# Patient Record
Sex: FEMALE | Race: WHITE | Employment: FULL TIME | URBAN - METROPOLITAN AREA
[De-identification: names, ages, dates, MRNs, and addresses within clinical notes are randomized per-mention and may not be internally consistent; named-entity substitution may affect disease eponyms.]

---

## 2017-01-07 ENCOUNTER — OFFICE VISIT (OUTPATIENT)
Dept: URGENT CARE | Facility: URGENT CARE | Age: 32
End: 2017-01-07
Payer: COMMERCIAL

## 2017-01-07 VITALS
HEART RATE: 85 BPM | BODY MASS INDEX: 34.37 KG/M2 | WEIGHT: 176 LBS | OXYGEN SATURATION: 97 % | DIASTOLIC BLOOD PRESSURE: 85 MMHG | SYSTOLIC BLOOD PRESSURE: 116 MMHG | TEMPERATURE: 96.8 F

## 2017-01-07 DIAGNOSIS — J45.901 REACTIVE AIRWAY DISEASE, UNSPECIFIED ASTHMA SEVERITY, WITH ACUTE EXACERBATION: ICD-10-CM

## 2017-01-07 DIAGNOSIS — H65.01 RIGHT ACUTE SEROUS OTITIS MEDIA, RECURRENCE NOT SPECIFIED: ICD-10-CM

## 2017-01-07 DIAGNOSIS — J20.9 ACUTE BRONCHITIS WITH COEXISTING CONDITION REQUIRING PROPHYLACTIC TREATMENT: Primary | ICD-10-CM

## 2017-01-07 PROCEDURE — 99214 OFFICE O/P EST MOD 30 MIN: CPT | Performed by: FAMILY MEDICINE

## 2017-01-07 RX ORDER — PREDNISONE 20 MG/1
20 TABLET ORAL DAILY
Qty: 5 TABLET | Refills: 0 | Status: SHIPPED | OUTPATIENT
Start: 2017-01-07 | End: 2017-02-10

## 2017-01-07 RX ORDER — AZITHROMYCIN 250 MG/1
TABLET, FILM COATED ORAL
Qty: 6 TABLET | Refills: 0 | Status: SHIPPED | OUTPATIENT
Start: 2017-01-07 | End: 2017-02-10

## 2017-01-07 NOTE — PROGRESS NOTES
SUBJECTIVE:                                                    Heron Gee is a 31 year old female who presents to clinic today for the following health issues:      RESPIRATORY SYMPTOMS      Duration: 5 days     Description  Cough, congestion, drainage in throat     Severity: moderate    Accompanying signs and symptoms: None    History (predisposing factors):  tobacco abuse    Precipitating or alleviating factors: worse at night    Therapies tried and outcome:  Dayquil, nyquil with no relief        Just started of with some nasal congestion  But the cough progressively worsening.   Known Asthma: per patient childhood asthma/reactive airway  Known COPD: No  SMOKER: Yes  Fever No  Sinus congestion/sinus pain yes  Chest pain or exertional shortness of breath: NO  Exposure to pertussis or pertussis like symptoms: NO  Orthopnea, worsening edema, pnd: NO  Rash: NO  Tried OTC medications without relief  Worsening symptoms hence patient came in to be seen     Problem list and histories reviewed & adjusted, as indicated.  Additional history: as documented    Problem list, Medication list, Allergies, and Medical/Social/Surgical histories reviewed in EPIC and updated as appropriate.    ROS:  Constitutional, HEENT, cardiovascular, pulmonary, gi and gu systems are negative, except as otherwise noted.    OBJECTIVE:                                                    /85 mmHg  Pulse 85  Temp(Src) 96.8  F (36  C) (Oral)  Wt 176 lb (79.833 kg)  SpO2 97%  Body mass index is 34.37 kg/(m^2).  GENERAL: healthy, alert and no distress  EYES: Pink palpebral conjunctiva, anicteric sclera  ENT: midline nasal septum normal ear exam of left. Right tympaninc membrane erythematous with dullness. Normal sinuses  NECK: no adenopathy, no asymmetry, masses, or scars and thyroid normal to palpation  RESP: symmetrical chest expansion, no retractions, increased expiratory phase wheezes heard at bilateral lungs both lung base.   CV:  regular rate and rhythm, normal S1 S2, no S3 or S4, no murmur, click or rub, no peripheral edema and peripheral pulses strong  SKIN: no readily visible rashes noted  MS: no gross musculoskeletal defects noted    Diagnostic Test Results:  none      ASSESSMENT/PLAN:                                                        ICD-10-CM    1. Acute bronchitis with coexisting condition requiring prophylactic treatment J20.9 azithromycin (ZITHROMAX) 250 MG tablet   2. Right acute serous otitis media, recurrence not specified H65.01 azithromycin (ZITHROMAX) 250 MG tablet   3. Reactive airway disease, unspecified asthma severity, with acute exacerbation J45.901 predniSONE (DELTASONE) 20 MG tablet       Patient given a prescription for zithromax. Side effects of antibiotic discussed. Aware of small but statistically significant increase cardiovascular risk with antibiotic.  Some wheezing noted. Cough worse at night prescribed with prednisone as well  Follow up with primary care provider recommended in 3 days  Advised that prolonged cough > 3 weeks recommend chest xray, offered today, declined.   Adverse reactions of medications discussed.  Over the counter medications discussed.   Aware to come back in if with worsening symptoms or if no relief despite treatment plan  Patient voiced understanding and had no further questions.     MD Winnie Frank MD  [facility]

## 2017-01-07 NOTE — MR AVS SNAPSHOT
After Visit Summary   1/7/2017    Heron Gee    MRN: [id number]           Patient Information     Date Of Birth          1985        Visit Information        Provider Department      1/7/2017 9:05 Winnie Shaffer MD [facility]        Today's Diagnoses     Acute bronchitis with coexisting condition requiring prophylactic treatment    -  1     Right acute serous otitis media, recurrence not specified         Reactive airway disease, unspecified asthma severity, with acute exacerbation            Follow-ups after your visit        Who to contact     If you have questions or need follow up information about today's clinic visit or your schedule please contact [facility] directly at [phone].  Normal or non-critical lab and imaging results will be communicated to you by MyChart, letter or phone within 4 business days after the clinic has received the results. If you do not hear from us within 7 days, please contact the clinic through [employer] or phone. If you have a critical or abnormal lab result, we will notify you by phone as soon as possible.  Submit refill requests through [employer] or call your pharmacy and they will forward the refill request to us. Please allow 3 business days for your refill to be completed.          Additional Information About Your Visit        MyChart Information     [employer] gives you secure access to your electronic health record. If you see a primary care provider, you can also send messages to your care team and make appointments. If you have questions, please call your primary care clinic.  If you do not have a primary care provider, please call [phone] and they will assist you.        Care EveryWhere ID     This is your Care EveryWhere ID. This could be used by other organizations to access your [facility] medical records  [id number]        Your Vitals Were     Pulse Temperature Pulse Oximetry             85 96.8  F (36   C) (Oral) 97%          Blood Pressure from Last 3 Encounters:   01/07/17 116/85   04/11/16 125/83   02/19/16 127/84    Weight from Last 3 Encounters:   01/07/17 176 lb (79.833 kg)   04/11/16 183 lb (83.008 kg)   02/19/16 179 lb 9.6 oz (81.466 kg)              Today, you had the following     No orders found for display         Today's Medication Changes          These changes are accurate as of: 1/7/17  9:28 AM.  If you have any questions, ask your nurse or doctor.               Start taking these medicines.        Dose/Directions    predniSONE 20 MG tablet   Commonly known as:  DELTASONE   Used for:  Reactive airway disease, unspecified asthma severity, with acute exacerbation   Started by:  Winnie Trevino MD        Dose:  20 mg   Take 1 tablet (20 mg) by mouth daily   Quantity:  5 tablet   Refills:  0         These medicines have changed or have updated prescriptions.        Dose/Directions    azithromycin 250 MG tablet   Commonly known as:  ZITHROMAX   This may have changed:  additional instructions   Used for:  Right acute serous otitis media, recurrence not specified, Acute bronchitis with coexisting condition requiring prophylactic treatment   Changed by:  Winnie Trevino MD        2 tablets the first day, then 1 tablet daily for the next 4 days   Quantity:  6 tablet   Refills:  0         Stop taking these medicines if you haven't already. Please contact your care team if you have questions.     albuterol 108 (90 BASE) MCG/ACT Inhaler   Commonly known as:  PROAIR HFA/PROVENTIL HFA/VENTOLIN HFA   Stopped by:  Winnie Trevino MD           guaiFENesin-codeine 100-10 MG/5ML Soln solution   Commonly known as:  ROBITUSSIN AC   Stopped by:  Winnie Trevino MD                Where to get your medicines      These medications were sent to [facility] [address] [address], [facility] [address]  [address] [address] [address]     Phone:  [phone]     - azithromycin 250 MG tablet  - predniSONE 20 MG tablet             Primary Care Provider Office Phone # Fax #    Mat Mawuena Agbeh, [phone]       [facility] [address] [facility] [facility] [address] [facility] [address]        Thank you!     [facility] you [facility]  [facility] your [facility]. Our goal is always to provide you with excellent care. Hearing back from our patients is one way we can continue to improve our services. Please take a few minutes to complete the written survey that you may receive in the mail after your visit with us. Thank you!             Your Updated Medication List - Protect others around you: Learn how to safely use, store and throw away your medicines at www.disposemymeds.org.          This list is accurate as of: 1/7/17  9:28 AM.  Always use your most recent med list.                   Brand Name Dispense Instructions for use    ALLEGRA-D 12 HOUR PO          azithromycin 250 MG tablet    ZITHROMAX    6 tablet    2 tablets the first day, then 1 tablet daily for the next 4 days       etonogestrel-ethinyl estradiol 0.12-0.015 MG/24HR vaginal ring    NUVARING    3 each    Place 1 ring every 21 days then remove for 1 week.       predniSONE 20 MG tablet    DELTASONE    5 tablet    Take 1 tablet (20 mg) by mouth daily

## 2017-01-07 NOTE — NURSING NOTE
Chief Complaint   Patient presents with     Sick       Initial /85 mmHg  Pulse 85  Temp(Src) 96.8  F (36  C) (Oral)  Wt 176 lb (79.833 kg)  SpO2 97% Estimated body mass index is 34.37 kg/(m^2) as calculated from the following:    Height as of 4/11/16: 5' (1.524 m).    Weight as of this encounter: 176 lb (79.833 kg).  BP completed using cuff size: NICOLE Brown

## 2017-01-16 DIAGNOSIS — Z30.09 ENCOUNTER FOR OTHER GENERAL COUNSELING OR ADVICE ON CONTRACEPTION: Primary | ICD-10-CM

## 2017-01-16 NOTE — TELEPHONE ENCOUNTER
NUVARING      Last Written Prescription Date: 10-22-16  Last Fill Quantity: 3, # refills: 3  Last Office Visit with G, P or [facility] prescribing provider: 2-19-16       BP Readings from Last 3 Encounters:   01/07/17 116/85   04/11/16 125/83   02/19/16 127/84     Date of last Breast Exam: ?

## 2017-01-19 RX ORDER — ETONOGESTREL AND ETHINYL ESTRADIOL VAGINAL RING .015; .12 MG/D; MG/D
RING VAGINAL
Qty: 3 EACH | Refills: 3 | Status: SHIPPED | OUTPATIENT
Start: 2017-01-19 | End: 2017-02-10

## 2017-01-19 NOTE — TELEPHONE ENCOUNTER
Return call from patient- patient scheduled for AFE on 2/10/17 at [facility] with Dr. Agbeh.  Patient requesting extension refill to get her through to this appointment.  No call back required.

## 2017-01-19 NOTE — TELEPHONE ENCOUNTER
Patient last seen by Dr. Agbeh on 1/28/16 for PPE. Patient due to be seen 1/28/17 or later.  Left voicemail for patient to call.

## 2017-02-10 ENCOUNTER — OFFICE VISIT (OUTPATIENT)
Dept: OBGYN | Facility: CLINIC | Age: 32
End: 2017-02-10
Payer: COMMERCIAL

## 2017-02-10 VITALS
DIASTOLIC BLOOD PRESSURE: 73 MMHG | RESPIRATION RATE: 16 BRPM | OXYGEN SATURATION: 98 % | HEART RATE: 62 BPM | BODY MASS INDEX: 34.75 KG/M2 | WEIGHT: 177 LBS | SYSTOLIC BLOOD PRESSURE: 111 MMHG | HEIGHT: 60 IN

## 2017-02-10 DIAGNOSIS — Z30.09 ENCOUNTER FOR OTHER GENERAL COUNSELING OR ADVICE ON CONTRACEPTION: ICD-10-CM

## 2017-02-10 DIAGNOSIS — Z01.419 ENCOUNTER FOR GYNECOLOGICAL EXAMINATION WITHOUT ABNORMAL FINDING: Primary | ICD-10-CM

## 2017-02-10 DIAGNOSIS — Z83.3 FAMILY HISTORY OF DIABETES MELLITUS: ICD-10-CM

## 2017-02-10 LAB
ALBUMIN SERPL-MCNC: 3.6 G/DL (ref 3.4–5)
ALP SERPL-CCNC: 74 U/L (ref 40–150)
ALT SERPL W P-5'-P-CCNC: 38 U/L (ref 0–50)
ANION GAP SERPL CALCULATED.3IONS-SCNC: 7 MMOL/L (ref 3–14)
AST SERPL W P-5'-P-CCNC: 20 U/L (ref 0–45)
BILIRUB SERPL-MCNC: 0.2 MG/DL (ref 0.2–1.3)
BUN SERPL-MCNC: 13 MG/DL (ref 7–30)
CALCIUM SERPL-MCNC: 9.1 MG/DL (ref 8.5–10.1)
CHLORIDE SERPL-SCNC: 107 MMOL/L (ref 94–109)
CO2 SERPL-SCNC: 27 MMOL/L (ref 20–32)
CREAT SERPL-MCNC: 0.84 MG/DL (ref 0.52–1.04)
ERYTHROCYTE [DISTWIDTH] IN BLOOD BY AUTOMATED COUNT: 13.3 % (ref 10–15)
GFR SERPL CREATININE-BSD FRML MDRD: 80 ML/MIN/1.7M2
GLUCOSE SERPL-MCNC: 87 MG/DL (ref 70–99)
HBA1C MFR BLD: 5.1 % (ref 4.3–6)
HCT VFR BLD AUTO: 41.9 % (ref 35–47)
HGB BLD-MCNC: 14 G/DL (ref 11.7–15.7)
MCH RBC QN AUTO: 28.9 PG (ref 26.5–33)
MCHC RBC AUTO-ENTMCNC: 33.4 G/DL (ref 31.5–36.5)
MCV RBC AUTO: 87 FL (ref 78–100)
PLATELET # BLD AUTO: 191 10E9/L (ref 150–450)
POTASSIUM SERPL-SCNC: 4.1 MMOL/L (ref 3.4–5.3)
PROT SERPL-MCNC: 7.5 G/DL (ref 6.8–8.8)
RBC # BLD AUTO: 4.84 10E12/L (ref 3.8–5.2)
SODIUM SERPL-SCNC: 141 MMOL/L (ref 133–144)
TSH SERPL DL<=0.005 MIU/L-ACNC: 1.87 MU/L (ref 0.4–4)
WBC # BLD AUTO: 10.5 10E9/L (ref 4–11)

## 2017-02-10 PROCEDURE — 84443 ASSAY THYROID STIM HORMONE: CPT | Performed by: OBSTETRICS & GYNECOLOGY

## 2017-02-10 PROCEDURE — 80053 COMPREHEN METABOLIC PANEL: CPT | Performed by: OBSTETRICS & GYNECOLOGY

## 2017-02-10 PROCEDURE — 36415 COLL VENOUS BLD VENIPUNCTURE: CPT | Performed by: OBSTETRICS & GYNECOLOGY

## 2017-02-10 PROCEDURE — 99395 PREV VISIT EST AGE 18-39: CPT | Performed by: OBSTETRICS & GYNECOLOGY

## 2017-02-10 PROCEDURE — 83036 HEMOGLOBIN GLYCOSYLATED A1C: CPT | Performed by: OBSTETRICS & GYNECOLOGY

## 2017-02-10 PROCEDURE — 85027 COMPLETE CBC AUTOMATED: CPT | Performed by: OBSTETRICS & GYNECOLOGY

## 2017-02-10 RX ORDER — ETONOGESTREL AND ETHINYL ESTRADIOL VAGINAL RING .015; .12 MG/D; MG/D
RING VAGINAL
Qty: 3 EACH | Refills: 3 | Status: SHIPPED | OUTPATIENT
Start: 2017-02-10 | End: 2018-07-09

## 2017-02-10 NOTE — PATIENT INSTRUCTIONS
If you have any questions regarding your visit, Please contact your care team.      Women s Health CLINIC HOURS TELEPHONE NUMBER   Cephas Agbeh, M.D. Lisa -      Viri     Monday-[place]  8:00a.m-4:45 p.m  Tuesday--[place]   8:00a.m-4:45 p.m.  Thursday-[place]  8:00a.m-4:45 p.m.  Friday-[facility]  [address] [place] [address]  [phone]    [id number]-[phone]    [facility]  [address] [facility] [address]  [phone]  [phone]   Urgent Care locations:    [facility] Monday-Friday  5 pm - 9 pm  Saturday and Sunday   9 am - 5 pm    Monday-Friday   11 pm - 9 pm  Saturday and Sunday   9 am - 5 pm   [phone]     If you need a medication refill, please contact your pharmacy. Please allow 3 business days for your refill to be completed.  As always, Thank you for trusting us with your healthcare needs!

## 2017-02-10 NOTE — NURSING NOTE
Chief Complaint   Patient presents with     Physical     AFE       Initial /73 mmHg  Pulse 62  Resp 16  Ht 5' (1.524 m)  Wt 177 lb (80.287 kg)  BMI 34.57 kg/m2  SpO2 98%  LMP 01/26/2017  Breastfeeding? No Estimated body mass index is 34.57 kg/(m^2) as calculated from the following:    Height as of this encounter: 5' (1.524 m).    Weight as of this encounter: 177 lb (80.287 kg).  Medication Reconciliation: complete   Salinai Vernon, CMA

## 2017-02-10 NOTE — PROGRESS NOTES
Heron is a 31 year old  here for annual exam. Complaints of fatigue and decrease libido. Baby is 14 months.  Menses are regular with the ring.    ROS: Ten point review of systems was reviewed and negative except the above.    Health Maintenance   Topic Date Due     FOOT EXAM Q1 YEAR( NO INBASKET)  1988     EYE EXAM Q1 YEAR( NO INBASKET)  1988     CREATININE Q1 YEAR (NO INBASKET)  1988     TSH W/ FREE T4 REFLEX Q2 YEAR (NO INBASKET)  1988     LIPID MONITORING Q1 YEAR( NO INBASKET)  1988     MICROALBUMIN Q1 YEAR( NO INBASKET)  1988     PNEUMOVAX 1X HI RISK PATIENT < 65 (NO IB MSG)  1989     A1C Q6 MO( NO INBASKET)  10/17/2015     INFLUENZA VACCINE (SYSTEM ASSIGNED)  2017     PAP Q3 YR  2019     TETANUS IMMUNIZATION (SYSTEM ASSIGNED)  10/02/2025      Last pap:   Last Mammogram: none  Last Dexa: none  Last Colonoscopy:   No results found for this basename: chol  No results found for this basename: hdl  No results found for this basename: ldl  No results found for this basename: trig  No results found for this basename: cholhdlratio      OBHX:      Past Surgical History   Procedure Laterality Date     Appendectomy       as a child     Deviated septum       teenager       PMH: Her past medical, surgical, and obstetric histories were reviewed and are documented in their appropriate chart areas.    ALL/Meds: Her medication and allergy histories were reviewed and are documented in their appropriate chart areas.    SH/FMH: Her social and family history was reviewed and documented in its appropriate chart area.    PE: /73 mmHg  Pulse 62  Resp 16  Ht 5' (1.524 m)  Wt 177 lb (80.287 kg)  BMI 34.57 kg/m2  SpO2 98%  LMP 2017  Breastfeeding? No  Body mass index is 34.57 kg/(m^2).    General Appearance:  healthy, alert, active, no distress  Cardiovascular:  Regular rate and Rhythm  Neck: Supple, no adenopathy and thyroid normal  Lungs:  Clear, without  wheeze, rale or rhonchi  Breast: fibrocystic changes Left breast 12 oclock., recheck in one month.  Abdomen: Benign, Soft, flat, non-tender, No masses, organomegaly, No inguinal nodes and Bowel sounds normoactiveSoft, nontender.   Pelvic:       - Ext: Vulva and perineum are normal without lesion, mass or discharge        - Urethra: normal without discharge or scarring  hypermobility       - Urethral Meatus: normal appearance,        - Bladder: no tenderness, no masses       - Vagina: Normal mucosa, no discharge     rugated       - Cervix: normal       - Uterus:Normal shape, position and consistencyfirm, nontender, nongravid uterus without CMT       - Adnexa: Normal without masses or tenderness       - Rectal: deferred    A/P:  [facility] Woman,     ICD-10-CM    1. Encounter for gynecological examination without abnormal finding Z01.419 CBC with platelets     Comprehensive metabolic panel     TSH with free T4 reflex     Hemoglobin A1c   2. Encounter for other general counseling or advice on contraception Z30.09 etonogestrel-ethinyl estradiol (NUVARING) 0.12-0.015 MG/24HR vaginal ring     CBC with platelets     Comprehensive metabolic panel     TSH with free T4 reflex     Hemoglobin A1c   3. Routine postpartum follow-up Z39.2 etonogestrel-ethinyl estradiol (NUVARING) 0.12-0.015 MG/24HR vaginal ring   4. Family history of diabetes mellitus Z83.3         -  BC: RING.      - Encouraged self-breast exam. Recheck breast exam in one month.   - Encouraged low fat diet, regular exercise, and adequate calcium intake.    CEPHAS AGBEH, MD.

## 2017-02-11 ASSESSMENT — PATIENT HEALTH QUESTIONNAIRE - PHQ9: SUM OF ALL RESPONSES TO PHQ QUESTIONS 1-9: 3

## 2017-03-17 ENCOUNTER — OFFICE VISIT (OUTPATIENT)
Dept: OBGYN | Facility: CLINIC | Age: 32
End: 2017-03-17
Payer: COMMERCIAL

## 2017-03-17 VITALS
TEMPERATURE: 97.1 F | BODY MASS INDEX: 33.98 KG/M2 | HEART RATE: 120 BPM | DIASTOLIC BLOOD PRESSURE: 73 MMHG | WEIGHT: 174 LBS | SYSTOLIC BLOOD PRESSURE: 112 MMHG | OXYGEN SATURATION: 95 %

## 2017-03-17 DIAGNOSIS — N60.11 FIBROCYSTIC BREAST CHANGES OF BOTH BREASTS: Primary | ICD-10-CM

## 2017-03-17 DIAGNOSIS — N60.12 FIBROCYSTIC BREAST CHANGES OF BOTH BREASTS: Primary | ICD-10-CM

## 2017-03-17 PROCEDURE — 99214 OFFICE O/P EST MOD 30 MIN: CPT | Performed by: OBSTETRICS & GYNECOLOGY

## 2017-03-17 NOTE — NURSING NOTE
Chief Complaint   Patient presents with     Gyn Exam     recheck left breast       Initial /73  Pulse 120  Temp 97.1  F (36.2  C) (Tympanic)  Wt 174 lb (78.9 kg)  LMP 02/22/2017 (Approximate)  SpO2 95%  BMI 33.98 kg/m2 Estimated body mass index is 33.98 kg/(m^2) as calculated from the following:    Height as of 2/10/17: 5' (1.524 m).    Weight as of this encounter: 174 lb (78.9 kg).  Medication Reconciliation: complete     Makayla Naik LPN

## 2017-03-17 NOTE — MR AVS SNAPSHOT
After Visit Summary   3/17/2017    Heron Gee    MRN: [id number]           Patient Information     Date Of Birth          1985        Visit Information        Provider Department      3/17/2017 8:30 AM Agbeh, Cephas Mawuena, MD [facility]        Today's Diagnoses     Fibrocystic breast changes of both breasts    -  1       Follow-ups after your visit        Who to contact     If you have questions or need follow up information about today's clinic visit or your schedule please contact [facility] directly at [phone].  Normal or non-critical lab and imaging results will be communicated to you by [employer], letter or phone within 4 business days after the clinic has received the results. If you do not hear from us within 7 days, please contact the clinic through [employer] or phone. If you have a critical or abnormal lab result, we will notify you by phone as soon as possible.  Submit refill requests through [employer] or call your pharmacy and they will forward the refill request to us. Please allow 3 business days for your refill to be completed.          Additional Information About Your Visit        MyChart Information     [employer] gives you secure access to your electronic health record. If you see a primary care provider, you can also send messages to your care team and make appointments. If you have questions, please call your primary care clinic.  If you do not have a primary care provider, please call [phone] and they will assist you.        Care EveryWhere ID     This is your Care EveryWhere ID. This could be used by other organizations to access your [facility] medical records  [id number]        Your Vitals Were     Pulse Temperature Last Period Pulse Oximetry BMI (Body Mass Index)       120 97.1  F (36.2  C) (Tympanic) 02/22/2017 (Approximate) 95% 33.98 kg/m2        Blood Pressure from Last 3 Encounters:   03/17/17 112/73   02/10/17 111/73   01/07/17 116/85     Weight from Last 3 Encounters:   03/17/17 174 lb (78.9 kg)   02/10/17 177 lb (80.3 kg)   01/07/17 176 lb (79.8 kg)              Today, you had the following     No orders found for display       Primary Care Provider Office Phone # Fax #    Mat Mawuena Agbeh, [phone]       [facility] [address] [facility] [address]        Thank you!     Thank you for choosing [facility]  for your care. Our goal is always to provide you with excellent care. Hearing back from our patients is one way we can continue to improve our services. Please take a few minutes to complete the written survey that you may receive in the mail after your visit with us. Thank you!             Your Updated Medication List - Protect others around you: Learn how to safely use, store and throw away your medicines at www.disposemymeds.org.          This list is accurate as of: 3/17/17  9:14 AM.  Always use your most recent med list.                   Brand Name Dispense Instructions for use    ALLEGRA-D 12 HOUR PO          etonogestrel-ethinyl estradiol 0.12-0.015 MG/24HR vaginal ring    NUVARING    3 each    Place 1 ring every 21 days then remove for 1 week.

## 2017-03-17 NOTE — PROGRESS NOTES
Heron Gee is a 31 year old year old female, G 1 P1, who presents today for her one month recheck of breast changes.  She reports denies any pain, nipple discharge or discolation       Past Medical History   Diagnosis Date     ASCUS on Pap smear 4/17/15     Plan Pap/HPV at 6 wk PP exam       Past Surgical History   Procedure Laterality Date     Appendectomy       as a child     Deviated septum       teenager       Obstetric History       T0      TAB0   SAB0   E0   M0   L1       # Outcome Date GA Lbr Master/2nd Weight Sex Delivery Anes PTL Lv   1 Para 12/06/15 40w0d  7 lb 12 oz (3.515 kg) F CS-LTranv  N Y      Name: Alexander          No Known Allergies    Current Outpatient Prescriptions   Medication Sig Dispense Refill     etonogestrel-ethinyl estradiol (NUVARING) 0.12-0.015 MG/24HR vaginal ring Place 1 ring every 21 days then remove for 1 week. 3 each 3     Fexofenadine-Pseudoephedrine (ALLEGRA-D 12 HOUR PO)          Social History     Social History     Marital status: Single     Spouse name: N/A     Number of children: N/A     Years of education: N/A     Occupational History     Not on file.     Social History Main Topics     Smoking status: Current Every Day Smoker     Packs/day: 0.50     Smokeless tobacco: Never Used     Alcohol use 0.0 oz/week     0 Standard drinks or equivalent per week     Drug use: No     Sexual activity: Yes     Partners: Male     Other Topics Concern     Not on file     Social History Narrative       Family History   Problem Relation Age of Onset     DIABETES Brother      Hypertension No family hx of      Breast Cancer No family hx of      Ovarian Cancer No family hx of      CEREBROVASCULAR DISEASE No family hx of      Thyroid Disease No family hx of           Review of Symptoms:    10 point ROS of systems including Constitutional, Eyes, Respiratory, Cardiovascular, Gastroenterology, Genitourinary, Integumentary, Muscularskeletal, Psychiatric were all negative except for  pertinent positives noted in my HPI and in the PMH.           EXAM:  /73  Pulse 120  Temp 97.1  F (36.2  C) (Tympanic)  Wt 174 lb (78.9 kg)  LMP 02/22/2017 (Approximate)  SpO2 95%  BMI 33.98 kg/m2   General Appearance: Pleasant, alert, appropriate appearance for age. No acute distress  Head Exam: Normal. Normocephalic, atraumatic.  Neck Exam: neck supple with no rigidity  Thyroid Exam: No nodules or enlargement., normal to palpation  Chest/Respiratory Exam: Normal chest wall and respirations. Clear to auscultation.  Breast Exam: No dimpling, nipple retraction or discharge. No masses or nodes, normal breast tissue bilaterally. Fibrocystic changes.  Cardiovascular Exam: Regular rate and rhythm. S1, S2, no murmur  Gastrointestinal Exam: Soft, nontender, no abnormal masses or organomegaly.  Musculoskeletal Exam: Back is straight and non-tender, full ROM of upper and lower extremities.  Skin: no rash or abnormalities  Neurologic Exam: Normal gross motor movement, tone, and coordination. No tremor.  Psychiatric Exam: Alert and oriented, appropriate affect.      ASSESSMENT/PLAN:      ICD-10-CM    1. Fibrocystic breast changes of both breasts N60.11     N60.12      No further follow up except annual exams.  Discussed breast care and self exams.  30 minutes was spent face to face with the patient today discussing her history, diagnosis, and follow-up plan as noted above.  Over 50% of the visit was spent in counseling and coordination of care.    Total Visit Time: 25 minutes.    CEPHAS AGBEH, MD.

## 2018-02-20 NOTE — TELEPHONE ENCOUNTER
"Pt has no dx for maintenance inhaler  Call to home number \"mail box is full  Wondering if pt is having symptoms ,will try again later    "

## 2018-02-20 NOTE — TELEPHONE ENCOUNTER
"Requested Prescriptions   Pending Prescriptions Disp Refills     VENTOLIN  (90 BASE) MCG/ACT Inhaler [Pharmacy Med Name: VENTOLIN HFA 90 MCG INHALER] 18 Inhaler 1    Last Written Prescription Date:  2-20-18  Last Fill Quantity: 18,  # refills: 1   Last office visit: 4/11/2016 with prescribing provider:  4-11-16   Future Office Visit:   Sig: INHALE TWO PUFFS BY MOUTH EVERY 6 HOURS AS NEEDED SHORTNESS OF BREATH/DYSPNEA OR WHEEZING    Asthma Maintenance Inhalers - Anticholinergics Passed    2/20/2018 11:13 AM       Passed - Patient is age 12 years or older       Passed - Recent or future visit with authorizing provider's specialty    Patient had office visit in the last year or has a visit in the next 30 days with authorizing provider.  See \"Patient Info\" tab in inbasket, or \"Choose Columns\" in Meds & Orders section of the refill encounter.             "

## 2018-02-23 RX ORDER — ALBUTEROL SULFATE 90 UG/1
AEROSOL, METERED RESPIRATORY (INHALATION)
Qty: 18 INHALER | Refills: 1 | OUTPATIENT
Start: 2018-02-23

## 2018-02-23 NOTE — TELEPHONE ENCOUNTER
Attempted to reach patient.  Unable to leave message due to mail box is full.  Med prescribed almost 2 years ago for an acute illness.  Called pharmacy and informed them to have patient call clinic.

## 2018-03-12 DIAGNOSIS — Z30.09 ENCOUNTER FOR OTHER GENERAL COUNSELING OR ADVICE ON CONTRACEPTION: ICD-10-CM

## 2018-03-12 NOTE — TELEPHONE ENCOUNTER
Requested Prescriptions   Pending Prescriptions Disp Refills     etonogestrel-ethinyl estradiol (NUVARING) 0.12-0.015 MG/24HR vaginal ring  Last Written Prescription Date:  12/19/17  Last Fill Quantity: 3,  # refills: 3   Last office visit: 3/17/2017 with prescribing provider:  C. Agbeh   Future Office Visit:     3 each 3     Sig: Place 1 ring every 21 days then remove for 1 week.    There is no refill protocol information for this order

## 2018-03-19 NOTE — TELEPHONE ENCOUNTER
Tried to attempt patient but unable to leave message. Also, spoke with pharmacy and let them know patient needs to be seen before more refills. They will make a note.    Heidi Daniels RN

## 2018-03-22 RX ORDER — ETONOGESTREL AND ETHINYL ESTRADIOL VAGINAL RING .015; .12 MG/D; MG/D
RING VAGINAL
Qty: 3 EACH | Refills: 3 | OUTPATIENT
Start: 2018-03-22

## 2018-03-22 NOTE — TELEPHONE ENCOUNTER
Tried to attempt patient but unable to leave a message. Pharmacy was aware if patient calls needs to be seen.  Heidi Daniels RN

## 2018-04-18 ENCOUNTER — VIRTUAL VISIT (OUTPATIENT)
Dept: FAMILY MEDICINE | Facility: OTHER | Age: 33
End: 2018-04-18

## 2018-04-18 NOTE — PROGRESS NOTES
"Date:   Clinician: Heidi Santiago  Clinician NPI: [id number]  Patient: Heron Gee  Patient : 1985  Patient Address: [address] [place], [place] [address]  Patient Phone: [phone]  Visit Protocol: URI  Patient Summary:  Heron is a 33 year old ( : 1985 ) female who initiated a Visit for cold, sinus infection, or influenza. When asked the question \"Please sign me up to receive news, health information and promotions. \", Heron responded \"No\".    Heron states her symptoms started gradually 7-9 days ago. After her symptoms started, they improved and then got worse again.   Her symptoms consist of facial pain or pressure, rhinitis, enlarged lymph nodes, nasal congestion, malaise, and myalgia.   Symptom details     Nasal secretions: The color of her mucus is blood-tinged and yellow.    Facial pain or pressure: The facial pain or pressure does not feel worse when bending or leaning forward.      Heron denies having cough, fever, dyspnea, ear pain, sore throat, headache, chills, wheezing, and teeth pain. She also denies having recent facial or sinus surgery in the past 60 days, taking antibiotic medication for the symptoms, and having a sinus infection within the past year.   She has not recently been exposed to someone with influenza. Heron has been in close contact with the following high risk individuals: children under the age of 5.   Weight: 180 lbs   Heron does not smoke or use smokeless tobacco.   She denies pregnancy and denies breastfeeding. She has menstruated in the past month.  MEDICATIONS:  No current medications  , ALLERGIES:   NKDA    Clinician Response:  Dear Heron,  Based on the information provided, you have acute bacterial sinusitis, also known as a sinus infection. Sinus infections are caused by bacteria or a virus and symptoms are almost always identical. The difference between the 2 types of infections is timing.  Sinus infections start as viral " infections and symptoms improve on their own in about 7 days. If symptoms have not improved after 7 days or have even worsened, a bacterial infection may have developed.  Medication information  I am prescribing:       Fluticasone 50 mcg/actuation nasal spray. Inhale 1-2 sprays in each nostril 1 time per day. This medication takes several days to start working, so keep taking it even if it doesn't help right away. There are no refills with this prescription.      Amoxicillin-pot clavulanate 875-125 mg oral tablet. Take 1 tablet by mouth every 12 hours for 10 days. There are no refills with this prescription.     Antibiotics can cause an allergic reaction even if you have taken them without a problem in the past. If you develop a new rash, swelling, or difficulty breathing, stop the medication and be seen in a clinic or urgent care immediately.  A yeast infection is a side effect of taking antibiotics in some women. Please use [facility] to get treatment if you have symptoms of a yeast infection.  If you become pregnant during this course of treatment, stop taking the medication and contact your primary care provider.  Unless you are allergic to the over-the-counter medication(s) below, I recommend using:       Acetaminophen (Tylenol or store brand) oral tablet. Take 1-2 tablets by mouth every 4-6 hours to help with the discomfort.      Ibuprofen (Advil or store brand) 200 mg oral tablet. Take 1-3 tablets (200-600 mg) by mouth every 8 hours to help with the discomfort. Make sure to take the ibuprofen with food. Do not exceed 2400 mg in 24 hours.    A decongestant such as Sudafed PE or store brand.      Guaifenesin + dextromethorphan (Robitussin DM, Mucinex DM, or store brand).    A sinus irrigation kit such as Sinus Rinse, Neti Pot, SinuCleanse, or store brand. Be sure to use sterile or previously boiled water to prevent unwanted infections.     Over-the-counter medications do not require a prescription. Ask the  pharmacist if you have any questions.  Self care  The following tips will keep you as comfortable as possible while you recover:     Rest    Drink plenty of water and other liquids    Take a hot shower to loosen congestion     When to seek care  Please be seen in a clinic or urgent care if any of the following occur:     Symptoms do not start to improve after 3 days of treatment    New symptoms develop, or symptoms become worse      Diagnosis: Acute bacterial sinusitis  Diagnosis ICD: J01.90  Prescription: fluticasone 50 mcg/actuation nasal spray,suspension 1 120 spray aerosol with adapter (grams), 30 days supply. Inhale 1-2 sprays in each nostril 1 time per day. Refills: 0, Refill as needed: no, Allow substitutions: yes  Prescription: amoxicillin-pot clavulanate 875-125 mg oral tablet 20 tablet, 10 days supply. Take 1 tablet by mouth every 12 hours for 10 days. Refills: 0, Refill as needed: no, Allow substitutions: yes  Pharmacy: [id number] - [phone] - [address] [place] [address]

## 2018-07-09 ENCOUNTER — OFFICE VISIT (OUTPATIENT)
Dept: FAMILY MEDICINE | Facility: CLINIC | Age: 33
End: 2018-07-09
Payer: COMMERCIAL

## 2018-07-09 VITALS
HEART RATE: 122 BPM | OXYGEN SATURATION: 97 % | SYSTOLIC BLOOD PRESSURE: 113 MMHG | BODY MASS INDEX: 38.3 KG/M2 | WEIGHT: 190 LBS | DIASTOLIC BLOOD PRESSURE: 75 MMHG | TEMPERATURE: 99.1 F | RESPIRATION RATE: 16 BRPM | HEIGHT: 59 IN

## 2018-07-09 DIAGNOSIS — Z32.01 POSITIVE PREGNANCY TEST: Primary | ICD-10-CM

## 2018-07-09 DIAGNOSIS — O99.331 MATERNAL TOBACCO USE IN FIRST TRIMESTER: ICD-10-CM

## 2018-07-09 LAB — BETA HCG QUAL IFA URINE: POSITIVE

## 2018-07-09 PROCEDURE — 99213 OFFICE O/P EST LOW 20 MIN: CPT | Performed by: NURSE PRACTITIONER

## 2018-07-09 PROCEDURE — 84703 CHORIONIC GONADOTROPIN ASSAY: CPT | Performed by: NURSE PRACTITIONER

## 2018-07-09 NOTE — NURSING NOTE
"Chief Complaint   Patient presents with     Pregnancy Test       Initial /75  Pulse 122  Temp 99.1  F (37.3  C) (Oral)  Resp 16  Ht 4' 11.45\" (1.51 m)  Wt 190 lb (86.2 kg)  LMP 05/12/2018 (Approximate)  SpO2 97%  Breastfeeding? No  BMI 37.8 kg/m2 Estimated body mass index is 37.8 kg/(m^2) as calculated from the following:    Height as of this encounter: 4' 11.45\" (1.51 m).    Weight as of this encounter: 190 lb (86.2 kg).  Medication Reconciliation: complete     Pati Bloom MA  "

## 2018-07-09 NOTE — MR AVS SNAPSHOT
After Visit Summary   7/9/2018    Heron Gee    MRN: [id number]           Patient Information     Date Of Birth          1985        Visit Information        Provider Department      7/9/2018 3:20 PM Dana Borja NP [facility]        Today's Diagnoses     Positive pregnancy test    -  1    Maternal tobacco use in first trimester          Care Instructions    Take daily prenatal vitamin with food. Try to cut back on your smoking.  No alcohol is safe in pregnancy.  [facility] has the drug site to see that is safe during pregnancy-https://www.cdc.gov/pregnancy/meds/treatingfortwo/index.html  https://www.aafp.org/afp/2003/0615/p2517.html    Saturday, february 16, 2019   Only 31 weeks and 5 days left until your baby is born!   Conception likely took place on may 26, 2018.   You are 8 weeks and 2 days pregnant. (Fetal age: 6 weeks and 2 days)   The baby will be born during winter time.   You are in the first trimester.   The first trimester began on may 12, 2018.   The second trimester (weeks 13-27) begins on august 11, 2018.   The third trimester (weeks 28-40) begins on november 24, 2018.   Your safe range to give birth: january 26, 2019 to march 02, 2019 (full term).  February 2019   Sun Mon Tue Wed Thu Fri Sat        1 2   3 4 5 6 7 8 9   10 11 12 13 14 15 16   17 18 19 20 21 22 23   24 25 26 27 28     Add event to your [place] Calendar  58 days have passed since the start of your pregnancy. There are 222 days left until your due date.  21% of your pregnancy is completed.    Pregnancy    Your exam today shows that you are pregnant.  Pregnancy symptoms  During pregnancy your body s hormones change. This causes physical and emotional changes. This is normal. Knowing what to expect is important for your piece of mind and so you know when to seek help for a problem. Here are some of the most common symptoms:    Morning sickness or nausea. This can happen any time of the day or  night.    Tender, swollen breasts    Need to urinate frequently    Tiredness or fatigue    Dizziness    Indigestion or heartburn    Food cravings or turn-offs    Constipation    Emotional changes. This can range from anxiety to excitement to depression.  General care for a healthy pregnancy  Here are things you can do to help make sure your baby is born healthy:    Rest when you feel tired. This is especially true in the later months of pregnancy.    Drink more fluids. Your body needs more fluids than you may be used to. Drink 8 to10 glasses of juice, milk, or water every day.    Eat well-balanced meals. Eat at regular times to give your body enough protein. You can expect to gain about 30 pounds during the pregnancy. Don t try to diet or lose weight while you are pregnant.    Take a prenatal vitamin every day. This helps you meet the extra nutritional needs of pregnancy.    Don t take any other medicine during your pregnancy unless your healthcare provider tells you to. This includes prescription medicines and those you buy over the counter. Many medicines can harm the growing baby.    If you have nausea or vomiting, don t eat greasy or fried foods. Eat several smaller meals throughout the day rather than 3 large meals.    If you smoke, you must stop. The nicotine you breathe in goes right to the baby.    Stay away from alcohol, even in moderate amounts. Daily drinking will harm your baby and can cause permanent brain damage.    Don t use recreational drugs, especially cocaine, crack, and heroin. These will harm your baby. Also avoid marijuana.    If you were using recreational drugs or prescribed medicine when you found out that you were pregnant, talk with your healthcare provider about possible effects on your growing baby.    If you have medical problems that you need to take medicine for, talk with your healthcare provider.  Follow-up care  Call your healthcare provider to arrange for prenatal care. Prenatal  care is important. You can see your family provider, a pregnancy specialist (obstetrician), or a primary care clinic.  When to seek medical advice  Call your healthcare provider right away if any of these occur:    Vaginal bleeding    Pain in your belly (abdomen) or back that is moderate or severe    Lots of vomiting, or you can t keep any fluids down for 6 hours    Burning feeling when you urinate    Headache, dizziness, or rapid weight gain    Fever    Vision changes or blurred vision  Date Last Reviewed: 10/1/2016    3694-3135 [employer]. [address] [place] [address]. All rights reserved. This information is not intended as a substitute for professional medical care. Always follow your healthcare professional's instructions.                Follow-ups after your visit        Additional Services     OB/GYN REFERRAL       Your provider has referred you to:  FMG: [facility] [phone]   [email or web address]  [facility]: [facility] [phone]   [email or web address]  FM[facility]: [facility] [phone]   [email or web address]  FMG: [facility] [phone]   [email or web address]    Please be aware that coverage of these services is subject to the terms and limitations of your health insurance plan.  Call member services at your health plan with any benefit or coverage questions.      Please bring the following with you to your appointment:    (1) Any X-Rays, CTs or MRIs which have been performed.  Contact the facility where they were done to arrange for  prior to your scheduled appointment.   (2) List of current medications   (3) This referral request   (4) Any documents/labs given to you for this referral                  Follow-up notes from your care team     Return if symptoms worsen or fail to improve.      Your next  "10 appointments already scheduled     Jul 09, 2018  3:20 PM CDT   SHORT with Dana Borja NP   [facility]    [address] [facility]  [place] [address] [phone]              Who to contact     Normal or non-critical lab and imaging results will be communicated to you by Clickpasshart, letter or phone within 4 business days after the clinic has received the results. If you do not hear from us within 7 days, please contact the clinic through Clickpasshart or phone. If you have a critical or abnormal lab result, we will notify you by phone as soon as possible.  Submit refill requests through Dada or call your pharmacy and they will forward the refill request to us. Please allow 3 business days for your refill to be completed.          If you need to speak with a  for additional information , please call: [phone]             Additional Information About Your Visit        Dada Information     Dada gives you secure access to your electronic health record. If you see a primary care provider, you can also send messages to your care team and make appointments. If you have questions, please call your primary care clinic.  If you do not have a primary care provider, please call [phone] and they will assist you.        Care EveryWhere ID     This is your Care EveryWhere ID. This could be used by other organizations to access your [place] medical records  [id number]        Your Vitals Were     Pulse Temperature Respirations Height Last Period Pulse Oximetry    122 99.1  F (37.3  C) (Oral) 16 4' 11.45\" (1.51 m) 05/12/2018 (Approximate) 97%    Breastfeeding? BMI (Body Mass Index)                No 37.8 kg/m2           Blood Pressure from Last 3 Encounters:   07/09/18 113/75   03/17/17 112/73   02/10/17 111/73    Weight from Last 3 Encounters:   07/09/18 190 lb (86.2 kg)   03/17/17 174 lb (78.9 kg)   02/10/17 177 lb (80.3 kg)              We Performed " the Following     Beta HCG qual IFA urine     OB/GYN REFERRAL     TOBACCO CESSATION - FOR HEALTH MAINTENANCE        Primary Care Provider Office Phone # Fax #    Mat Mawuena Agbeh, [phone] [address] [place] [address]        Equal Access to Services     [facility]: Hadii aad ku hadasho Soomaali, waaxda luqadaha, qaybta kaalmada adeegyada, waxay idiin hayaan adeeg kharash la'aan . So [facility] [phone].    ATENCIÓN: Si habla español, tiene a lucas disposición servicios gratuitos de asistencia lingüística. Llame al [phone].    We comply with applicable federal civil rights laws and [place] laws. We do not discriminate on the basis of race, color, national origin, age, disability, sex, sexual orientation, or gender identity.            Thank you!     Thank you for choosing [facility]  for your care. Our goal is always to provide you with excellent care. Hearing back from our patients is one way we can continue to improve our services. Please take a few minutes to complete the written survey that you may receive in the mail after your visit with us. Thank you!             Your Updated Medication List - Protect others around you: Learn how to safely use, store and throw away your medicines at www.disposemymeds.org.          This list is accurate as of 7/9/18  3:14 PM.  Always use your most recent med list.                   Brand Name Dispense Instructions for use Diagnosis    ALLEGRA-D 12 HOUR PO       Cough

## 2018-07-09 NOTE — PATIENT INSTRUCTIONS
Take daily prenatal vitamin with food. Try to cut back on your smoking.  No alcohol is safe in pregnancy.  CDC has the drug site to see that is safe during pregnancy-https://www.cdc.gov/pregnancy/meds/treatingfortwo/index.html  https://www.aafp.org/afp/2003/0615/p2517.html    Saturday, february 16, 2019   Only 31 weeks and 5 days left until your baby is born!   Conception likely took place on may 26, 2018.   You are 8 weeks and 2 days pregnant. (Fetal age: 6 weeks and 2 days)   The baby will be born during winter time.   You are in the first trimester.   The first trimester began on may 12, 2018.   The second trimester (weeks 13-27) begins on august 11, 2018.   The third trimester (weeks 28-40) begins on november 24, 2018.   Your safe range to give birth: january 26, 2019 to march 02, 2019 (full term).  February 2019   Sun Mon Tue Wed Thu Fri Sat        1 2   3 4 5 6 7 8 9   10 11 12 13 14 15 16   17 18 19 20 21 22 23   24 25 26 27 28     Add event to your [place] Calendar  58 days have passed since the start of your pregnancy. There are 222 days left until your due date.  21% of your pregnancy is completed.    Pregnancy    Your exam today shows that you are pregnant.  Pregnancy symptoms  During pregnancy your body s hormones change. This causes physical and emotional changes. This is normal. Knowing what to expect is important for your piece of mind and so you know when to seek help for a problem. Here are some of the most common symptoms:    Morning sickness or nausea. This can happen any time of the day or night.    Tender, swollen breasts    Need to urinate frequently    Tiredness or fatigue    Dizziness    Indigestion or heartburn    Food cravings or turn-offs    Constipation    Emotional changes. This can range from anxiety to excitement to depression.  General care for a healthy pregnancy  Here are things you can do to help make sure your baby is born healthy:    Rest when you feel tired. This is especially  true in the later months of pregnancy.    Drink more fluids. Your body needs more fluids than you may be used to. Drink 8 to10 glasses of juice, milk, or water every day.    Eat well-balanced meals. Eat at regular times to give your body enough protein. You can expect to gain about 30 pounds during the pregnancy. Don t try to diet or lose weight while you are pregnant.    Take a prenatal vitamin every day. This helps you meet the extra nutritional needs of pregnancy.    Don t take any other medicine during your pregnancy unless your healthcare provider tells you to. This includes prescription medicines and those you buy over the counter. Many medicines can harm the growing baby.    If you have nausea or vomiting, don t eat greasy or fried foods. Eat several smaller meals throughout the day rather than 3 large meals.    If you smoke, you must stop. The nicotine you breathe in goes right to the baby.    Stay away from alcohol, even in moderate amounts. Daily drinking will harm your baby and can cause permanent brain damage.    Don t use recreational drugs, especially cocaine, crack, and heroin. These will harm your baby. Also avoid marijuana.    If you were using recreational drugs or prescribed medicine when you found out that you were pregnant, talk with your healthcare provider about possible effects on your growing baby.    If you have medical problems that you need to take medicine for, talk with your healthcare provider.  Follow-up care  Call your healthcare provider to arrange for prenatal care. Prenatal care is important. You can see your family provider, a pregnancy specialist (obstetrician), or a primary care clinic.  When to seek medical advice  Call your healthcare provider right away if any of these occur:    Vaginal bleeding    Pain in your belly (abdomen) or back that is moderate or severe    Lots of vomiting, or you can t keep any fluids down for 6 hours    Burning feeling when you urinate    Headache,  dizziness, or rapid weight gain    Fever    Vision changes or blurred vision  Date Last Reviewed: 10/1/2016    8324-5018 [employer]. [address] [place] [address], [place] [address]. All rights reserved. This information is not intended as a substitute for professional medical care. Always follow your healthcare professional's instructions.

## 2018-07-09 NOTE — PROGRESS NOTES
SUBJECTIVE:   Heron Gee is a 33 year old female who presents to clinic today for the following health issues:      Patient is here for a pregnancy test  LMP: 5/12/18  Test done at home : yes  Result: positive 1 week ago. Has one child- 2.5 years old.  She is not currently taking a prenatal vitamin. She does smoke and is trying to cut back. Discussed risks of smoking while pregnant. No alcohol use since + pregnancy test.  No sx of pregnancy besides missed period.     Problem list and histories reviewed & adjusted, as indicated.  Additional history: as documented    Patient Active Problem List   Diagnosis     Supervision of normal first pregnancy     Obesity     Family history of diabetes mellitus     Past Surgical History:   Procedure Laterality Date     APPENDECTOMY      as a child     deviated septum      teenager       Social History   Substance Use Topics     Smoking status: Current Every Day Smoker     Packs/day: 0.50     Smokeless tobacco: Never Used     Alcohol use 0.0 oz/week     0 Standard drinks or equivalent per week      Comment: occ     Family History   Problem Relation Age of Onset     Diabetes Brother      Hypertension No family hx of      Breast Cancer No family hx of      Ovarian Cancer No family hx of      Cerebrovascular Disease No family hx of      Thyroid Disease No family hx of          Current Outpatient Prescriptions   Medication Sig Dispense Refill     Fexofenadine-Pseudoephedrine (ALLEGRA-D 12 HOUR PO)        No Known Allergies  BP Readings from Last 3 Encounters:   07/09/18 113/75   03/17/17 112/73   02/10/17 111/73    Wt Readings from Last 3 Encounters:   07/09/18 190 lb (86.2 kg)   03/17/17 174 lb (78.9 kg)   02/10/17 177 lb (80.3 kg)                  Labs reviewed in EPIC    Reviewed and updated as needed this visit by clinical staff  Tobacco  Meds  Med Hx  Surg Hx  Fam Hx  Soc Hx      Reviewed and updated as needed this visit by Provider         ROS:  Constitutional, HEENT,  "cardiovascular, pulmonary, GI, , musculoskeletal, neuro, skin, endocrine and psych systems are negative, except as otherwise noted.    OBJECTIVE:     /75  Pulse 122  Temp 99.1  F (37.3  C) (Oral)  Resp 16  Ht 4' 11.45\" (1.51 m)  Wt 190 lb (86.2 kg)  LMP 05/12/2018 (Approximate)  SpO2 97%  Breastfeeding? No  BMI 37.8 kg/m2  Body mass index is 37.8 kg/(m^2).  GENERAL: healthy, alert and no distress  NECK: no adenopathy, no asymmetry, masses, or scars and thyroid normal to palpation  RESP: lungs clear to auscultation - no rales, rhonchi or wheezes  CV: regular rate and rhythm, normal S1 S2, no S3 or S4, no murmur, click or rub, no peripheral edema and peripheral pulses strong  ABDOMEN: soft, nontender, no hepatosplenomegaly, no masses and bowel sounds normal  PSYCH: mentation appears normal, affect normal/bright    Diagnostic Test Results:  See orders    ASSESSMENT/PLAN:         ICD-10-CM    1. Positive pregnancy test Z32.01 Beta HCG qual IFA urine     OB/GYN REFERRAL   2. Maternal tobacco use in first trimester O99.331 TOBACCO CESSATION - FOR HEALTH MAINTENANCE       See Patient Instructions: Take daily prenatal vitamin with food. Try to cut back on your smoking.  No alcohol is safe in pregnancy.  CDC has the drug site to see that is safe during pregnancy-https://www.cdc.gov/pregnancy/meds/treatingfortwo/index.html  https://www.aafp.org/afp/2003/0615/p2517.html    Saturday, february 16, 2019   Only 31 weeks and 5 days left until your baby is born!   Conception likely took place on may 26, 2018.   You are 8 weeks and 2 days pregnant. (Fetal age: 6 weeks and 2 days)   The baby will be born during winter time.   You are in the first trimester.   The first trimester began on may 12, 2018.   The second trimester (weeks 13-27) begins on august 11, 2018.   The third trimester (weeks 28-40) begins on november 24, 2018.   Your safe range to give birth: january 26, 2019 to march 02, 2019 (full term).  February " 2019   Sun Mon Tue Wed Thu Fri Sat        1 2   3 4 5 6 7 8 9   10 11 12 13 14 15 16   17 18 19 20 21 22 23   24 25 26 27 28     Add event to your [place] Calendar  58 days have passed since the start of your pregnancy. There are 222 days left until your due date.  21% of your pregnancy is completed.    Dana Borja, JORDI  [facility] [place]

## 2018-07-12 ENCOUNTER — MYC MEDICAL ADVICE (OUTPATIENT)
Dept: OBGYN | Facility: CLINIC | Age: 33
End: 2018-07-12

## 2018-07-12 NOTE — TELEPHONE ENCOUNTER
LMP 05-12-18 8w 5d  Phone call from patient. She reported about 11:10 today she was sitting on the sofa and she felt like she had some blood. Patient went to the bathroom and noted some blood on the toilet tissue. Patient stated she has a panty liner in and still some blood in it. Patient noted she passed a small clot. No cramping and no heavy bleeding. Explained about subchorionic hemorrhage and recommended pelvic rest. Attempted to reassure patient that no red flag sx's at this time. Patient stated she has an appt with Dr. Agbeh tomorrow at 1330. Patient will call back if sx's change or if she has red flag sx's. Patient appreciative of assistance. Marjan Carbajal RN, BAN

## 2018-07-13 ENCOUNTER — RADIANT APPOINTMENT (OUTPATIENT)
Dept: ULTRASOUND IMAGING | Facility: CLINIC | Age: 33
End: 2018-07-13
Attending: OBSTETRICS & GYNECOLOGY
Payer: COMMERCIAL

## 2018-07-13 ENCOUNTER — PRENATAL OFFICE VISIT (OUTPATIENT)
Dept: OBGYN | Facility: CLINIC | Age: 33
End: 2018-07-13
Payer: COMMERCIAL

## 2018-07-13 VITALS
BODY MASS INDEX: 36.71 KG/M2 | HEIGHT: 60 IN | WEIGHT: 187 LBS | DIASTOLIC BLOOD PRESSURE: 81 MMHG | HEART RATE: 106 BPM | TEMPERATURE: 98.8 F | SYSTOLIC BLOOD PRESSURE: 117 MMHG

## 2018-07-13 DIAGNOSIS — O20.0 THREATENED ABORTION: Primary | ICD-10-CM

## 2018-07-13 DIAGNOSIS — O20.0 THREATENED ABORTION: ICD-10-CM

## 2018-07-13 LAB
B-HCG SERPL-ACNC: 466 IU/L (ref 0–5)
ERYTHROCYTE [DISTWIDTH] IN BLOOD BY AUTOMATED COUNT: 13.4 % (ref 10–15)
HCT VFR BLD AUTO: 42.4 % (ref 35–47)
HGB BLD-MCNC: 14.7 G/DL (ref 11.7–15.7)
MCH RBC QN AUTO: 30.1 PG (ref 26.5–33)
MCHC RBC AUTO-ENTMCNC: 34.7 G/DL (ref 31.5–36.5)
MCV RBC AUTO: 87 FL (ref 78–100)
PLATELET # BLD AUTO: 158 10E9/L (ref 150–450)
PROGEST SERPL-MCNC: 2.6 NG/ML
RBC # BLD AUTO: 4.89 10E12/L (ref 3.8–5.2)
WBC # BLD AUTO: 12 10E9/L (ref 4–11)

## 2018-07-13 PROCEDURE — 86900 BLOOD TYPING SEROLOGIC ABO: CPT | Performed by: OBSTETRICS & GYNECOLOGY

## 2018-07-13 PROCEDURE — 99214 OFFICE O/P EST MOD 30 MIN: CPT | Performed by: OBSTETRICS & GYNECOLOGY

## 2018-07-13 PROCEDURE — 84144 ASSAY OF PROGESTERONE: CPT | Performed by: OBSTETRICS & GYNECOLOGY

## 2018-07-13 PROCEDURE — 76801 OB US < 14 WKS SINGLE FETUS: CPT

## 2018-07-13 PROCEDURE — 36415 COLL VENOUS BLD VENIPUNCTURE: CPT | Performed by: OBSTETRICS & GYNECOLOGY

## 2018-07-13 PROCEDURE — 76817 TRANSVAGINAL US OBSTETRIC: CPT

## 2018-07-13 PROCEDURE — 86850 RBC ANTIBODY SCREEN: CPT | Performed by: OBSTETRICS & GYNECOLOGY

## 2018-07-13 PROCEDURE — 86901 BLOOD TYPING SEROLOGIC RH(D): CPT | Performed by: OBSTETRICS & GYNECOLOGY

## 2018-07-13 PROCEDURE — 85027 COMPLETE CBC AUTOMATED: CPT | Performed by: OBSTETRICS & GYNECOLOGY

## 2018-07-13 PROCEDURE — 84702 CHORIONIC GONADOTROPIN TEST: CPT | Performed by: OBSTETRICS & GYNECOLOGY

## 2018-07-13 NOTE — MR AVS SNAPSHOT
After Visit Summary   7/13/2018    Heron Gee    MRN: [id number]           Patient Information     Date Of Birth          1985        Visit Information        Provider Department      7/13/2018 1:30 PM Agbeh, Cephas Mawuena, MD [facility]        Today's Diagnoses     Missed menses    -  1      Care Instructions                                                        If you have any questions regarding your visit, Please contact your care team.    [facility] Access Services: [phone]      Women s Health CLINIC HOURS TELEPHONE NUMBER   Cephas Agbeh, M.D.    PANFILO Solis,     LUIS FERNANDO Phillip RN             Monday-[place]    8:00a.m-4:45 p.m    Tuesday--[place]     8:00a.m-4:45 p.m.    Thursday-[place]    8:00a.m-4:45 p.m.    Friday-[place]    8:00a.m-4:45 p.m    [facility]   [address] [place] [address]   [phone]-Ask for [facility]   Fax [phone]   Xjubxzc-[phone]     [facility]   [address] [place] [address]   [phone]     [facility]   [address] [facility] [address]   [phone]   Yoidswk-[phone]    Urgent Care locations:    [facility]  Monday-Friday   5 pm - 9 pm   Saturday and Sunday    9 am - 5 pm      Monday-Friday    11 pm - 9 pm  Saturday and Sunday   9 am - 5 pm    [phone]     If you need a medication refill, please contact your pharmacy. Please allow 3 business days for your refill to be completed.  As always, Thank you for trusting us with your healthcare needs!            Follow-ups after your visit        Your next 10 appointments already scheduled     Jul 13, 2018  2:00 PM CDT   US OB < 14 WEEKS SINGLE with FKUS1   [facility] ([facility])    [address] [place] [address] [phone]           Please bring a list of your  medicines (including vitamins, minerals and over-the-counter drugs). Also, tell your doctor about any allergies you may have. Wear comfortable clothes and leave your valuables at home.  Drink four 8-ounce glasses of fluid an hour before your exam. If you need to empty your bladder before your exam, try to release only a little urine. Then, drink another glass of fluid.  You may have up to two family members in the exam room. If you bring a small child, an adult must be there to care for him or her. No video or camera photography during the procedure.  Please call the Imaging Department at your exam site with any questions.              Future tests that were ordered for you today     Open Future Orders        Priority Expected Expires Ordered    US OB < 14 Weeks Single Routine 7/13/2018 10/11/2018 7/13/2018            Who to contact     If you have questions or need follow up information about today's clinic visit or your schedule please contact [facility] directly at [phone].  Normal or non-critical lab and imaging results will be communicated to you by BioArrayt, letter or phone within 4 business days after the clinic has received the results. If you do not hear from us within 7 days, please contact the clinic through Wordy or phone. If you have a critical or abnormal lab result, we will notify you by phone as soon as possible.  Submit refill requests through Wordy or call your pharmacy and they will forward the refill request to us. Please allow 3 business days for your refill to be completed.          Additional Information About Your Visit        Wordy Information     Wordy gives you secure access to your electronic health record. If you see a primary care provider, you can also send messages to your care team and make appointments. If you have questions, please call your primary care clinic.  If you do not have a primary care provider, please call [phone] and they will assist  "you.        Care EveryWhere ID     This is your Care EveryWhere ID. This could be used by other organizations to access your [facility] medical records  [id number]        Your Vitals Were     Pulse Temperature Height Last Period Breastfeeding? BMI (Body Mass Index)    106 98.8  F (37.1  C) (Tympanic) 4' 11.75\" (1.518 m) 05/12/2018 (Approximate) No 36.83 kg/m2       Blood Pressure from Last 3 Encounters:   07/13/18 117/81   07/09/18 113/75   03/17/17 112/73    Weight from Last 3 Encounters:   07/13/18 187 lb (84.8 kg)   07/09/18 190 lb (86.2 kg)   03/17/17 174 lb (78.9 kg)               Primary Care Provider Office Phone # Fax #    Mat Mawuena Agbeh, [phone] [address] [place] [address]        Equal Access to Services     [facility]: Hadii aad ku hadasho Soomaali, waaxda luqadaha, qaybta kaalmada adeegyada, waxay idiin hayaan adeeg kharacharley la'oscarn . So [facility] [phone].    ATENCIÓN: Si habla español, tiene a lucas disposición servicios gratuitos de asistencia lingüística. Em al [phone].    We comply with applicable federal civil rights laws and [place] laws. We do not discriminate on the basis of race, color, national origin, age, disability, sex, sexual orientation, or gender identity.            Thank you!     Thank you for choosing [facility]  for your care. Our goal is always to provide you with excellent care. Hearing back from our patients is one way we can continue to improve our services. Please take a few minutes to complete the written survey that you may receive in the mail after your visit with us. Thank you!             Your Updated Medication List - Protect others around you: Learn how to safely use, store and throw away your medicines at www.disposemymeds.org.          This list is accurate as of 7/13/18  1:31 PM.  Always use your most recent med list.                   Brand Name Dispense Instructions for use Diagnosis    ALLEGRA-D 12 " HOUR PO       Cough

## 2018-07-13 NOTE — PATIENT INSTRUCTIONS
If you have any questions regarding your visit, Please contact your care team.    [facility]: [phone]      [facility] CLINIC HOURS TELEPHONE NUMBER   Cephas Agbeh, M.D.    PANFILO Solis,     LUIS FERNANDO Phillip, LUIS FERNANDO             Monday-[place]    8:00a.m-4:45 p.m    Tuesday--[place]     8:00a.m-4:45 p.m.    Thursday-[place]    8:00a.m-4:45 p.m.    Friday-[place]    8:00a.m-4:45 p.m    [facility]   [address]   [place] [address] [phone]-Ask for [facility]   Fax [phone]   Mlnbvrx-[phone]     [facility]   [address] [place] [address] [address].   [place], [place] [address] [phone]     [facility]   [address] [facility] [address] [phone]   Mzlubxj-[phone]    Urgent Care locations:    [facility]  Monday-Friday   5 pm - 9 pm   Saturday and Sunday    9 am - 5 pm      Monday-Friday    11 pm - 9 pm  Saturday and Sunday   9 am - 5 pm    [phone]     If you need a medication refill, please contact your pharmacy. Please allow 3 business days for your refill to be completed.  As always, Thank you for trusting us with your healthcare needs!

## 2018-07-13 NOTE — PROGRESS NOTES
"Heron is a 33 year old  @ 8 weeks 5 days by LMP. referred here by elke for consultation regarding vaginal bleeding starting yesterday with some clots. Pregnancy confirmation was on 18      ROS: Ten point review of systems was reviewed and negative except the above.    Gyne: - abn pap (last pap ), - STD's    Past Medical History:   Diagnosis Date     ASCUS on Pap smear 4/17/15    Plan Pap/HPV at 6 wk PP exam     Past Surgical History:   Procedure Laterality Date     APPENDECTOMY      as a child     deviated septum      teenager     Patient Active Problem List   Diagnosis     Supervision of normal first pregnancy     Obesity     Family history of diabetes mellitus       ALL/Meds: Her medication and allergy histories were reviewed and are documented in their appropriate chart areas.    SH: - tob, - EtOH,     FH: Her family history was reviewed and documented in its appropriate chart area.    PE: /81  Pulse 106  Temp 98.8  F (37.1  C) (Tympanic)  Ht 4' 11.75\" (1.518 m)  Wt 187 lb (84.8 kg)  LMP 2018 (Approximate)  Breastfeeding? No  BMI 36.83 kg/m2  Body mass index is 36.83 kg/(m^2).    General Appearance:  healthy, alert, active, no distress  HEENT: NCAT  Abdomen: Soft, nontender.  Normal bowel sounds.  No masses  Pelvic:       - Ext: NEFG,        - Urethra: no lesions, no masses, no hypermobility       - Urethral Meatus: normal appearance,        - Bladder: no tenderness, no masses       - Vagina: pink, moist, normal rugae, no lesions, bloody discharge       - Cervix: closed., parous       - Uterus: normal sized, AV, mobile,  contour       - Adnexa: no masses, no tenderness        A/P    ICD-10-CM    1. Threatened  O20.0 US OB < 14 Weeks Single      Ultrasound. Discussed risk of miscarriage.    25 minutes was spent face to face with the patient today discussing her history, diagnosis, and follow-up plan as noted above.  Over 50% of the visit was spent in counseling and " coordination of care.    Total Visit Time: 30 minutes.    CEPHAS AGBEH, MD.

## 2018-07-16 DIAGNOSIS — O20.0 THREATENED ABORTION: ICD-10-CM

## 2018-07-16 LAB
ABO + RH BLD: NORMAL
ABO + RH BLD: NORMAL
B-HCG SERPL-ACNC: 96 IU/L (ref 0–5)
BLD GP AB SCN SERPL QL: NORMAL
BLOOD BANK CMNT PATIENT-IMP: NORMAL
PROGEST SERPL-MCNC: 0.8 NG/ML
SPECIMEN EXP DATE BLD: NORMAL

## 2018-07-16 PROCEDURE — 84144 ASSAY OF PROGESTERONE: CPT | Performed by: OBSTETRICS & GYNECOLOGY

## 2018-07-16 PROCEDURE — 84702 CHORIONIC GONADOTROPIN TEST: CPT | Performed by: OBSTETRICS & GYNECOLOGY

## 2018-07-16 PROCEDURE — 36415 COLL VENOUS BLD VENIPUNCTURE: CPT | Performed by: OBSTETRICS & GYNECOLOGY

## 2018-07-30 DIAGNOSIS — O20.0 THREATENED ABORTION: ICD-10-CM

## 2018-07-30 LAB — B-HCG SERPL-ACNC: <1 IU/L (ref 0–5)

## 2018-07-30 PROCEDURE — 84702 CHORIONIC GONADOTROPIN TEST: CPT | Performed by: OBSTETRICS & GYNECOLOGY

## 2018-07-30 PROCEDURE — 36415 COLL VENOUS BLD VENIPUNCTURE: CPT | Performed by: OBSTETRICS & GYNECOLOGY

## 2018-12-26 ENCOUNTER — ALLIED HEALTH/NURSE VISIT (OUTPATIENT)
Dept: NURSING | Facility: CLINIC | Age: 33
End: 2018-12-26
Payer: COMMERCIAL

## 2018-12-26 DIAGNOSIS — Z23 NEED FOR PROPHYLACTIC VACCINATION AND INOCULATION AGAINST INFLUENZA: Primary | ICD-10-CM

## 2018-12-26 PROCEDURE — 90471 IMMUNIZATION ADMIN: CPT

## 2018-12-26 PROCEDURE — 99207 ZZC NO CHARGE NURSE ONLY: CPT

## 2018-12-26 PROCEDURE — 90686 IIV4 VACC NO PRSV 0.5 ML IM: CPT

## 2019-01-16 ENCOUNTER — MYC MEDICAL ADVICE (OUTPATIENT)
Dept: OBGYN | Facility: CLINIC | Age: 34
End: 2019-01-16

## 2019-01-17 ENCOUNTER — OFFICE VISIT (OUTPATIENT)
Dept: FAMILY MEDICINE | Facility: CLINIC | Age: 34
End: 2019-01-17
Payer: COMMERCIAL

## 2019-01-17 VITALS
SYSTOLIC BLOOD PRESSURE: 120 MMHG | RESPIRATION RATE: 16 BRPM | TEMPERATURE: 98.4 F | HEART RATE: 99 BPM | WEIGHT: 207 LBS | HEIGHT: 60 IN | BODY MASS INDEX: 40.64 KG/M2 | OXYGEN SATURATION: 98 % | DIASTOLIC BLOOD PRESSURE: 78 MMHG

## 2019-01-17 DIAGNOSIS — Z32.01 PREGNANCY TEST POSITIVE: ICD-10-CM

## 2019-01-17 DIAGNOSIS — N92.6 MISSED PERIOD: Primary | ICD-10-CM

## 2019-01-17 LAB — BETA HCG QUAL IFA URINE: POSITIVE

## 2019-01-17 PROCEDURE — 84703 CHORIONIC GONADOTROPIN ASSAY: CPT | Performed by: PHYSICIAN ASSISTANT

## 2019-01-17 PROCEDURE — 99213 OFFICE O/P EST LOW 20 MIN: CPT | Performed by: PHYSICIAN ASSISTANT

## 2019-01-17 ASSESSMENT — MIFFLIN-ST. JEOR: SCORE: 1567.51

## 2019-01-17 NOTE — TELEPHONE ENCOUNTER
Agbeh, Cephas Mawuena, MD   You 8 minutes ago (9:14 AM)      If she is far enough in her pregnancy, 6-8 weeks, the provider seing her for the confirmation can order the ultrasound.   CEPHAS AGBEH, MD.   (Routing comment)

## 2019-01-17 NOTE — PROGRESS NOTES
SUBJECTIVE:   Heron Gee is a 33 year old female who presents to clinic today for the following health issues:      Missed period--ob confirmation. Last menstrual cycle 11/28/18        Problem list and histories reviewed & adjusted, as indicated.  Additional history: as documented    BP Readings from Last 3 Encounters:   01/17/19 120/78   07/13/18 117/81   07/09/18 113/75    Wt Readings from Last 3 Encounters:   01/17/19 93.9 kg (207 lb)   07/13/18 84.8 kg (187 lb)   07/09/18 86.2 kg (190 lb)                    Reviewed and updated as needed this visit by clinical staff  Tobacco  Allergies  Meds       Reviewed and updated as needed this visit by Provider         All other systems negative except as outline above    OBJECTIVE:  Eye exam - right eye normal lid, conjunctiva, cornea, pupil and fundus, left eye normal lid, conjunctiva, cornea, pupil and fundus.  Thyroid not palpable, not enlarged, no nodules detected.  CHEST:chest clear to IPPA, no tachypnea, retractions or cyanosis and S1, S2 normal, no murmur, no gallop, rate regular.    Heron was seen today for prenatal care.    Diagnoses and all orders for this visit:    Missed period  -     Beta HCG Qual, Urine - [facility] and [place] ([id number])  -     US OB < 14 Weeks Single; Future    Pregnancy test positive  -     US OB < 14 Weeks Single; Future      work on lifestyle modification  Patient to see ob/gyn next week.

## 2019-01-19 ENCOUNTER — ANCILLARY PROCEDURE (OUTPATIENT)
Dept: ULTRASOUND IMAGING | Facility: CLINIC | Age: 34
End: 2019-01-19
Payer: COMMERCIAL

## 2019-01-19 DIAGNOSIS — Z32.01 PREGNANCY TEST POSITIVE: ICD-10-CM

## 2019-01-19 DIAGNOSIS — N92.6 MISSED PERIOD: ICD-10-CM

## 2019-01-19 PROCEDURE — 76801 OB US < 14 WKS SINGLE FETUS: CPT

## 2019-01-21 ENCOUNTER — PRENATAL OFFICE VISIT (OUTPATIENT)
Dept: OBGYN | Facility: CLINIC | Age: 34
End: 2019-01-21
Payer: COMMERCIAL

## 2019-01-21 VITALS
HEART RATE: 53 BPM | WEIGHT: 204 LBS | SYSTOLIC BLOOD PRESSURE: 109 MMHG | HEIGHT: 60 IN | DIASTOLIC BLOOD PRESSURE: 73 MMHG | BODY MASS INDEX: 40.05 KG/M2 | TEMPERATURE: 98.5 F

## 2019-01-21 DIAGNOSIS — O34.219 PREVIOUS CESAREAN DELIVERY, ANTEPARTUM CONDITION OR COMPLICATION: Primary | ICD-10-CM

## 2019-01-21 LAB
ABO + RH BLD: NORMAL
ABO + RH BLD: NORMAL
B-HCG SERPL-ACNC: ABNORMAL IU/L (ref 0–5)
BLD GP AB SCN SERPL QL: NORMAL
BLOOD BANK CMNT PATIENT-IMP: NORMAL
ERYTHROCYTE [DISTWIDTH] IN BLOOD BY AUTOMATED COUNT: 13.3 % (ref 10–15)
HCT VFR BLD AUTO: 43.6 % (ref 35–47)
HGB BLD-MCNC: 15.1 G/DL (ref 11.7–15.7)
MCH RBC QN AUTO: 30.1 PG (ref 26.5–33)
MCHC RBC AUTO-ENTMCNC: 34.6 G/DL (ref 31.5–36.5)
MCV RBC AUTO: 87 FL (ref 78–100)
PLATELET # BLD AUTO: 184 10E9/L (ref 150–450)
RBC # BLD AUTO: 5.01 10E12/L (ref 3.8–5.2)
SPECIMEN EXP DATE BLD: NORMAL
WBC # BLD AUTO: 11.6 10E9/L (ref 4–11)

## 2019-01-21 PROCEDURE — 99207 ZZC FIRST OB VISIT: CPT | Performed by: OBSTETRICS & GYNECOLOGY

## 2019-01-21 PROCEDURE — 87389 HIV-1 AG W/HIV-1&-2 AB AG IA: CPT | Performed by: OBSTETRICS & GYNECOLOGY

## 2019-01-21 PROCEDURE — 86901 BLOOD TYPING SEROLOGIC RH(D): CPT | Performed by: OBSTETRICS & GYNECOLOGY

## 2019-01-21 PROCEDURE — 87340 HEPATITIS B SURFACE AG IA: CPT | Performed by: OBSTETRICS & GYNECOLOGY

## 2019-01-21 PROCEDURE — 86780 TREPONEMA PALLIDUM: CPT | Performed by: OBSTETRICS & GYNECOLOGY

## 2019-01-21 PROCEDURE — 86762 RUBELLA ANTIBODY: CPT | Performed by: OBSTETRICS & GYNECOLOGY

## 2019-01-21 PROCEDURE — 85027 COMPLETE CBC AUTOMATED: CPT | Performed by: OBSTETRICS & GYNECOLOGY

## 2019-01-21 PROCEDURE — 87086 URINE CULTURE/COLONY COUNT: CPT | Performed by: OBSTETRICS & GYNECOLOGY

## 2019-01-21 PROCEDURE — 84702 CHORIONIC GONADOTROPIN TEST: CPT | Performed by: OBSTETRICS & GYNECOLOGY

## 2019-01-21 PROCEDURE — 86850 RBC ANTIBODY SCREEN: CPT | Performed by: OBSTETRICS & GYNECOLOGY

## 2019-01-21 PROCEDURE — 36415 COLL VENOUS BLD VENIPUNCTURE: CPT | Performed by: OBSTETRICS & GYNECOLOGY

## 2019-01-21 PROCEDURE — 86900 BLOOD TYPING SEROLOGIC ABO: CPT | Performed by: OBSTETRICS & GYNECOLOGY

## 2019-01-21 RX ORDER — PRENATAL VIT/IRON FUM/FOLIC AC 27MG-0.8MG
1 TABLET ORAL EVERY MORNING
COMMUNITY
End: 2021-06-21

## 2019-01-21 ASSESSMENT — MIFFLIN-ST. JEOR: SCORE: 1553.9

## 2019-01-21 NOTE — PROGRESS NOTES
"Heron is a 33 year old  @  7w0d weeks here for new ob visit.    Due to her miscarriage, she prefers to postpone her pelvic exam.  She plans a repeat C/S.  See Ob questionnaire for pertinent components of HPI.  Current Issues include: none    Obstetric History       T0      L1     SAB0   TAB0   Ectopic0   Multiple0   Live Births1       # Outcome Date GA Lbr Master/2nd Weight Sex Delivery Anes PTL Lv   3 Current            2 Para 12/06/15 40w0d  3.515 kg (7 lb 12 oz) F CS-LTranv  N KHOI      Name: Alexander Arthur                  Gyne: Pap smears Normal  Past Medical History:   Diagnosis Date     ASCUS on Pap smear 4/17/15    Plan Pap/HPV at 6 wk PP exam     Past Surgical History:   Procedure Laterality Date     APPENDECTOMY      as a child     deviated septum      teenager     Patient Active Problem List    Diagnosis Date Noted     Previous  delivery, antepartum condition or complication 2019     Priority: Medium     Supervision of normal first pregnancy 2015     Priority: Medium     Obesity 2015     Priority: Medium     Family history of diabetes mellitus 2015     Priority: Medium      No Known Allergies  Current Outpatient Medications   Medication Sig Dispense Refill     Fexofenadine-Pseudoephedrine (ALLEGRA-D 12 HOUR PO)        Prenatal Vit-Fe Fumarate-FA (PRENATAL MULTIVITAMIN W/IRON) 27-0.8 MG tablet Take 1 tablet by mouth daily         Past Medical History of Father of Baby:   No significant medical history    Physical Exam: /73   Pulse 53   Temp 98.5  F (36.9  C) (Tympanic)   Ht 1.511 m (4' 11.5\")   Wt 92.5 kg (204 lb)   LMP 2018   Breastfeeding? No   BMI 40.51 kg/m    General: Obese  Skin: Normal  HEENT: Normal  Neck: Supple,no adenopathy,thyroid normal  Chest: Clear  Heart: Regular rate, rhythm,No murmur, rub, gallop  Breasts: No masses, skin, nipple or axillary changes   Abdomen: Benign,Soft, flat, non-tender,No masses, organomegaly,No " inguinal nodes,Bowel sounds normoactive   Extremities: Normal  Neurological: Normal   PELVIC EXAM;DEFERRED  A/P 33 year old  at  7w0d weeks    ICD-10-CM    1. Previous  delivery, antepartum condition or complication O34.219 ABO/Rh type and screen     Hepatitis B surface antigen     Rubella Antibody IgG Quantitative     CBC with platelets     Urine Culture Aerobic Bacterial     HIV Antigen Antibody Combo     Treponema Abs w Reflex to RPR and Titer     HCG Quantitative Pregnancy, Blood ([id number])       - Discussed physician coverage, tertiary support, diet, exercise, weight gain, schedule of visits, routine and indicated ultrasounds, and childbirth education.    - Options for  testing for chromosomal and birth defects were discussed with the patient.  Diagnostic tests include CVS and Amniocentesis.  We discussed that these tests are definitive but invasive and do carry a risk of fetal loss.    Screening tests include nuchal translucency/blood marker testing in the first trimester and quad screening in the second trimester.  We discussed that these are screening tests and not diagnostic tests and that false positives and negatives are a distinct possibility.       return to clinic in 4 weeks.    CEPHAS AGBEH, MD.

## 2019-01-21 NOTE — PATIENT INSTRUCTIONS
If you have any questions regarding your visit, Please contact your care team.    [facility] Access Services: [phone]      [facility] CLINIC HOURS TELEPHONE NUMBER   Cephas Agbeh, M.D.    PANFILO Solis,     LUIS FERNANDO Phillip, LUIS FERNANDO             Monday-[place]    8:00a.m-4:45 p.m    Tuesday--[place]     8:00a.m-4:45 p.m.    Thursday-[place]    8:00a.m-4:45 p.m.    Friday-[place]    8:00a.m-4:45 p.m    [facility]   [address]   [place] [address] [phone]-Ask for [facility]   Fax [phone]   Jsgmsmy-[phone]     [facility]   [address] [place] [address] Dr.   [place], [place] [address] [phone]     [facility]   [address] [facility] [address] [phone]   Tdmjypg-[phone]    Urgent Care locations:    [facility]  Monday-Friday   5 pm - 9 pm   Saturday and Sunday    9 am - 5 pm      Monday-Friday    11 pm - 9 pm  Saturday and Sunday   9 am - 5 pm    [phone]     If you need a medication refill, please contact your pharmacy. Please allow 3 business days for your refill to be completed.  As always, Thank you for trusting us with your healthcare needs!

## 2019-01-22 LAB
HBV SURFACE AG SERPL QL IA: NONREACTIVE
HIV 1+2 AB+HIV1 P24 AG SERPL QL IA: NONREACTIVE
RUBV IGG SERPL IA-ACNC: 79 IU/ML
T PALLIDUM AB SER QL: NONREACTIVE

## 2019-01-25 LAB
BACTERIA SPEC CULT: NORMAL
SPECIMEN SOURCE: NORMAL

## 2019-02-18 ENCOUNTER — PRENATAL OFFICE VISIT (OUTPATIENT)
Dept: OBGYN | Facility: CLINIC | Age: 34
End: 2019-02-18
Payer: COMMERCIAL

## 2019-02-18 VITALS
HEIGHT: 60 IN | HEART RATE: 101 BPM | BODY MASS INDEX: 39.82 KG/M2 | SYSTOLIC BLOOD PRESSURE: 114 MMHG | OXYGEN SATURATION: 96 % | TEMPERATURE: 98.4 F | WEIGHT: 202.8 LBS | DIASTOLIC BLOOD PRESSURE: 76 MMHG

## 2019-02-18 DIAGNOSIS — Z34.81 ENCOUNTER FOR SUPERVISION OF OTHER NORMAL PREGNANCY IN FIRST TRIMESTER: Primary | ICD-10-CM

## 2019-02-18 PROCEDURE — 99207 ZZC PRENATAL VISIT: CPT | Performed by: OBSTETRICS & GYNECOLOGY

## 2019-02-18 ASSESSMENT — PAIN SCALES - GENERAL: PAINLEVEL: NO PAIN (0)

## 2019-02-18 ASSESSMENT — MIFFLIN-ST. JEOR: SCORE: 1552.42

## 2019-02-18 NOTE — PROGRESS NOTES
11w0d Eating well.  Nausea . [place] 2 days from work. Intermtitent abssence for work forms filled. return to clinic in 4 weeks.    ICD-10-CM    1. Encounter for supervision of other normal pregnancy in first trimester Z34.81      CEPHAS AGBEH, MD.

## 2019-02-19 ENCOUNTER — TELEPHONE (OUTPATIENT)
Dept: OBGYN | Facility: CLINIC | Age: 34
End: 2019-02-19

## 2019-02-19 ENCOUNTER — TELEPHONE (OUTPATIENT)
Dept: OBGYN | Facility: CLINIC | Age: 34
End: 2019-02-19
Payer: COMMERCIAL

## 2019-02-19 NOTE — TELEPHONE ENCOUNTER
Just letting Dr Agbeh know patient has enrolled in the healthy pregnancy program. No call back needed.

## 2019-03-21 ENCOUNTER — PRENATAL OFFICE VISIT (OUTPATIENT)
Dept: OBGYN | Facility: CLINIC | Age: 34
End: 2019-03-21
Payer: COMMERCIAL

## 2019-03-21 VITALS
DIASTOLIC BLOOD PRESSURE: 71 MMHG | WEIGHT: 203.4 LBS | SYSTOLIC BLOOD PRESSURE: 106 MMHG | OXYGEN SATURATION: 96 % | BODY MASS INDEX: 40.06 KG/M2 | HEART RATE: 99 BPM

## 2019-03-21 DIAGNOSIS — O34.219 PREVIOUS CESAREAN DELIVERY, ANTEPARTUM CONDITION OR COMPLICATION: Primary | ICD-10-CM

## 2019-03-21 PROCEDURE — 99207 ZZC PRENATAL VISIT: CPT | Performed by: OBSTETRICS & GYNECOLOGY

## 2019-03-21 RX ORDER — LORATADINE 10 MG/1
10 TABLET ORAL EVERY MORNING
COMMUNITY
End: 2019-10-28 | Stop reason: ALTCHOICE

## 2019-03-21 NOTE — PROGRESS NOTES
15w3d Eating well.  . Plans repeat C/S .Declined quad screen. Pap/GC cultures at next visit. Plan for 20wk US. return to clinic in 4 weeks.    ICD-10-CM    1. Previous  delivery, antepartum condition or complication O34.219 US OB > 14 Weeks     CEPHAS AGBEH, MD.

## 2019-03-21 NOTE — PATIENT INSTRUCTIONS
If you have any questions regarding your visit, Please contact your care team.    [employer] Access Services: [phone]      [facility] CLINIC HOURS TELEPHONE NUMBER   Cephas Agbeh, M.D.    Marjan Worrell -         Monday-[facility]  8:00 am - 5 pm  Tuesday- [facility]  8:00am- 5 pm  Wednesday- Off  Thursday- [facility]  8:00 am- 5 pm  Friday-[place]  8:00 am 5 pm [facility]  [address]  [place], [place] [address] [phone] ask for [facility]    Imaging Dxgjrrpwzp-[phone]    [facility]  [address] [facility]  [place] [address] [phone]  Imaging Djcgwegoqe-[phone]     Urgent Care locations:    [facility] Saturday and Sunday   9 am - 5 pm    Monday-Friday   12 pm - 8 pm  Saturday and Sunday   9 am - 5 pm   [phone]       If you need a medication refill, please contact your pharmacy. Please allow 3 business days for your refill to be completed.  As always, Thank you for trusting us with your healthcare needs!

## 2019-03-24 ENCOUNTER — NURSE TRIAGE (OUTPATIENT)
Dept: NURSING | Facility: CLINIC | Age: 34
End: 2019-03-24

## 2019-03-24 ENCOUNTER — APPOINTMENT (OUTPATIENT)
Dept: ULTRASOUND IMAGING | Facility: CLINIC | Age: 34
End: 2019-03-24
Attending: EMERGENCY MEDICINE
Payer: COMMERCIAL

## 2019-03-24 ENCOUNTER — HOSPITAL ENCOUNTER (EMERGENCY)
Facility: CLINIC | Age: 34
Discharge: HOME | End: 2019-03-24
Attending: EMERGENCY MEDICINE | Admitting: EMERGENCY MEDICINE
Payer: COMMERCIAL

## 2019-03-24 VITALS
DIASTOLIC BLOOD PRESSURE: 70 MMHG | OXYGEN SATURATION: 98 % | RESPIRATION RATE: 16 BRPM | SYSTOLIC BLOOD PRESSURE: 117 MMHG | WEIGHT: 203 LBS | HEIGHT: 60 IN | TEMPERATURE: 98.5 F | BODY MASS INDEX: 39.85 KG/M2

## 2019-03-24 DIAGNOSIS — Z3A.15 15 WEEKS GESTATION OF PREGNANCY: ICD-10-CM

## 2019-03-24 DIAGNOSIS — R10.9 FLANK PAIN: ICD-10-CM

## 2019-03-24 DIAGNOSIS — R10.32 LLQ ABDOMINAL PAIN: ICD-10-CM

## 2019-03-24 LAB
ALBUMIN UR-MCNC: NEGATIVE MG/DL
ANION GAP SERPL CALCULATED.3IONS-SCNC: 8 MMOL/L (ref 3–14)
APPEARANCE UR: ABNORMAL
BACTERIA #/AREA URNS HPF: ABNORMAL /HPF
BASOPHILS # BLD AUTO: 0 10E9/L (ref 0–0.2)
BASOPHILS NFR BLD AUTO: 0.3 %
BILIRUB UR QL STRIP: NEGATIVE
BUN SERPL-MCNC: 10 MG/DL (ref 7–30)
CALCIUM SERPL-MCNC: 8.7 MG/DL (ref 8.5–10.1)
CHLORIDE SERPL-SCNC: 109 MMOL/L (ref 94–109)
CO2 SERPL-SCNC: 21 MMOL/L (ref 20–32)
COLOR UR AUTO: YELLOW
CREAT SERPL-MCNC: 0.57 MG/DL (ref 0.52–1.04)
DIFFERENTIAL METHOD BLD: NORMAL
EOSINOPHIL # BLD AUTO: 0.1 10E9/L (ref 0–0.7)
EOSINOPHIL NFR BLD AUTO: 0.6 %
ERYTHROCYTE [DISTWIDTH] IN BLOOD BY AUTOMATED COUNT: 13 % (ref 10–15)
GFR SERPL CREATININE-BSD FRML MDRD: >90 ML/MIN/{1.73_M2}
GLUCOSE SERPL-MCNC: 124 MG/DL (ref 70–99)
GLUCOSE UR STRIP-MCNC: NEGATIVE MG/DL
HCT VFR BLD AUTO: 40.7 % (ref 35–47)
HGB BLD-MCNC: 13.6 G/DL (ref 11.7–15.7)
HGB UR QL STRIP: NEGATIVE
IMM GRANULOCYTES # BLD: 0 10E9/L (ref 0–0.4)
IMM GRANULOCYTES NFR BLD: 0.4 %
KETONES UR STRIP-MCNC: 5 MG/DL
LEUKOCYTE ESTERASE UR QL STRIP: NEGATIVE
LYMPHOCYTES # BLD AUTO: 2 10E9/L (ref 0.8–5.3)
LYMPHOCYTES NFR BLD AUTO: 19.5 %
MCH RBC QN AUTO: 29.8 PG (ref 26.5–33)
MCHC RBC AUTO-ENTMCNC: 33.4 G/DL (ref 31.5–36.5)
MCV RBC AUTO: 89 FL (ref 78–100)
MONOCYTES # BLD AUTO: 0.4 10E9/L (ref 0–1.3)
MONOCYTES NFR BLD AUTO: 3.5 %
MUCOUS THREADS #/AREA URNS LPF: PRESENT /LPF
NEUTROPHILS # BLD AUTO: 7.8 10E9/L (ref 1.6–8.3)
NEUTROPHILS NFR BLD AUTO: 75.7 %
NITRATE UR QL: NEGATIVE
NRBC # BLD AUTO: 0 10*3/UL
NRBC BLD AUTO-RTO: 0 /100
PH UR STRIP: 5 PH (ref 5–7)
PLATELET # BLD AUTO: 154 10E9/L (ref 150–450)
POTASSIUM SERPL-SCNC: 3.5 MMOL/L (ref 3.4–5.3)
RBC # BLD AUTO: 4.57 10E12/L (ref 3.8–5.2)
RBC #/AREA URNS AUTO: 0 /HPF (ref 0–2)
SODIUM SERPL-SCNC: 138 MMOL/L (ref 133–144)
SOURCE: ABNORMAL
SP GR UR STRIP: 1.03 (ref 1–1.03)
SQUAMOUS #/AREA URNS AUTO: 1 /HPF (ref 0–1)
UROBILINOGEN UR STRIP-MCNC: 0 MG/DL (ref 0–2)
WBC # BLD AUTO: 10.2 10E9/L (ref 4–11)
WBC #/AREA URNS AUTO: 5 /HPF (ref 0–5)

## 2019-03-24 PROCEDURE — 99284 EMERGENCY DEPT VISIT MOD MDM: CPT | Mod: 25

## 2019-03-24 PROCEDURE — 81001 URINALYSIS AUTO W/SCOPE: CPT | Performed by: EMERGENCY MEDICINE

## 2019-03-24 PROCEDURE — 76770 US EXAM ABDO BACK WALL COMP: CPT

## 2019-03-24 PROCEDURE — 80048 BASIC METABOLIC PNL TOTAL CA: CPT | Performed by: FAMILY MEDICINE

## 2019-03-24 PROCEDURE — 87086 URINE CULTURE/COLONY COUNT: CPT | Performed by: EMERGENCY MEDICINE

## 2019-03-24 PROCEDURE — 85025 COMPLETE CBC W/AUTO DIFF WBC: CPT | Performed by: FAMILY MEDICINE

## 2019-03-24 PROCEDURE — 76815 OB US LIMITED FETUS(S): CPT

## 2019-03-24 PROCEDURE — 99284 EMERGENCY DEPT VISIT MOD MDM: CPT | Mod: Z6 | Performed by: EMERGENCY MEDICINE

## 2019-03-24 RX ORDER — ACETAMINOPHEN 500 MG
1000 TABLET ORAL ONCE
COMMUNITY
End: 2021-03-05

## 2019-03-24 ASSESSMENT — MIFFLIN-ST. JEOR: SCORE: 1557.3

## 2019-03-24 NOTE — ED PROVIDER NOTES
History     Chief Complaint   Patient presents with     Abdominal Pain     having left flank/abd pain she thought it was gas but now she is not sure, no UTI issues      HPI  Heron Gee is a 33 year old female with a history significant for a previous  section delivery with antepartum complication who presents to the ED for evaluation of abdominal pain and back pain. The patient is  and is currently 15 weeks pregnant. At around 7:00 this morning, the patient states that she began to feel feverish and developed pain in her LLQ abdomen and left lower back. She reportedly took a dose of Tylenol and the pain subsided. The pain then returned while she was calling the nurse line and she was advised to be further evaluated in the ED. Currently, the patient is not experiencing any pain. She denies any recent cough, nausea, or emesis.  She describes her pain is an ache and rates it a 3 out of 10.  She has not had any bowel or bladder dysfunction.  She denies any focal numbness weakness any extremity.  She is not had a rash.  She denies any vaginal bleeding or discharge.      Allergies:  No Known Allergies    Problem List:    Patient Active Problem List    Diagnosis Date Noted     Previous  delivery, antepartum condition or complication 2019     Priority: Medium     Supervision of normal first pregnancy 2015     Priority: Medium     Obesity 2015     Priority: Medium     Family history of diabetes mellitus 2015     Priority: Medium        Past Medical History:    Past Medical History:   Diagnosis Date     ASCUS on Pap smear 4/17/15       Past Surgical History:    Past Surgical History:   Procedure Laterality Date     APPENDECTOMY      as a child     deviated septum      teenager       Family History:    Family History   Problem Relation Age of Onset     Diabetes Brother      Hypertension No family hx of      Breast Cancer No family hx of      Ovarian Cancer No family hx of       Cerebrovascular Disease No family hx of      Thyroid Disease No family hx of        Social History:  Marital Status:  Single [1]  Social History     Tobacco Use     Smoking status: Current Every Day Smoker     Packs/day: 0.25     Types: Cigarettes     Smokeless tobacco: Never Used   Substance Use Topics     Alcohol use: No     Alcohol/week: 0.0 oz     Drug use: No        Medications:      loratadine (CLARITIN) 10 MG tablet   Prenatal Vit-Fe Fumarate-FA (PRENATAL MULTIVITAMIN W/IRON) 27-0.8 MG tablet         Review of Systems  All systems reviewed and other than pertinent positives and negatives in HPI all other systems are negative.  Physical Exam   BP: 117/70  Heart Rate: 103  Temp: 98.5  F (36.9  C)  Resp: 16  Height: 152.4 cm (5')  Weight: 92.1 kg (203 lb)  SpO2: 98 %      Physical Exam   Constitutional: She appears well-developed and well-nourished. No distress.   Eyes: Conjunctivae are normal.   Psychiatric: She has a normal mood and affect.   Nursing note and vitals reviewed.      HENT: Oral mucosa moist. No lesions.  Neck: Supple.  Pulmonary/Chest: Lungs are clear to auscultation bilaterally.  Cardiovascular: Heart is regular rate and rhythm. No murmur.  Abdomen: Soft, non-distended, mild tenderness to palpation of the left flank region and left mid to lower abdomen.  Patient is slightly gravid with no distention.  No rebound or guarding are present.  Bowel sounds are positive.  Musculoskeletal: Moving all extremities well. No peripheral edema.   Neurological: Alert. No focal neurologic deficit.   Skin: No rash.      ED Course        Procedures               Critical Care time:  none               Results for orders placed or performed during the hospital encounter of 03/24/19 (from the past 24 hour(s))   CBC with platelets, differential   Result Value Ref Range    WBC 10.2 4.0 - 11.0 10e9/L    RBC Count 4.57 3.8 - 5.2 10e12/L    Hemoglobin 13.6 11.7 - 15.7 g/dL    Hematocrit 40.7 35.0 - 47.0 %    MCV 89 78 -  100 fl    MCH 29.8 26.5 - 33.0 pg    MCHC 33.4 31.5 - 36.5 g/dL    RDW 13.0 10.0 - 15.0 %    Platelet Count 154 150 - 450 10e9/L    Diff Method Automated Method     % Neutrophils 75.7 %    % Lymphocytes 19.5 %    % Monocytes 3.5 %    % Eosinophils 0.6 %    % Basophils 0.3 %    % Immature Granulocytes 0.4 %    Nucleated RBCs 0 0 /100    Absolute Neutrophil 7.8 1.6 - 8.3 10e9/L    Absolute Lymphocytes 2.0 0.8 - 5.3 10e9/L    Absolute Monocytes 0.4 0.0 - 1.3 10e9/L    Absolute Eosinophils 0.1 0.0 - 0.7 10e9/L    Absolute Basophils 0.0 0.0 - 0.2 10e9/L    Abs Immature Granulocytes 0.0 0 - 0.4 10e9/L    Absolute Nucleated RBC 0.0        Medications - No data to display     Results for orders placed or performed during the hospital encounter of 03/24/19   Retroperitoneal US    Narrative    RENAL ULTRASOUND   3/24/2019 11:37 AM     HISTORY: LEFT flank and LEFT lower quadrant pain. Currently 16 weeks  pregnant.    COMPARISON: None.    FINDINGS:    Right Kidney: The kidney is normal in size and cortical thickness.  No  renal masses are seen.  There is no hydronephrosis or renal calculus.     Left Kidney: The kidney is normal in size and cortical thickness.  No  renal masses are seen.  There is no hydronephrosis or renal calculus.     The visualized portions of the urinary bladder appear normal.      Impression    IMPRESSION:  Normal renal ultrasound.     JHON HAUSER MD   US OB >14 Weeks Limited wo Fetal Measurement    Narrative    US OB >14 WEEKS LIMITED WO FETAL MEASUREMENT  3/24/2019 11:41 AM    HISTORY: LLQ abdominal pain.    COMPARISON: None.    FINDINGS:     Cardiac activity: 140 bpm. Regular rhythm.  Movement: Unremarkable.  Placenta: Posterior. No evidence for placenta previa.  Adnexa: Unremarkable.   Cervical length: 6.1 cm.   Amniotic fluid: Unremarkable.  Other findings: None.  A complete anatomy scan was not performed.       Impression    IMPRESSION:    1. Single live intrauterine pregnancy with fetal heart rate  of 140  bpm.         JHON HAUSER MD       10:20 Patient assessed.      Assessments & Plan (with Medical Decision Making) records were reviewed.  Labs were obtained.  Renal ultrasound and pelvic ultrasound were obtained.  Patient's white count was 10.2 hemoglobin was 13.6.  There is no left shift.  Basic metabolic panel without acute abnormality.  Urine analysis with 5 WBCs.  Few bacteria culture was sent.  Renal ultrasound was without acute abnormality and pelvic ultrasound revealed no obvious abnormality.  Patient's pain is actually improved.  At this point further imaging studies were discussed with patient and she felt comfortable using symptomatic treatment at this time.  She understands that if symptoms worsen or new symptoms develop she will return for further evaluation and care.  She will take Tylenol for pain.  Patient feels comfortable with this plan at this time.     I have reviewed the nursing notes.    I have reviewed the findings, diagnosis, plan and need for follow up with the patient.          Medication List      There are no discharge medications for this visit.         Final diagnoses:   Flank pain   LLQ abdominal pain   15 weeks gestation of pregnancy       This document serves as a record of the services and decisions personally performed and made by Pillo Acosta MD. It was created on HIS/HER behalf by Linda Dawson, a trained medical scribe. The creation of this document is based the provider's statements to the medical scribe.  Linda Dawson 10:47 AM 3/24/2019    Provider:   The information in this document, created by the medical scribe for me, accurately reflects the services I personally performed and the decisions made by me. I have reviewed and approved this document for accuracy prior to leaving the patient care area.  Pillo Acosta MD 10:47 AM 3/24/2019    3/24/2019   [facility] EMERGENCY DEPARTMENT     Pillo Acosta MD  03/25/19 1403

## 2019-03-24 NOTE — ED AVS SNAPSHOT
[facility] Emergency Department  [address] [address]  Phone:  [phone]  Fax:  [phone]                                    Heron Gee   MRN: [id number]    Department:  [facility] Emergency Department   Date of Visit:  3/24/2019           After Visit Summary Signature Page    I have received my discharge instructions, and my questions have been answered. I have discussed any challenges I see with this plan with the nurse or doctor.    ..........................................................................................................................................  Patient/Patient Representative Signature      ..........................................................................................................................................  Patient Representative Print Name and Relationship to Patient    ..................................................               ................................................  Date                                   Time    ..........................................................................................................................................  Reviewed by Signature/Title    ...................................................              ..............................................  Date                                               Time          22EPIC Rev 08/18

## 2019-03-24 NOTE — ED NOTES
Left flank pain into left abd that started at 0700-went away and returned and is gone at this time-pt is 15 weeks pregnant

## 2019-03-24 NOTE — DISCHARGE INSTRUCTIONS
Return if symptoms worsen or new symptoms develop.  Follow-up with primary care physician this week for recheck.  Drink plenty of fluids take Tylenol for pain.  If worsening pain fever chills nausea vomiting or other symptoms present please return for further evaluation and care.

## 2019-03-24 NOTE — TELEPHONE ENCOUNTER
"Patient states she is 15 weeks pregnant.  EDC 9/9/19.   Patient states she's had significant low back and Left sided pain x past 2 hours.   States initially pain felt like \"cramping.\"   Currently has been up moving with slight improvement in pain.    Currently describes pain as \"moderate and constant.\"  Denies vaginal bleeding.  Denies vaginal fluid leakage.  Voiding as usual. No pain on urination.  States last bowel movement yesterday.  Temperature \"96.3\" now. States had an episode of feeling extremely hot and sweating. Now feels chills.  Caller sounds anxious and in pain during FNA triage.     Protocol-  Pregnancy- Back Pain- Adult   Care advice reviewed.   Disposition-  See Physician within 4 hours   Caller states understanding of the recommended disposition.   States she plans to go to [facility] ED since [facility] does not open until 12:00pm today. Has transportation.   Advised to call back if further questions or concerns.     BAO Pham RN  [facility]     Reason for Disposition    [1] Flank pain (i.e., in side, below ribs and above hip) AND [2] fever > 100.5 F (38.1 C)    Additional Information    Negative: Shock suspected (e.g., cold/pale/clammy skin, too weak to stand, low BP, rapid pulse)    Negative: SEVERE abdominal pain    Negative: Sounds like a life-threatening emergency to the triager    Negative: [1] Unable to urinate (or only a few drops) > 4 hours AND     [2] bladder feels very full (e.g., palpable bladder or strong urge to urinate)    Negative: Numbness in groin or rectal area (i.e., loss of sensation)    Negative: Leakage of fluid from vagina    Negative: [1] Pregnant 23 or more weeks AND [2] baby is moving less today (e.g., kick count < 5 in 1 hour or < 10 in 2 hours)    Negative: Weakness of a leg or foot (e.g., unable to bear weight, dragging foot)    Negative: Unable to walk    Negative: Patient sounds very sick or weak to the triager    Negative: [1] SEVERE back pain " (e.g., excruciating, unable to do any normal activities) AND [2] not improved 2 hours after pain medicine    Negative: [1] Pain radiates into the thigh or further down the leg AND [2] both legs    Protocols used: PREGNANCY - BACK PAIN-ADULT-AH

## 2019-03-25 LAB
BACTERIA SPEC CULT: NORMAL
BACTERIA SPEC CULT: NORMAL
Lab: NORMAL
SPECIMEN SOURCE: NORMAL

## 2019-04-15 ENCOUNTER — RESULT FOLLOW UP (OUTPATIENT)
Dept: OBGYN | Facility: CLINIC | Age: 34
End: 2019-04-15

## 2019-04-15 ENCOUNTER — PRENATAL OFFICE VISIT (OUTPATIENT)
Dept: OBGYN | Facility: CLINIC | Age: 34
End: 2019-04-15
Payer: COMMERCIAL

## 2019-04-15 VITALS
DIASTOLIC BLOOD PRESSURE: 72 MMHG | HEART RATE: 120 BPM | WEIGHT: 206.8 LBS | OXYGEN SATURATION: 96 % | SYSTOLIC BLOOD PRESSURE: 105 MMHG | BODY MASS INDEX: 40.39 KG/M2

## 2019-04-15 DIAGNOSIS — R87.810 ASCUS WITH POSITIVE HIGH RISK HPV CERVICAL: ICD-10-CM

## 2019-04-15 DIAGNOSIS — O34.219 PREVIOUS CESAREAN DELIVERY, ANTEPARTUM CONDITION OR COMPLICATION: Primary | ICD-10-CM

## 2019-04-15 DIAGNOSIS — R87.610 ASCUS WITH POSITIVE HIGH RISK HPV CERVICAL: ICD-10-CM

## 2019-04-15 PROCEDURE — G0145 SCR C/V CYTO,THINLAYER,RESCR: HCPCS | Performed by: OBSTETRICS & GYNECOLOGY

## 2019-04-15 PROCEDURE — 87491 CHLMYD TRACH DNA AMP PROBE: CPT | Performed by: OBSTETRICS & GYNECOLOGY

## 2019-04-15 PROCEDURE — 87591 N.GONORRHOEAE DNA AMP PROB: CPT | Performed by: OBSTETRICS & GYNECOLOGY

## 2019-04-15 PROCEDURE — 99207 ZZC PRENATAL VISIT: CPT | Performed by: OBSTETRICS & GYNECOLOGY

## 2019-04-15 PROCEDURE — G0124 SCREEN C/V THIN LAYER BY MD: HCPCS | Performed by: OBSTETRICS & GYNECOLOGY

## 2019-04-15 PROCEDURE — 87624 HPV HI-RISK TYP POOLED RSLT: CPT | Performed by: OBSTETRICS & GYNECOLOGY

## 2019-04-15 NOTE — PROGRESS NOTES
19w0d Eating well.  Was evaluated at in ED 3 4 weeks ago for abdominal pains. All resolved. Normal labs and U/S/ Pap and cultures today..  20wk US this week. return to clinic in 4 weeks.    ICD-10-CM    1. Previous  delivery, antepartum condition or complication O34.219 Neisseria gonorrhoeae PCR     Chlamydia trachomatis PCR     Pap imaged thin layer screen with HPV - recommended age 30 - 65 years (select HPV order below)     HPV High Risk Types DNA Cervical     CEPHAS AGBEH, MD.

## 2019-04-15 NOTE — PATIENT INSTRUCTIONS
If you have any questions regarding your visit, Please contact your care team.    [employer] [facility]: [phone]      [facility] CLINIC HOURS TELEPHONE NUMBER   Cephas Agbeh, M.D.    Jaja Worrell -         Monday-[facility]  8:00 am - 5 pm  Tuesday- [facility]  8:00am- 5 pm  Wednesday- Off  Thursday- [facility]  8:00 am- 5 pm  Friday-[employer]  8:00 am 5 pm [facility]  [address]  [place], [place] [address] [phone] ask for [facility]    Imaging [phone]    [facility]  [address]  [place] [address] [phone]  Imaging [phone]     Urgent Care locations:    [facility] Saturday and Sunday   9 am - 5 pm    Monday-Friday   12 pm - 8 pm  Saturday and Sunday   9 am - 5 pm   [phone]       If you need a medication refill, please contact your pharmacy. Please allow 3 business days for your refill to be completed.  As always, Thank you for trusting us with your healthcare needs!

## 2019-04-16 LAB
C TRACH DNA SPEC QL NAA+PROBE: NEGATIVE
N GONORRHOEA DNA SPEC QL NAA+PROBE: NEGATIVE
SPECIMEN SOURCE: NORMAL
SPECIMEN SOURCE: NORMAL

## 2019-04-18 LAB
COPATH REPORT: ABNORMAL
PAP: ABNORMAL

## 2019-04-19 ENCOUNTER — ANCILLARY PROCEDURE (OUTPATIENT)
Dept: ULTRASOUND IMAGING | Facility: CLINIC | Age: 34
End: 2019-04-19
Attending: OBSTETRICS & GYNECOLOGY
Payer: COMMERCIAL

## 2019-04-19 DIAGNOSIS — O34.219 PREVIOUS CESAREAN DELIVERY, ANTEPARTUM CONDITION OR COMPLICATION: ICD-10-CM

## 2019-04-19 LAB
FINAL DIAGNOSIS: ABNORMAL
HPV HR 12 DNA CVX QL NAA+PROBE: POSITIVE
HPV16 DNA SPEC QL NAA+PROBE: NEGATIVE
HPV18 DNA SPEC QL NAA+PROBE: NEGATIVE
SPECIMEN DESCRIPTION: ABNORMAL
SPECIMEN SOURCE CVX/VAG CYTO: ABNORMAL

## 2019-04-19 PROCEDURE — 76805 OB US >/= 14 WKS SNGL FETUS: CPT

## 2019-04-22 NOTE — PROGRESS NOTES
4/17/15: Pap - ASCUS. Repeat pap and HPV at 6 wk pp exam.   1/28/16: Pap - NIL. Repeat pap in 3 years.   4/15/19 ASCUS Pap, + HR HPV (Neg 16/18). Plan colp after 20 weeks. ABIODUN 9/9/19 4/23/19 Message left to return call. Pt notified.   6/27/19 [place] visually normal; no biopsies taken. Plan cotest at postpartum visit.   10/28/19 NIL pap, + HR HPV (not 16 or 18). Plan colp. (MRA)  11/5/19 Pt notified. (jordan)  12/2/19 [place] ECC - Negative. Plan cotest in 1 year. (jordan)  12/5/19 Result released to [employer]. Pt viewed result on [employer] (jordan)

## 2019-05-16 ENCOUNTER — PRENATAL OFFICE VISIT (OUTPATIENT)
Dept: OBGYN | Facility: CLINIC | Age: 34
End: 2019-05-16
Payer: COMMERCIAL

## 2019-05-16 VITALS
DIASTOLIC BLOOD PRESSURE: 69 MMHG | OXYGEN SATURATION: 96 % | HEART RATE: 109 BPM | SYSTOLIC BLOOD PRESSURE: 100 MMHG | BODY MASS INDEX: 40.04 KG/M2 | WEIGHT: 205 LBS

## 2019-05-16 DIAGNOSIS — O34.219 PREVIOUS CESAREAN DELIVERY, ANTEPARTUM CONDITION OR COMPLICATION: Primary | ICD-10-CM

## 2019-05-16 PROCEDURE — 99207 ZZC PRENATAL VISIT: CPT | Performed by: OBSTETRICS & GYNECOLOGY

## 2019-05-16 NOTE — PROGRESS NOTES
23w3d  Doing well without issues/concerns.  Routine anticipatory guidance.   F/UUS ordered Plan for  GCT, Hgb.  At next visit. Discussed colposcopy vs postpartum pap./ ACOG pamphlet given. return to clinic in 4 weeks.      ICD-10-CM    1. Previous  delivery, antepartum condition or complication O34.219 US OB >14 Weeks Follow Up     Hemoglobin     ABO and Rh     Glucose tolerance gest screen 1 hour     CEPHAS AGBEH, MD.

## 2019-05-16 NOTE — PATIENT INSTRUCTIONS
If you have any questions regarding your visit, Please contact your care team.  [employer] [facility]: [phone]  [facility] CLINIC HOURS TELEPHONE NUMBER   Cephas Agbeh, M.D. Lisa -       Jonathon Montaño         Monday-[place]    8:00a.m-4:45 p.m    Tuesday--[place]     8:00a.m-4:45 p.m.    Thursday-[place]    8:00a.m-4:45 p.m.    Friday-[place]    8:00a.m-4:45 p.m    [facility]   [address] [place] [address]   [phone]-Ask for [facility]   Fax [phone]   [phone]     [facility] Labor and Delivery   [address] [place] [address] [place] [address]   [phone]    [facility]  [address] [address]  [phone]  [phone]   Urgent Care locations:    [facility] Monday-Friday  5 pm - 9 pm  Saturday and Sunday   9 am - 5 pm   Monday-Friday   5 pm - 9 pm  Saturday and Sunday  9 am - 5 pm    [phone]   If you need a medication refill, please contact your pharmacy. Please allow 3 business days for your refill to be completed.  As always, Thank you for trusting us with your healthcare needs!

## 2019-05-20 ENCOUNTER — ANCILLARY PROCEDURE (OUTPATIENT)
Dept: ULTRASOUND IMAGING | Facility: CLINIC | Age: 34
End: 2019-05-20
Attending: OBSTETRICS & GYNECOLOGY
Payer: COMMERCIAL

## 2019-05-20 DIAGNOSIS — O34.219 PREVIOUS CESAREAN DELIVERY, ANTEPARTUM CONDITION OR COMPLICATION: ICD-10-CM

## 2019-05-20 PROCEDURE — 76816 OB US FOLLOW-UP PER FETUS: CPT

## 2019-06-14 ENCOUNTER — PRENATAL OFFICE VISIT (OUTPATIENT)
Dept: OBGYN | Facility: CLINIC | Age: 34
End: 2019-06-14
Payer: COMMERCIAL

## 2019-06-14 VITALS
WEIGHT: 207.6 LBS | OXYGEN SATURATION: 98 % | DIASTOLIC BLOOD PRESSURE: 75 MMHG | BODY MASS INDEX: 40.54 KG/M2 | HEART RATE: 112 BPM | SYSTOLIC BLOOD PRESSURE: 116 MMHG

## 2019-06-14 DIAGNOSIS — O34.219 PREVIOUS CESAREAN DELIVERY, ANTEPARTUM CONDITION OR COMPLICATION: Primary | ICD-10-CM

## 2019-06-14 LAB
ABO + RH BLD: NORMAL
ABO + RH BLD: NORMAL
GLUCOSE 1H P 50 G GLC PO SERPL-MCNC: 164 MG/DL (ref 60–129)
HBA1C MFR BLD: 4.9 % (ref 0–5.6)
HGB BLD-MCNC: 12.7 G/DL (ref 11.7–15.7)
SPECIMEN EXP DATE BLD: NORMAL

## 2019-06-14 PROCEDURE — 86901 BLOOD TYPING SEROLOGIC RH(D): CPT | Performed by: OBSTETRICS & GYNECOLOGY

## 2019-06-14 PROCEDURE — 36415 COLL VENOUS BLD VENIPUNCTURE: CPT | Performed by: OBSTETRICS & GYNECOLOGY

## 2019-06-14 PROCEDURE — 99207 ZZC PRENATAL VISIT: CPT | Performed by: OBSTETRICS & GYNECOLOGY

## 2019-06-14 PROCEDURE — 85018 HEMOGLOBIN: CPT | Performed by: OBSTETRICS & GYNECOLOGY

## 2019-06-14 PROCEDURE — 83036 HEMOGLOBIN GLYCOSYLATED A1C: CPT | Performed by: OBSTETRICS & GYNECOLOGY

## 2019-06-14 PROCEDURE — 86900 BLOOD TYPING SEROLOGIC ABO: CPT | Performed by: OBSTETRICS & GYNECOLOGY

## 2019-06-14 PROCEDURE — 82950 GLUCOSE TEST: CPT | Performed by: OBSTETRICS & GYNECOLOGY

## 2019-06-14 NOTE — PATIENT INSTRUCTIONS
If you have any questions regarding your visit, Please contact your care team.  [employer] [facility]: [phone]  [facility] CLINIC HOURS TELEPHONE NUMBER   Cephas Agbeh, M.D. Lisa -       Jonathon Montaño         Monday-[place]    8:00a.m-4:45 p.m    Tuesday--[place]     8:00a.m-4:45 p.m.    Thursday-[place]    8:00a.m-4:45 p.m.    Friday-[place]    8:00a.m-4:45 p.m    [facility]   [address] [place] [address]   [phone]-Ask for [facility]   Fax [phone]   [id number]-[phone]     [facility] Labor and Delivery   [address] [place] [address] [place] [address]   [phone]    [facility]  [address] [address]  [phone]  [phone]   Urgent Care locations:    [facility] Monday-Friday  5 pm - 9 pm  Saturday and Sunday   9 am - 5 pm   Monday-Friday   5 pm - 9 pm  Saturday and Sunday  9 am - 5 pm    [phone]   If you need a medication refill, please contact your pharmacy. Please allow 3 business days for your refill to be completed.  As always, Thank you for trusting us with your healthcare needs!

## 2019-06-14 NOTE — PROGRESS NOTES
27w4d  Doing well without issues/concerns. Repeat C/S tentatively for 9/3/19 at 0730.  to schedule. Routine anticipatory guidance.  Repeat U/S ordered..  Glucola given. Plan for  GCT, Hgb.   return to clinic in 4 weeks.    ICD-10-CM    1. Previous  delivery, antepartum condition or complication O34.219 US OB >14 Weeks Follow Up     Glucose tolerance gest screen 1 hour     ABO and Rh     Hemoglobin     Lisa-Operative Worksheet     CEPHAS AGBEH, MD.

## 2019-06-17 ENCOUNTER — TELEPHONE (OUTPATIENT)
Dept: OBGYN | Facility: CLINIC | Age: 34
End: 2019-06-17

## 2019-06-17 NOTE — TELEPHONE ENCOUNTER
Surgery Scheduled.    Date of Surgery 9/3/19Time of Surgery 7:30am  Procedure:    Hospital/Surgical Facility: [facility]  Surgeon: Dr. Agbeh  Type of Anesthesia Anticipated: Spinal  Pre-op: next OB with Dr. Agbeh at   Pre-certification 19  Consent signed: NA  Hospital Stay yes    Surgery Pre-Certification    Medical Record Number: [id number]  Heron Gee  YOB: 1985   Phone: [phone] (home)   Primary Provider: Agbeh, Cephas Mawuena    Reason for Admit:  PREVIOUS C/S    Surgeon: C. Agbeh, MD  Surgical Procedure:   ICD-9 Coded: O34.219  Date of Surgery: 9/3/19  Consent signed? N/A    Date signed: na  Hospital: [facility]  Inpatient- Length of stay:  3 days.    Requestor:  Tanja Jain     Location:  [facility]         mailed surgery packet mailed to patient's home address.  Patient instructed NPO 12 hours prior to surgery, arrive 1 3/4 hours prior to surgery, must not  have a .  Patient understood and agrees to the plan.     Tanja Jain  Women's Health

## 2019-06-17 NOTE — TELEPHONE ENCOUNTER
Associated Diagnoses     Previous  delivery, antepartum condition or complication  - Primary       Order Questions     Question Answer Comment   Procedure name(s) - multi select REPEAT C/S    Is this a multi surgeon case? No    Laterality N/A    Reason for procedure PREVIOUS C/S    Location of Case: [facility]    Surgeon Procedure Time (incision to closure) in minutes (per procedure as applicable) 90    Note:  Surgical Case Time Needed (in minutes)   Date of Surgery (Requested): 9/3/2019    Time of Surgery (Requested): 7:30 AM    Patient Class (for admit prior to surgery, specify number of days in comments): Inpatient    Length of Stay: 3 days    Anesthesia Spinal    Vendor Needed? No

## 2019-06-19 NOTE — TELEPHONE ENCOUNTER
Checked [employer] website CPT code: 49103 no auth needed online for this patient. [email or web address]=&priorAuthRequired=&[email or web address]

## 2019-06-20 ENCOUNTER — ANCILLARY PROCEDURE (OUTPATIENT)
Dept: ULTRASOUND IMAGING | Facility: CLINIC | Age: 34
End: 2019-06-20
Attending: OBSTETRICS & GYNECOLOGY
Payer: COMMERCIAL

## 2019-06-20 DIAGNOSIS — O34.219 PREVIOUS CESAREAN DELIVERY, ANTEPARTUM CONDITION OR COMPLICATION: ICD-10-CM

## 2019-06-20 PROCEDURE — 76816 OB US FOLLOW-UP PER FETUS: CPT

## 2019-06-24 DIAGNOSIS — O34.219 PREVIOUS CESAREAN DELIVERY, ANTEPARTUM CONDITION OR COMPLICATION: ICD-10-CM

## 2019-06-24 LAB
GLUCOSE 1H P 100 G GLC PO SERPL-MCNC: 173 MG/DL (ref 60–179)
GLUCOSE 2H P 100 G GLC PO SERPL-MCNC: 174 MG/DL (ref 60–154)
GLUCOSE 3H P 100 G GLC PO SERPL-MCNC: 137 MG/DL (ref 60–139)
GLUCOSE P FAST SERPL-MCNC: 84 MG/DL (ref 60–94)

## 2019-06-24 PROCEDURE — 82952 GTT-ADDED SAMPLES: CPT | Performed by: OBSTETRICS & GYNECOLOGY

## 2019-06-24 PROCEDURE — 82951 GLUCOSE TOLERANCE TEST (GTT): CPT | Performed by: OBSTETRICS & GYNECOLOGY

## 2019-06-24 PROCEDURE — 36415 COLL VENOUS BLD VENIPUNCTURE: CPT | Performed by: OBSTETRICS & GYNECOLOGY

## 2019-06-27 ENCOUNTER — PRENATAL OFFICE VISIT (OUTPATIENT)
Dept: OBGYN | Facility: CLINIC | Age: 34
End: 2019-06-27
Payer: COMMERCIAL

## 2019-06-27 ENCOUNTER — OFFICE VISIT (OUTPATIENT)
Dept: OBGYN | Facility: CLINIC | Age: 34
End: 2019-06-27
Payer: COMMERCIAL

## 2019-06-27 VITALS
DIASTOLIC BLOOD PRESSURE: 68 MMHG | WEIGHT: 208.3 LBS | OXYGEN SATURATION: 97 % | SYSTOLIC BLOOD PRESSURE: 100 MMHG | HEART RATE: 95 BPM | BODY MASS INDEX: 40.68 KG/M2

## 2019-06-27 VITALS
OXYGEN SATURATION: 97 % | WEIGHT: 208.2 LBS | BODY MASS INDEX: 40.66 KG/M2 | HEART RATE: 95 BPM | DIASTOLIC BLOOD PRESSURE: 68 MMHG | SYSTOLIC BLOOD PRESSURE: 100 MMHG

## 2019-06-27 DIAGNOSIS — R87.810 ASCUS WITH POSITIVE HIGH RISK HPV CERVICAL: ICD-10-CM

## 2019-06-27 DIAGNOSIS — O34.219 PREVIOUS CESAREAN DELIVERY, ANTEPARTUM CONDITION OR COMPLICATION: Primary | ICD-10-CM

## 2019-06-27 DIAGNOSIS — R87.610 ASCUS WITH POSITIVE HIGH RISK HPV CERVICAL: ICD-10-CM

## 2019-06-27 PROCEDURE — 99207 ZZC PRENATAL VISIT: CPT | Performed by: OBSTETRICS & GYNECOLOGY

## 2019-06-27 PROCEDURE — 57452 EXAM OF CERVIX W/SCOPE: CPT | Performed by: OBSTETRICS & GYNECOLOGY

## 2019-06-27 NOTE — PATIENT INSTRUCTIONS
If you have any questions regarding your visit, Please contact your care team.  [employer] [facility]: [phone]  [facility] CLINIC HOURS TELEPHONE NUMBER   Cephas Agbeh, M.D. Lisa -       Jonathon Montaño         Monday-[place]    8:00a.m-4:45 p.m    Tuesday--[place]     8:00a.m-4:45 p.m.    Thursday-[place]    8:00a.m-4:45 p.m.    Friday-[place]    8:00a.m-4:45 p.m    [facility]   [address] [place] [address]   [phone]-Ask for [facility]   Fax [phone]   [id number]-[phone]     [facility] Labor and Delivery   [address] [place] [address] [place] [address]   [phone]    [facility]  [address] [facility] [facility] [address] [address]  [phone]  [phone]   Urgent Care locations:    [facility] Monday-Friday  5 pm - 9 pm  Saturday and Sunday   9 am - 5 pm   Monday-Friday   5 pm - 9 pm  Saturday and Sunday  9 am - 5 pm    [phone]   If you need a medication refill, please contact your pharmacy. Please allow 3 business days for your refill to be completed.  As always, Thank you for trusting us with your healthcare needs!

## 2019-06-27 NOTE — PROGRESS NOTES
29w3d.  Tired.  No HA, visual changes, N/V etc. PNE classes planned/done. Repeat C/S on   9/3/19  @ 0730.RTC 2 wk/prn.    ICD-10-CM    1. Previous  delivery, antepartum condition or complication O34.219      CEPHAS AGBEH, MD.

## 2019-06-27 NOTE — PROGRESS NOTES
Patient Name: Heron Gee              Date: 6/27/2019   YOB: 1985                         Age: 34 year old   Phone: [phone] (home)   ________________________________________________________________________  I have been asked to see Heron   to discuss the pap smear, findings and possible further evaluation. She is about 30 weeks pregnant. The patient's pap smear history is as noted:  Noted:  4/17/2015   Priority:  Medium   Overview Addendum 4/23/2019 11:31 AM by Linda De La Cruz RN   4/17/15: Pap - ASCUS. Repeat pap and HPV at 6 wk pp exam.   1/28/16: Pap - NIL. Repeat pap in 3 years.   4/15/19 ASCUS Pap, + HR HPV (Neg 16/18). Pt pregnant. Plan colp after 20 weeks       I attempted to ensure that the patient was educated regarding the nature of her findings and implications to date.  We reviewed the role of HPV, incidence in the population and the natural history of the infection, and its transmission.  We also reviewed ways to minimize her future risk, the effect of HPV on the cervix and treatment options available, should they be indicated.    The pathophysiology of the cervix, including a discussion of the squamous and columnar cells, metaplasia and dysplasia have been reviewed, drawings, sketches and the pamphlets were reviewed with her.      Patient's last menstrual period was 11/28/2018.    Past Medical History:   Diagnosis Date     ASCUS on Pap smear 4/17/15, 2019    see problem list     Cervical high risk HPV (human papillomavirus) test positive 04/15/2019    see problem list       Past Surgical History:   Procedure Laterality Date     APPENDECTOMY      as a child     deviated septum      teenager        Outpatient Encounter Medications as of 6/27/2019   Medication Sig Dispense Refill     acetaminophen (TYLENOL) 500 MG tablet Take 1,000 mg by mouth once       loratadine (CLARITIN) 10 MG tablet Take 10 mg by mouth every morning        Prenatal Vit-Fe Fumarate-FA (PRENATAL MULTIVITAMIN  W/IRON) 27-0.8 MG tablet Take 1 tablet by mouth every morning        No facility-administered encounter medications on file as of 6/27/2019.         Allergies as of 06/27/2019     (No Known Allergies)       Social History     Socioeconomic History     Marital status: Single     Spouse name: None     Number of children: None     Years of education: None     Highest education level: None   Occupational History     None   Social Needs     Financial resource strain: None     Food insecurity:     Worry: None     Inability: None     Transportation needs:     Medical: None     Non-medical: None   Tobacco Use     Smoking status: Current Every Day Smoker     Packs/day: 0.25     Types: Cigarettes     Smokeless tobacco: Never Used   Substance and Sexual Activity     Alcohol use: No     Alcohol/week: 0.0 oz     Drug use: No     Sexual activity: Yes     Partners: Male   Lifestyle     Physical activity:     Days per week: None     Minutes per session: None     Stress: None   Relationships     Social connections:     Talks on phone: None     Gets together: None     Attends [employer] service: None     Active member of club or organization: None     Attends meetings of clubs or organizations: None     Relationship status: None     Intimate partner violence:     Fear of current or ex partner: None     Emotionally abused: None     Physically abused: None     Forced sexual activity: None   Other Topics Concern     Parent/sibling w/ CABG, MI or angioplasty before 65F 55M? Not Asked   Social History Narrative     None        Family History   Problem Relation Age of Onset     Diabetes Brother      Hypertension No family hx of      Breast Cancer No family hx of      Ovarian Cancer No family hx of      Cerebrovascular Disease No family hx of      Thyroid Disease No family hx of          Review Of Systems  10 point ROS of systems including Constitutional, Eyes, Respiratory, Cardiovascular, Gastroenterology, Genitourinary, Integumentary,  Muscularskeletal, Psychiatric were all negative except for pertinent positives noted in my HPI and in the PMH.      Exam:   /68   Pulse 95   Wt 94.5 kg (208 lb 4.8 oz)   LMP 11/28/2018   SpO2 97%   Breastfeeding? No   BMI 40.68 kg/m    GENERAL:  WNWD female NAD  HEENT: NC/AT, EOMI  Lungs:  Good respiratory effort   SKIN: normal skin turgor  GAIT: Normal  NECK: Symmetrical, no masses noted   VULVA: Normal Genitalia  BUS: Normal  URETHRA:  No hypermobility noted  URETHRAL MEATUS:  No masses noted  VAGINA: Normal mucosa, no discharge  CERVIX: Closed, mobile, no discharge  PERIANAL:  No masses or lesions seen  EXTREMITIES: no clubbing, cyanosis, or edema    Assessment:  ASCUS,Other High Risk HPV.    Plan:  Recommend to Proceed with Colpo  The details of the colposcopic procedure were reviewed, the risks of missed diagnoses, pain, infection, and bleeding.        Procedure:  Procedure for colposcopy and biopsy has been explained to the patient and consent obtained.    Before the procedure, it was ensured that the patient was educated regarding the nature of her findings and implications to date.  We reviewed the role of HPV and the natural history of the infection.  We also reviewed ways to minimize her future risk, the effect of HPV on the cervix and treatment options available, should they be indicated.    The pathophysiology of the cervix, including a discussion of the squamous and columnar cells, metaplasia and dysplasia have been reviewed, drawings, sketches and the pamphlets were reviewed with her.  The details of the colposcopic procedure were reviewed, the risks of missed diagnoses, pain, infection, and bleeding.  Questions seemed to be answered before proceeding and the patient then consented to the procedure.     Speculum placed in vagina and excellent visualization of cervix achieved, cervix swabbed  with acetic acid solution.    No biopsies were taken.    Findings:    Cervix: no visible  lesions  Vaginal inspection: no visible lesions.  Procedure Summary: Patient tolerated procedure well.      Assessment:     ICD-10-CM    1. Previous  delivery, antepartum condition or complication O34.219    2. ASCUS with positive high risk HPV cervical R87.610 COLP CERVIX/UPPER VAGINA W BX CERVIX/ENDOCERV CURETT    R87.810          Plan:    Cotest pap at postpartum exam.    CEPHAS AGBEH, MD.

## 2019-07-15 ENCOUNTER — PRENATAL OFFICE VISIT (OUTPATIENT)
Dept: OBGYN | Facility: CLINIC | Age: 34
End: 2019-07-15
Payer: COMMERCIAL

## 2019-07-15 VITALS
BODY MASS INDEX: 41.21 KG/M2 | WEIGHT: 211 LBS | SYSTOLIC BLOOD PRESSURE: 105 MMHG | DIASTOLIC BLOOD PRESSURE: 68 MMHG | OXYGEN SATURATION: 96 % | HEART RATE: 96 BPM

## 2019-07-15 DIAGNOSIS — Z23 NEED FOR TDAP VACCINATION: Primary | ICD-10-CM

## 2019-07-15 DIAGNOSIS — O34.219 PREVIOUS CESAREAN DELIVERY, ANTEPARTUM CONDITION OR COMPLICATION: ICD-10-CM

## 2019-07-15 PROCEDURE — 90715 TDAP VACCINE 7 YRS/> IM: CPT | Performed by: OBSTETRICS & GYNECOLOGY

## 2019-07-15 PROCEDURE — 90471 IMMUNIZATION ADMIN: CPT | Performed by: OBSTETRICS & GYNECOLOGY

## 2019-07-15 PROCEDURE — 99207 ZZC PRENATAL VISIT: CPT | Performed by: OBSTETRICS & GYNECOLOGY

## 2019-07-15 NOTE — PATIENT INSTRUCTIONS
If you have any questions regarding your visit, Please contact your care team.  [employer]: [phone]  [facility] CLINIC HOURS TELEPHONE NUMBER   Cephas Agbeh, M.D. Lisa -       Jonathon Montaño         Monday-[place]    8:00a.m-4:45 p.m    Tuesday--[place]     8:00a.m-4:45 p.m.    Thursday-Chris    8:00a.m-4:45 p.m.    Friday-Chris    8:00a.m-4:45 p.m    [facility]   [address] [place] [address]   [phone]-Ask for [facility]   Fax [phone]   [id number]-[phone]     [facility] Labor and Delivery   [address] [place] [address] [place] [address]   [phone]    [facility]  [address] [facility] [address]  [phone]  [phone]   Urgent Care locations:    [facility] Monday-Friday  5 pm - 9 pm  Saturday and Sunday   9 am - 5 pm   Monday-Friday   5 pm - 9 pm  Saturday and Sunday  9 am - 5 pm    [phone]   If you need a medication refill, please contact your pharmacy. Please allow 3 business days for your refill to be completed.  As always, Thank you for trusting us with your healthcare needs!

## 2019-07-15 NOTE — PROGRESS NOTES
32w0d.  Tired.  No HA, visual changes, N/V etc. Repeat C/S on 19 at 0930 AM.. RTC 2 wk/prn.    ICD-10-CM    1. Need for Tdap vaccination Z23 TDAP VACCINE (ADACEL)     VACCINE ADMINISTRATION, INITIAL   2. Previous  delivery, antepartum condition or complication O34.219      CEPHAS AGBEH, MD.

## 2019-07-29 ENCOUNTER — PRENATAL OFFICE VISIT (OUTPATIENT)
Dept: OBGYN | Facility: CLINIC | Age: 34
End: 2019-07-29
Payer: COMMERCIAL

## 2019-07-29 VITALS
OXYGEN SATURATION: 96 % | HEART RATE: 94 BPM | DIASTOLIC BLOOD PRESSURE: 70 MMHG | BODY MASS INDEX: 41.29 KG/M2 | SYSTOLIC BLOOD PRESSURE: 108 MMHG | WEIGHT: 211.4 LBS

## 2019-07-29 DIAGNOSIS — O34.219 PREVIOUS CESAREAN DELIVERY, ANTEPARTUM CONDITION OR COMPLICATION: Primary | ICD-10-CM

## 2019-07-29 PROCEDURE — 99207 ZZC PRENATAL VISIT: CPT | Performed by: OBSTETRICS & GYNECOLOGY

## 2019-07-29 NOTE — PATIENT INSTRUCTIONS
If you have any questions regarding your visit, Please contact your care team.  [employer] [facility]: [phone]  [facility] CLINIC HOURS TELEPHONE NUMBER   Cephas Agbeh, M.D. Lisa -       Jonathon Montaño         Monday-[place]    8:00a.m-4:45 p.m    Tuesday--[place]     8:00a.m-4:45 p.m.    Thursday-Chris    8:00a.m-4:45 p.m.    Friday-Chris    8:00a.m-4:45 p.m    [facility]   [address] [place] [address]   [phone]-Ask for [facility]   Fax [phone]   [id number]-[phone]     [facility] Labor and Delivery   [address] [place] [address] [place] [address]   [phone]    [facility]  [address] [facility] [address]  [phone]  [phone]   Urgent Care locations:    [facility] Monday-Friday  5 pm - 9 pm  Saturday and Sunday   9 am - 5 pm   Monday-Friday   5 pm - 9 pm  Saturday and Sunday  9 am - 5 pm    [phone]   If you need a medication refill, please contact your pharmacy. Please allow 3 business days for your refill to be completed.  As always, Thank you for trusting us with your healthcare needs!

## 2019-07-29 NOTE — PROGRESS NOTES
34w0d  Tired.  No HA, visual changes, N/V etc. Answered C/S questions. RTC 2 wk/prn.    ICD-10-CM    1. Previous  delivery, antepartum condition or complication O34.219      CEPHAS AGBEH, MD.

## 2019-08-12 ENCOUNTER — PRENATAL OFFICE VISIT (OUTPATIENT)
Dept: OBGYN | Facility: CLINIC | Age: 34
End: 2019-08-12
Payer: COMMERCIAL

## 2019-08-12 ENCOUNTER — TELEPHONE (OUTPATIENT)
Dept: OBGYN | Facility: CLINIC | Age: 34
End: 2019-08-12

## 2019-08-12 VITALS
SYSTOLIC BLOOD PRESSURE: 97 MMHG | HEART RATE: 94 BPM | BODY MASS INDEX: 41.95 KG/M2 | WEIGHT: 214.8 LBS | DIASTOLIC BLOOD PRESSURE: 65 MMHG

## 2019-08-12 DIAGNOSIS — O34.219 PREVIOUS CESAREAN DELIVERY, ANTEPARTUM CONDITION OR COMPLICATION: Primary | ICD-10-CM

## 2019-08-12 PROCEDURE — 87653 STREP B DNA AMP PROBE: CPT | Performed by: OBSTETRICS & GYNECOLOGY

## 2019-08-12 PROCEDURE — 99207 ZZC PRENATAL VISIT: CPT | Performed by: OBSTETRICS & GYNECOLOGY

## 2019-08-12 NOTE — TELEPHONE ENCOUNTER
Sharona needs the due date type of delivery the start date of maternity eave and how long patient will be out on leave.     Please call to advice  Thank you

## 2019-08-12 NOTE — PATIENT INSTRUCTIONS
If you have any questions regarding your visit, Please contact your care team.  [employer]: [phone]  [facility] CLINIC HOURS TELEPHONE NUMBER   Cephas Agbeh, M.D. Lisa -       Jonathon Montaño         Monday-[place]    8:00a.m-4:45 p.m    Tuesday--[place]     8:00a.m-4:45 p.m.    Thursday-Chris    8:00a.m-4:45 p.m.    Friday-Chris    8:00a.m-4:45 p.m    [facility]   [address] [place] [address]   [phone]-Ask for [facility]   Fax [phone]   [id number]-[phone]     [facility] Labor and Delivery   [address] [place] [address] [place] [address]   [phone]    [facility]  [address] [facility] [address]  [phone]  Apudjjd-[phone]   Urgent Care locations:    [facility] Monday-Friday  5 pm - 9 pm  Saturday and Sunday   9 am - 5 pm   Monday-Friday   5 pm - 9 pm  Saturday and Sunday  9 am - 5 pm    [phone]   If you need a medication refill, please contact your pharmacy. Please allow 3 business days for your refill to be completed.  As always, Thank you for trusting us with your healthcare needs!

## 2019-08-12 NOTE — PROGRESS NOTES
36w0d  No HA, visual changes, N/V etc.  Labor plan and warning s/s discussed. Routine AG. See flowsheet. RTC 1 wk/prn.  GBS done.    ICD-10-CM    1. Previous  delivery, antepartum condition or complication O34.219      CEPHAS AGBEH, MD.  .

## 2019-08-13 LAB
GP B STREP DNA SPEC QL NAA+PROBE: NEGATIVE
SPECIMEN SOURCE: NORMAL

## 2019-08-16 NOTE — TELEPHONE ENCOUNTER
Reason for Call:  Other     Detailed comments: Carol calling about OB information     Phone Number Patient can be reached at: Other phone number:  Her ext is [phone]    Best Time:     Can we leave a detailed message on this number? YES    Call taken on 8/16/2019 at 11:52 AM by Suzie Andrews

## 2019-08-19 ENCOUNTER — PRENATAL OFFICE VISIT (OUTPATIENT)
Dept: OBGYN | Facility: CLINIC | Age: 34
End: 2019-08-19
Payer: COMMERCIAL

## 2019-08-19 VITALS
WEIGHT: 213 LBS | HEART RATE: 90 BPM | BODY MASS INDEX: 41.6 KG/M2 | SYSTOLIC BLOOD PRESSURE: 106 MMHG | DIASTOLIC BLOOD PRESSURE: 69 MMHG

## 2019-08-19 DIAGNOSIS — O34.219 PREVIOUS CESAREAN DELIVERY, ANTEPARTUM CONDITION OR COMPLICATION: Primary | ICD-10-CM

## 2019-08-19 PROCEDURE — 99207 ZZC PRENATAL VISIT: CPT | Performed by: OBSTETRICS & GYNECOLOGY

## 2019-08-19 NOTE — PROGRESS NOTES
37w0d  Tired.  No HA, visual changes, N/V etc.  Labor plan and warning s/s discussed.R/CS on 19 RTC 1 wk/prn.      ICD-10-CM    1. Previous  delivery, antepartum condition or complication O34.219      CEPHAS AGBEH, MD.

## 2019-08-19 NOTE — PATIENT INSTRUCTIONS
If you have any questions regarding your visit, Please contact your care team.  [employer]: [phone]  [facility] CLINIC HOURS TELEPHONE NUMBER   Cephas Agbeh, M.D. Lisa -       Jonathon Montaño         Monday-Chris    8:00a.m-4:45 p.m    Tuesday--[place]     8:00a.m-4:45 p.m.    Thursday-Chris    8:00a.m-4:45 p.m.    Friday-Chris    8:00a.m-4:45 p.m    [facility]   [address] [place] [address]   [phone]-Ask for [facility]   Fax [phone]   [id number]-[phone]     [facility] Labor and Delivery   [address] [place] [address] [place] [address]   [phone]    [facility]  [address] [facility] [address]  [phone]  Dhlfnsd-[phone]   Urgent Care locations:    [facility] Monday-Friday  5 pm - 9 pm  Saturday and Sunday   9 am - 5 pm   Monday-Friday   5 pm - 9 pm  Saturday and Sunday  9 am - 5 pm    [phone]   If you need a medication refill, please contact your pharmacy. Please allow 3 business days for your refill to be completed.  As always, Thank you for trusting us with your healthcare needs!    
sudden onset

## 2019-08-22 NOTE — TELEPHONE ENCOUNTER
[employer] called and I gave all needed information for FMLA for patient    Tanja Jain  Women's Health

## 2019-08-22 NOTE — TELEPHONE ENCOUNTER
Called out and attempt to reach anyone in their office, still no response back    Tanja Jain  Women's Health

## 2019-08-26 ENCOUNTER — PRENATAL OFFICE VISIT (OUTPATIENT)
Dept: OBGYN | Facility: CLINIC | Age: 34
End: 2019-08-26
Payer: COMMERCIAL

## 2019-08-26 VITALS
SYSTOLIC BLOOD PRESSURE: 102 MMHG | BODY MASS INDEX: 41.99 KG/M2 | HEART RATE: 85 BPM | WEIGHT: 215 LBS | DIASTOLIC BLOOD PRESSURE: 66 MMHG

## 2019-08-26 DIAGNOSIS — O34.219 PREVIOUS CESAREAN DELIVERY, ANTEPARTUM CONDITION OR COMPLICATION: Primary | ICD-10-CM

## 2019-08-26 PROCEDURE — 99207 ZZC PRENATAL VISIT: CPT | Performed by: OBSTETRICS & GYNECOLOGY

## 2019-08-26 NOTE — PROGRESS NOTES
38w0d  No HA, visual changes, N/V etc.  Labor plan and warning s/s discussed.  Repeat C/S in 1 week.    ICD-10-CM    1. Previous  delivery, antepartum condition or complication O34.219      CEPHAS AGBEH, MD.

## 2019-08-26 NOTE — PATIENT INSTRUCTIONS
If you have any questions regarding your visit, Please contact your care team.  [email or web address]: [phone]  [facility] CLINIC HOURS TELEPHONE NUMBER   Cephas Agbeh, M.D. Lisa -       Jonathon Montaño         Monday-Chris    8:00a.m-4:45 p.m    Tuesday--[place]     8:00a.m-4:45 p.m.    Thursday-Chris    8:00a.m-4:45 p.m.    Friday-Chris    8:00a.m-4:45 p.m    [facility]   [address] [place] [address]   [phone]-Ask for [facility]   Fax [phone]   Hxgekic-[phone]     [facility] Labor and Delivery   [address] [place] [address] [place] [address]   [phone]    [facility]  [address] [facility] [address]  [phone]  [phone]-[phone]   Urgent Care locations:    [facility] Monday-Friday  5 pm - 9 pm  Saturday and Sunday   9 am - 5 pm   Monday-Friday   5 pm - 9 pm  Saturday and Sunday  9 am - 5 pm    [phone]   If you need a medication refill, please contact your pharmacy. Please allow 3 business days for your refill to be completed.  As always, Thank you for trusting us with your healthcare needs!

## 2019-08-27 ENCOUNTER — TELEPHONE (OUTPATIENT)
Dept: OBGYN | Facility: CLINIC | Age: 34
End: 2019-08-27

## 2019-08-27 NOTE — TELEPHONE ENCOUNTER
Received FMLA paper work for [employer], they have been filled out, and waiting to be signed by provider and will be faxed after provider has signed them.  Shalonda Miles, CMA

## 2019-08-29 ENCOUNTER — TELEPHONE (OUTPATIENT)
Dept: OBGYN | Facility: CLINIC | Age: 34
End: 2019-08-29

## 2019-10-14 ENCOUNTER — IMMUNIZATION (OUTPATIENT)
Dept: NURSING | Facility: CLINIC | Age: 34
End: 2019-10-14
Payer: COMMERCIAL

## 2019-10-14 DIAGNOSIS — Z23 ENCOUNTER FOR IMMUNIZATION: Primary | ICD-10-CM

## 2019-10-14 PROCEDURE — 90686 IIV4 VACC NO PRSV 0.5 ML IM: CPT

## 2019-10-14 PROCEDURE — 90471 IMMUNIZATION ADMIN: CPT

## 2019-10-14 PROCEDURE — 99207 ZZC NO CHARGE NURSE ONLY: CPT

## 2019-10-28 ENCOUNTER — PRENATAL OFFICE VISIT (OUTPATIENT)
Dept: OBGYN | Facility: CLINIC | Age: 34
End: 2019-10-28
Payer: COMMERCIAL

## 2019-10-28 VITALS
WEIGHT: 192.2 LBS | DIASTOLIC BLOOD PRESSURE: 82 MMHG | BODY MASS INDEX: 37.54 KG/M2 | HEART RATE: 104 BPM | SYSTOLIC BLOOD PRESSURE: 127 MMHG

## 2019-10-28 DIAGNOSIS — R87.610 ASCUS WITH POSITIVE HIGH RISK HPV CERVICAL: ICD-10-CM

## 2019-10-28 DIAGNOSIS — R87.810 ASCUS WITH POSITIVE HIGH RISK HPV CERVICAL: ICD-10-CM

## 2019-10-28 DIAGNOSIS — Z30.015 ENCOUNTER FOR INITIAL PRESCRIPTION OF VAGINAL RING HORMONAL CONTRACEPTIVE: ICD-10-CM

## 2019-10-28 PROCEDURE — 99207 ZZC POST PARTUM EXAM: CPT | Performed by: OBSTETRICS & GYNECOLOGY

## 2019-10-28 PROCEDURE — 88175 CYTOPATH C/V AUTO FLUID REDO: CPT | Performed by: OBSTETRICS & GYNECOLOGY

## 2019-10-28 PROCEDURE — 87624 HPV HI-RISK TYP POOLED RSLT: CPT | Performed by: OBSTETRICS & GYNECOLOGY

## 2019-10-28 RX ORDER — ETONOGESTREL AND ETHINYL ESTRADIOL VAGINAL RING .015; .12 MG/D; MG/D
1 RING VAGINAL
Qty: 9 EACH | Refills: 3 | Status: SHIPPED | OUTPATIENT
Start: 2019-10-28 | End: 2021-01-29

## 2019-10-28 RX ORDER — FEXOFENADINE HCL 180 MG/1
180 TABLET ORAL DAILY
COMMUNITY

## 2019-10-28 ASSESSMENT — ANXIETY QUESTIONNAIRES
IF YOU CHECKED OFF ANY PROBLEMS ON THIS QUESTIONNAIRE, HOW DIFFICULT HAVE THESE PROBLEMS MADE IT FOR YOU TO DO YOUR WORK, TAKE CARE OF THINGS AT HOME, OR GET ALONG WITH OTHER PEOPLE: NOT DIFFICULT AT ALL
GAD7 TOTAL SCORE: 2
6. BECOMING EASILY ANNOYED OR IRRITABLE: NOT AT ALL
3. WORRYING TOO MUCH ABOUT DIFFERENT THINGS: SEVERAL DAYS
1. FEELING NERVOUS, ANXIOUS, OR ON EDGE: SEVERAL DAYS
2. NOT BEING ABLE TO STOP OR CONTROL WORRYING: NOT AT ALL
7. FEELING AFRAID AS IF SOMETHING AWFUL MIGHT HAPPEN: NOT AT ALL
5. BEING SO RESTLESS THAT IT IS HARD TO SIT STILL: NOT AT ALL

## 2019-10-28 ASSESSMENT — PATIENT HEALTH QUESTIONNAIRE - PHQ9
SUM OF ALL RESPONSES TO PHQ QUESTIONS 1-9: 2
5. POOR APPETITE OR OVEREATING: NOT AT ALL

## 2019-10-28 NOTE — PROGRESS NOTES
Heron is here for a 6-week postpartum checkup.    She had a Repeat  of a liveborn baby boy, weight 7 pounds 14 oz.  The delivery was uncomplicated.  Since delivery, she has not been breast feeding.  She has had a normal menses.  She has t had intercourse.  Patient screened for postpartum depression and complaints are NEGATIVE. Screening has also been completed for intimate partner violence.     Her last pap was  and was Normal    EXAM: /82   Pulse 104   Wt 87.2 kg (192 lb 3.2 oz)   LMP 2018   Breastfeeding? No   BMI 37.54 kg/m      HEENT: grossly normal.  NECK: no lymphadenopathy or thyromegaly.  ABDOMEN: soft, non tender, good bowel sounds, without masses, rebound, guarding or tenderness.  INC: well healed   EXTREMITIES: Warm to touch, no ankle edema or calf tenderness.    PELVIC:    External genitalia: normal without lesion,  perineum well healed   Vagina: normal mucosa and rugae, normal discharge.  Cervix: normal without lesion.  Uterus: small, mobile, nontender.  Adnexa: No masses, No tenderness  Rectal: deferred, external hemorrhoids absent    A/P  Routine Postpartum    ICD-10-CM    1. Routine postpartum follow-up Z39.2 Pap imaged thin layer screen with HPV - recommended age 30 - 65 years (select HPV order below)     HPV High Risk Types DNA Cervical     etonogestrel-ethinyl estradiol (NUVARING) 0.12-0.015 MG/24HR vaginal ring   2. ASCUS with positive high risk HPV cervical R87.610 Pap imaged thin layer screen with HPV - recommended age 30 - 65 years (select HPV order below)    R87.810 HPV High Risk Types DNA Cervical   3. Encounter for initial prescription of vaginal ring hormonal contraceptive Z30.015 etonogestrel-ethinyl estradiol (NUVARING) 0.12-0.015 MG/24HR vaginal ring       1. Contraception: NUVARING  2. Annual due in  every 12 months    CEPHAS AGBEH, MD.

## 2019-10-28 NOTE — PATIENT INSTRUCTIONS
If you have any questions regarding your visit, Please contact your care team.  [employer] [facility]: [phone]  [facility] CLINIC HOURS TELEPHONE NUMBER   Cephas Agbeh, M.D. Lisa -       Jonathon Montaño         Monday-[place]    8:00a.m-4:45 p.m    Tuesday--[place]     8:00a.m-4:45 p.m.    Thursday-Chris    8:00a.m-4:45 p.m.    Friday-Chris    8:00a.m-4:45 p.m    [facility]   [address] [place] [address]   [phone]-Ask for [facility]   Fax [phone]   Mwnlqnh-[phone]     [facility] Labor and Delivery   [address] [place] [address] [place] [address]   [phone]    [facility]  [address] [facility] [address]  [phone]  [phone]   Urgent Care locations:    [facility] Monday-Friday  5 pm - 9 pm  Saturday and Sunday   9 am - 5 pm   Monday-Friday   5 pm - 9 pm  Saturday and Sunday  9 am - 5 pm    [phone]   If you need a medication refill, please contact your pharmacy. Please allow 3 business days for your refill to be completed.  As always, Thank you for trusting us with your healthcare needs!

## 2019-10-29 ASSESSMENT — ANXIETY QUESTIONNAIRES: GAD7 TOTAL SCORE: 2

## 2019-10-31 LAB
COPATH REPORT: NORMAL
PAP: NORMAL

## 2019-12-02 ENCOUNTER — OFFICE VISIT (OUTPATIENT)
Dept: OBGYN | Facility: CLINIC | Age: 34
End: 2019-12-02
Payer: COMMERCIAL

## 2019-12-02 VITALS
WEIGHT: 190.2 LBS | DIASTOLIC BLOOD PRESSURE: 78 MMHG | SYSTOLIC BLOOD PRESSURE: 120 MMHG | HEART RATE: 91 BPM | BODY MASS INDEX: 37.15 KG/M2

## 2019-12-02 DIAGNOSIS — R87.810 CERVICAL HIGH RISK HPV (HUMAN PAPILLOMAVIRUS) TEST POSITIVE: Primary | ICD-10-CM

## 2019-12-02 PROCEDURE — 57456 ENDOCERV CURETTAGE W/SCOPE: CPT | Performed by: OBSTETRICS & GYNECOLOGY

## 2019-12-02 PROCEDURE — 88305 TISSUE EXAM BY PATHOLOGIST: CPT | Performed by: OBSTETRICS & GYNECOLOGY

## 2019-12-02 NOTE — PROGRESS NOTES
Patient Name: Heron Gee              Date: 2019   YOB: 1985                         Age: 34 year old   Phone: [phone] (home)   ________________________________________________________________________  I have been asked to see Heron   to discuss the pap smear, findings and possible further evaluation.  The patient's pap smear history is as noted:  Problem Detail     Noted:  2015   Priority:  Medium   Overview Addendum 2019  2:09 PM by Bereket Livingston RN   4/17/15: Pap - ASCUS. Repeat pap and HPV at 6 wk pp exam.   16: Pap - NIL. Repeat pap in 3 years.   4/15/19 ASCUS Pap, + HR HPV (Neg 16/18). Pt pregnant. Plan colp after 20 weeks  19 [place] visually normal; no biopsies taken. Plan cotest at postpartum visit.   10/28/19 NIL pap, + HR HPV (not 16 or 18). Plan colp.        I attempted to ensure that the patient was educated regarding the nature of her findings and implications to date.  We reviewed the role of HPV, incidence in the population and the natural history of the infection, and its transmission.  We also reviewed ways to minimize her future risk, the effect of HPV on the cervix and treatment options available, should they be indicated.    The pathophysiology of the cervix, including a discussion of the squamous and columnar cells, metaplasia and dysplasia have been reviewed, drawings, sketches and the pamphlets were reviewed with her.      Patient's last menstrual period was 2019 (approximate).  Current Birth Control Method: Nuva ring    Past Medical History:   Diagnosis Date     ASCUS on Pap smear 4/17/15, 2019    see problem list     Cervical high risk HPV (human papillomavirus) test positive 04/15/2019, 10/28/19    see problem list       Past Surgical History:   Procedure Laterality Date     APPENDECTOMY      as a child     deviated septum      teenager     GYN SURGERY  2019            Outpatient Encounter Medications as of 2019    Medication Sig Dispense Refill     acetaminophen (TYLENOL) 500 MG tablet Take 1,000 mg by mouth once       etonogestrel-ethinyl estradiol (NUVARING) 0.12-0.015 MG/24HR vaginal ring Place 1 each vaginally every 28 days 9 each 3     fexofenadine (ALLEGRA) 180 MG tablet Take 180 mg by mouth daily       Prenatal Vit-Fe Fumarate-FA (PRENATAL MULTIVITAMIN W/IRON) 27-0.8 MG tablet Take 1 tablet by mouth every morning        No facility-administered encounter medications on file as of 12/2/2019.         Allergies as of 12/02/2019     (No Known Allergies)       Social History     Socioeconomic History     Marital status: Single     Spouse name: None     Number of children: None     Years of education: None     Highest education level: None   Occupational History     None   Social Needs     Financial resource strain: None     Food insecurity:     Worry: None     Inability: None     Transportation needs:     Medical: None     Non-medical: None   Tobacco Use     Smoking status: Current Every Day Smoker     Packs/day: 0.25     Types: Cigarettes     Smokeless tobacco: Never Used   Substance and Sexual Activity     Alcohol use: No     Alcohol/week: 0.0 standard drinks     Drug use: No     Sexual activity: Yes     Partners: Male     Birth control/protection: Inserts/Ring   Lifestyle     Physical activity:     Days per week: None     Minutes per session: None     Stress: None   Relationships     Social connections:     Talks on phone: None     Gets together: None     Attends [employer] service: None     Active member of club or organization: None     Attends meetings of clubs or organizations: None     Relationship status: None     Intimate partner violence:     Fear of current or ex partner: None     Emotionally abused: None     Physically abused: None     Forced sexual activity: None   Other Topics Concern     Parent/sibling w/ CABG, MI or angioplasty before 65F 55M? Not Asked   Social History Narrative     None        Family  History   Problem Relation Age of Onset     Diabetes Brother      Hypertension No family hx of      Breast Cancer No family hx of      Ovarian Cancer No family hx of      Cerebrovascular Disease No family hx of      Thyroid Disease No family hx of          Review Of Systems  10 point ROS of systems including Constitutional, Eyes, Respiratory, Cardiovascular, Gastroenterology, Genitourinary, Integumentary, Muscularskeletal, Psychiatric were all negative except for pertinent positives noted in my HPI and in the PMH.      Exam:   /78   Pulse 91   Wt 86.3 kg (190 lb 3.2 oz)   LMP 11/04/2019 (Approximate)   Breastfeeding No   BMI 37.15 kg/m    GENERAL:  WNWD female NAD  HEENT: NC/AT, EOMI  Lungs:  Good respiratory effort   SKIN: normal skin turgor  GAIT: Normal  NECK: Symmetrical, no masses noted   VULVA: Normal Genitalia  BUS: Normal  URETHRA:  No hypermobility noted  URETHRAL MEATUS:  No masses noted  VAGINA: Normal mucosa, no discharge  CERVIX: Closed, mobile, no discharge  PERIANAL:  No masses or lesions seen  EXTREMITIES: no clubbing, cyanosis, or edema    Assessment:  NIL,OTHER HIGH RISK HPV    Plan:  Recommend to Proceed with Colpo  The details of the colposcopic procedure were reviewed, the risks of missed diagnoses, pain, infection, and bleeding.          Procedure:  Procedure for colposcopy and biopsy has been explained to the patient and consent obtained.    Before the procedure, it was ensured that the patient was educated regarding the nature of her findings and implications to date.  We reviewed the role of HPV and the natural history of the infection.  We also reviewed ways to minimize her future risk, the effect of HPV on the cervix and treatment options available, should they be indicated.    The pathophysiology of the cervix, including a discussion of the squamous and columnar cells, metaplasia and dysplasia have been reviewed, drawings, sketches and the pamphlets were reviewed with her.  The  details of the colposcopic procedure were reviewed, the risks of missed diagnoses, pain, infection, and bleeding.  Questions seemed to be answered before proceeding and the patient then consented to the procedure.     Speculum placed in vagina and excellent visualization of cervix achieved, cervix swabbed  with acetic acid solution.    ECC DONE.  Findings:    Cervix: no visible lesions  Vaginal inspection: no visible lesions.  Procedure Summary: Patient tolerated procedure well.      Assessment:     ICD-10-CM    1. Cervical high risk HPV (human papillomavirus) test positive R87.810 COLP CERVIX/UPPER VAGINA W BX CERVIX/ENDOCERV CURETT     Surgical pathology exam           Plan:  Specimens labelled and sent to pathology.  Will base further treatment on pathology findings.  Post biopsy instructions given to patient and call to discuss Pathology results.    CEPHAS AGBEH, MD.

## 2019-12-02 NOTE — PATIENT INSTRUCTIONS
If you have any questions regarding your visit, Please contact your care team.  [facility]: [phone]  [facility] CLINIC HOURS TELEPHONE NUMBER   Cephas Agbeh, M.D.          Jonathon Montaño         Monday-[place]    8:00a.m-4:45 p.m    Tuesday--[place]     8:00a.m-4:45 p.m.    Thursday-Chris    8:00a.m-4:45 p.m.    Friday-Chris    8:00a.m-4:45 p.m    [facility]   [address]   [place] [address] [phone]-Ask for [facility]   Fax [phone]   Mzvvoet-[phone]     [facility]   [address] [place] [address] [place] [address]   [phone]    [facility]  [address] [facility] [address]  [phone]  Sxhkzip-[phone]   Urgent Care locations:    [facility] Monday-Friday  5 pm - 9 pm  Saturday and Sunday   9 am - 5 pm   Monday-Friday   5 pm - 9 pm  Saturday and Sunday  9 am - 5 pm    [phone]   If you need a medication refill, please contact your pharmacy. Please allow 3 business days for your refill to be completed.  As always, Thank you for trusting us with your healthcare needs!

## 2019-12-04 LAB — COPATH REPORT: NORMAL

## 2019-12-13 NOTE — PROGRESS NOTES

## 2020-02-13 ENCOUNTER — E-VISIT (OUTPATIENT)
Dept: FAMILY MEDICINE | Facility: CLINIC | Age: 35
End: 2020-02-13

## 2020-02-13 DIAGNOSIS — J01.00 ACUTE NON-RECURRENT MAXILLARY SINUSITIS: Primary | ICD-10-CM

## 2020-02-13 DIAGNOSIS — J20.9 ACUTE BRONCHITIS, UNSPECIFIED ORGANISM: ICD-10-CM

## 2020-02-13 PROCEDURE — 99421 OL DIG E/M SVC 5-10 MIN: CPT | Performed by: PHYSICIAN ASSISTANT

## 2020-02-14 RX ORDER — PREDNISONE 20 MG/1
20 TABLET ORAL 2 TIMES DAILY
Qty: 10 TABLET | Refills: 0 | Status: SHIPPED | OUTPATIENT
Start: 2020-02-14 | End: 2020-02-19

## 2020-02-14 RX ORDER — AMOXICILLIN 875 MG
875 TABLET ORAL 2 TIMES DAILY
Qty: 20 TABLET | Refills: 0 | Status: SHIPPED | OUTPATIENT
Start: 2020-02-14 | End: 2020-02-24

## 2020-02-14 RX ORDER — CODEINE PHOSPHATE AND GUAIFENESIN 10; 100 MG/5ML; MG/5ML
2 SOLUTION ORAL
Qty: 120 ML | Refills: 0 | Status: SHIPPED | OUTPATIENT
Start: 2020-02-14 | End: 2021-01-29

## 2020-02-14 RX ORDER — ALBUTEROL SULFATE 90 UG/1
2 AEROSOL, METERED RESPIRATORY (INHALATION) EVERY 6 HOURS
Qty: 1 INHALER | Refills: 1 | Status: SHIPPED | OUTPATIENT
Start: 2020-02-14 | End: 2024-08-27

## 2020-02-14 NOTE — PATIENT INSTRUCTIONS
Thank you for choosing us for your care. I have placed an order for a prescription so that you can start treatment. View your full visit summary for details by clicking on the link below. Your pharmacist will able to address any questions you may have about the medication.     If you're not feeling better within 5-7 days, please schedule an appointment.  You can schedule an appointment right here in [place], or call [phone]  If the visit is for the same symptoms as your e-visit, we'll refund the cost of your e-visit if seen within seven days.     The symptoms you describe suggest a viral cause, which is much more common than a bacterial cause. Antibiotics will treat bacterial infections, but have no effect on viral infections. If possible, especially if improving, start with symptom care for the first 7-10 days, then consider seeking further treatment or taking an antibiotic. Bacterial infections generally are more severe, including symptoms such as pus, fever over 101degrees F, or rapidly worsening.    Viral Upper Respiratory Illness (Adult)    You have a viral upper respiratory illness (URI), which is another term for the common cold. This illness is contagious during the first few days. It is spread through the air by coughing and sneezing. It may also be spread by direct contact (touching the sick person and then touching your own eyes, nose, or mouth). Frequent handwashing will decrease risk of spread. Most viral illnesses go away within 7 to 10 days with rest and simple home remedies. Sometimes the illness may last for several weeks. Antibiotics will not kill a virus, and they are generally not prescribed for this condition.  Home care    If symptoms are severe, rest at home for the first 2 to 3 days. When you resume activity, don't let yourself get too tired.    Don't smoke. If you need help stopping, talk with your healthcare provider.    Avoid being exposed to cigarette smoke (yours or  others ).    You may use acetaminophen or ibuprofen to control pain and fever, unless another medicine was prescribed. If you have chronic liver or kidney disease, have ever had a stomach ulcer or gastrointestinal bleeding, or are taking blood-thinning medicines, talk with your healthcare provider before using these medicines. Aspirin should never be given to anyone under 18 years of age who is ill with a viral infection or fever. It may cause severe liver or brain damage.    Your appetite may be poor, so a light diet is fine. Stay well hydrated by drinking 6 to 8 glasses of fluids per day (water, soft drinks, juices, tea, or soup). Extra fluids will help loosen secretions in the nose and lungs.    Over-the-counter cold medicines will not shorten the length of time you re sick, but they may be helpful for the following symptoms: cough, sore throat, and nasal and sinus congestion. If you take prescription medicines, ask your healthcare provider or pharmacist which over-the-counter medicines are safe to use. (Note: Don't use decongestants if you have high blood pressure.)  Follow-up care  Follow up with your healthcare provider, or as advised.  When to seek medical advice  Call your healthcare provider right away if any of these occur:    Cough with lots of colored sputum (mucus)    Severe headache; face, neck, or ear pain    Difficulty swallowing due to throat pain    Fever of 100.4 F (38 C) or higher, or as directed by your healthcare provider  Call 911  Call 911 if any of these occur:    Chest pain, shortness of breath, wheezing, or difficulty breathing    Coughing up blood    Very severe pain with swallowing, especially if it goes along with a muffled voice   Date Last Reviewed: 6/1/2018 2000-2019 [employer]. [address] [place] [address]. All rights reserved. This information is not intended as a substitute for professional medical care. Always follow your healthcare professional's  instructions.          Sinusitis (Antibiotic Treatment)    The sinuses are air-filled spaces within the bones of the face. They connect to the inside of the nose. Sinusitis is an inflammation of the tissue that lines the sinuses. Sinusitis can occur during a cold. It can also happen due to allergies to pollens and other particles in the air. Sinusitis can cause symptoms of sinus congestion and a feeling of fullness. A sinus infection causes fever, headache, and facial pain. There is often green or yellow fluid draining from the nose or into the back of the throat (post-nasal drip). You have been given antibiotics to treat this condition.  Home care    Take the full course of antibiotics as instructed. Do not stop taking them, even when you feel better.    Drink plenty of water, hot tea, and other liquids. This may help thin nasal mucus. It also may help your sinuses drain fluids.    Heat may help soothe painful areas of your face. Use a towel soaked in hot water. Or,  the shower and direct the warm spray onto your face. Using a vaporizer along with a menthol rub at night may also help soothe symptoms.     An expectorant with guaifenesin may help thin nasal mucus and help your sinuses drain fluids.    You can use an over-the-counter decongestant, unless a similar medicine was prescribed to you. Nasal sprays work the fastest. Use one that contains phenylephrine or oxymetazoline. First blow your nose gently. Then use the spray. Do not use these medicines more often than directed on the label. If you do, your symptoms may get worse. You may also take pills that contain pseudoephedrine. Don t use products that combine multiple medicines. This is because side effects may be increased. Read labels. You can also ask the pharmacist for help. (People with high blood pressure should not use decongestants. They can raise blood pressure.)    Over-the-counter antihistamines may help if allergies contributed to your  sinusitis.      Do not use nasal rinses or irrigation during an acute sinus infection, unless your healthcare provider tells you to. Rinsing may spread the infection to other areas in your sinuses.    Use acetaminophen or ibuprofen to control pain, unless another pain medicine was prescribed to you. If you have chronic liver or kidney disease or ever had a stomach ulcer, talk with your healthcare provider before using these medicines. (Aspirin should never be taken by anyone under age 18 who is ill with a fever. It may cause severe liver damage.)    Don't smoke. This can make symptoms worse.  Follow-up care  Follow up with your healthcare provider or our staff if you are not better in 1 week.  When to seek medical advice  Call your healthcare provider if any of these occur:    Facial pain or headache that gets worse    Stiff neck    Unusual drowsiness or confusion    Swelling of your forehead or eyelids    Vision problems, such as blurred or double vision    Fever of 100.4 F (38 C) or higher, or as directed by your healthcare provider    Seizure    Breathing problems    Symptoms don't go away in 10 days  Prevention  Here are steps you can take to help prevent an infection:    Keep good hand washing habits.    Don t have close contact with people who have sore throats, colds, or other upper respiratory infections.    Don t smoke, and stay away from secondhand smoke.    Stay up to date with of your vaccines.  Date Last Reviewed: 11/1/2017 2000-2019 [employer]. [address] [place] [address], [place] [address]. All rights reserved. This information is not intended as a substitute for professional medical care. Always follow your healthcare professional's instructions.

## 2020-02-14 NOTE — TELEPHONE ENCOUNTER
Provider E-Visit time total (minutes): 5    Based on symptoms , I suspect a sinus infection with bronchitis. I am starting her on a course of prednisone as well as an antibiotic. Also an inhaler and a codeine cough syrup for at bedtime.    Heron was seen today for sinus problem.    Diagnoses and all orders for this visit:    Acute non-recurrent maxillary sinusitis  -     amoxicillin (AMOXIL) 875 MG tablet; Take 1 tablet (875 mg) by mouth 2 times daily for 10 days  -     predniSONE (DELTASONE) 20 MG tablet; Take 1 tablet (20 mg) by mouth 2 times daily for 5 days    Acute bronchitis, unspecified organism  -     predniSONE (DELTASONE) 20 MG tablet; Take 1 tablet (20 mg) by mouth 2 times daily for 5 days  -     albuterol (PROAIR HFA/PROVENTIL HFA/VENTOLIN HFA) 108 (90 Base) MCG/ACT inhaler; Inhale 2 puffs into the lungs every 6 hours  -     guaiFENesin-codeine (ROBITUSSIN AC) 100-10 MG/5ML solution; Take 10 mLs by mouth nightly as needed      Advised supportive and symptomatic treatment.  Follow up with Provider - if condition persists or worsens.

## 2020-12-04 ENCOUNTER — MYC MEDICAL ADVICE (OUTPATIENT)
Dept: OBGYN | Facility: CLINIC | Age: 35
End: 2020-12-04

## 2020-12-06 ENCOUNTER — HEALTH MAINTENANCE LETTER (OUTPATIENT)
Age: 35
End: 2020-12-06

## 2021-01-06 ENCOUNTER — PATIENT OUTREACH (OUTPATIENT)
Dept: OBGYN | Facility: CLINIC | Age: 36
End: 2021-01-06

## 2021-01-06 DIAGNOSIS — R87.810 CERVICAL HIGH RISK HPV (HUMAN PAPILLOMAVIRUS) TEST POSITIVE: ICD-10-CM

## 2021-01-29 ENCOUNTER — OFFICE VISIT (OUTPATIENT)
Dept: OBGYN | Facility: CLINIC | Age: 36
End: 2021-01-29
Payer: COMMERCIAL

## 2021-01-29 VITALS
BODY MASS INDEX: 37.16 KG/M2 | HEIGHT: 61 IN | SYSTOLIC BLOOD PRESSURE: 124 MMHG | DIASTOLIC BLOOD PRESSURE: 80 MMHG | WEIGHT: 196.8 LBS

## 2021-01-29 DIAGNOSIS — Z01.411 ENCOUNTER FOR GYNECOLOGICAL EXAMINATION WITH ABNORMAL FINDING: Primary | ICD-10-CM

## 2021-01-29 DIAGNOSIS — R87.810 CERVICAL HIGH RISK HPV (HUMAN PAPILLOMAVIRUS) TEST POSITIVE: ICD-10-CM

## 2021-01-29 LAB
ALBUMIN SERPL-MCNC: 3.7 G/DL (ref 3.4–5)
ALP SERPL-CCNC: 67 U/L (ref 40–150)
ALT SERPL W P-5'-P-CCNC: 29 U/L (ref 0–50)
ANION GAP SERPL CALCULATED.3IONS-SCNC: 7 MMOL/L (ref 3–14)
AST SERPL W P-5'-P-CCNC: 10 U/L (ref 0–45)
BILIRUB SERPL-MCNC: 0.2 MG/DL (ref 0.2–1.3)
BUN SERPL-MCNC: 16 MG/DL (ref 7–30)
CALCIUM SERPL-MCNC: 9 MG/DL (ref 8.5–10.1)
CHLORIDE SERPL-SCNC: 108 MMOL/L (ref 94–109)
CO2 SERPL-SCNC: 25 MMOL/L (ref 20–32)
CREAT SERPL-MCNC: 0.83 MG/DL (ref 0.52–1.04)
ERYTHROCYTE [DISTWIDTH] IN BLOOD BY AUTOMATED COUNT: 13.5 % (ref 10–15)
GFR SERPL CREATININE-BSD FRML MDRD: >90 ML/MIN/{1.73_M2}
GLUCOSE SERPL-MCNC: 84 MG/DL (ref 70–99)
HCT VFR BLD AUTO: 43.7 % (ref 35–47)
HGB BLD-MCNC: 14.6 G/DL (ref 11.7–15.7)
MCH RBC QN AUTO: 29.4 PG (ref 26.5–33)
MCHC RBC AUTO-ENTMCNC: 33.4 G/DL (ref 31.5–36.5)
MCV RBC AUTO: 88 FL (ref 78–100)
PLATELET # BLD AUTO: 187 10E9/L (ref 150–450)
POTASSIUM SERPL-SCNC: 3.5 MMOL/L (ref 3.4–5.3)
PROT SERPL-MCNC: 7.6 G/DL (ref 6.8–8.8)
RBC # BLD AUTO: 4.97 10E12/L (ref 3.8–5.2)
SODIUM SERPL-SCNC: 140 MMOL/L (ref 133–144)
TSH SERPL DL<=0.005 MIU/L-ACNC: 1.08 MU/L (ref 0.4–4)
WBC # BLD AUTO: 13.4 10E9/L (ref 4–11)

## 2021-01-29 PROCEDURE — 36415 COLL VENOUS BLD VENIPUNCTURE: CPT | Performed by: OBSTETRICS & GYNECOLOGY

## 2021-01-29 PROCEDURE — 88175 CYTOPATH C/V AUTO FLUID REDO: CPT | Performed by: OBSTETRICS & GYNECOLOGY

## 2021-01-29 PROCEDURE — 85027 COMPLETE CBC AUTOMATED: CPT | Performed by: OBSTETRICS & GYNECOLOGY

## 2021-01-29 PROCEDURE — 87624 HPV HI-RISK TYP POOLED RSLT: CPT | Performed by: OBSTETRICS & GYNECOLOGY

## 2021-01-29 PROCEDURE — 84443 ASSAY THYROID STIM HORMONE: CPT | Performed by: OBSTETRICS & GYNECOLOGY

## 2021-01-29 PROCEDURE — 80053 COMPREHEN METABOLIC PANEL: CPT | Performed by: OBSTETRICS & GYNECOLOGY

## 2021-01-29 PROCEDURE — 99395 PREV VISIT EST AGE 18-39: CPT | Performed by: OBSTETRICS & GYNECOLOGY

## 2021-01-29 PROCEDURE — 88141 CYTOPATH C/V INTERPRET: CPT | Performed by: PATHOLOGY

## 2021-01-29 RX ORDER — ETONOGESTREL AND ETHINYL ESTRADIOL VAGINAL RING .015; .12 MG/D; MG/D
1 RING VAGINAL
Qty: 9 EACH | Refills: 3 | Status: SHIPPED | OUTPATIENT
Start: 2021-01-29 | End: 2021-05-25

## 2021-01-29 ASSESSMENT — MIFFLIN-ST. JEOR: SCORE: 1529.18

## 2021-01-29 NOTE — PATIENT INSTRUCTIONS
If you have any questions regarding your visit, Please contact your care team.  [facility]: [phone]  [facility] CLINIC HOURS TELEPHONE NUMBER   Cephas Agbeh, M.D. Becky-RN Kylie-RN Heidi-RN Deanna-MA Angela-  Margarita-         Monday-[place]    8:00a.m-4:45 p.m    Tuesday--[place]     8:00a.m-4:45 p.m.    Thursday-[place]    8:00a.m-4:45 p.m.    Friday-[place]    8:00a.m-4:45 p.m    [facility]   [address]   [place] [address]   [phone]   Fax [phone]   [id number]-[phone]     [facility]   [address] [place] [address] [place] [address]   [phone]    [facility]  [address] [facility] [address]  [phone]  [id number]-[phone]   Urgent Care locations:    [facility] Monday-Friday  5 pm - 9 pm  Saturday and Sunday   9 am - 5 pm   Monday-Friday   5 pm - 9 pm  Saturday and Sunday  9 am - 5 pm    [phone]   If you need a medication refill, please contact your pharmacy. Please allow 3 business days for your refill to be completed.  As always, Thank you for trusting us with your healthcare needs!

## 2021-01-29 NOTE — PROGRESS NOTES
"Heron is a 35 year old  here for annual exam.   Needs Nuvaring refills. Normal menses.    ROS: Ten point review of systems was reviewed and negative except the above.    Health Maintenance   Topic Date Due     Pneumococcal Vaccine: Pediatrics (0 to 5 Years) and At-Risk Patients (6 to 64 Years) (1 of 1 - PPSV23) 1993     HEPATITIS C SCREENING  2005     PREVENTIVE CARE VISIT  10/28/2020     PAP FOLLOW-UP  2020     HPV FOLLOW-UP  2020     DTAP/TDAP/TD IMMUNIZATION (4 - Td) 07/15/2029     HIV SCREENING  Completed     PHQ-2  Completed     INFLUENZA VACCINE  Completed     IPV IMMUNIZATION  Aged Out     MENINGITIS IMMUNIZATION  Aged Out     HEPATITIS B IMMUNIZATION  Aged Out      Last pap: 2019/ HPV pos.  Last Mammogram: none  Last Dexa: none      OBHX:    OB History    Para Term  AB Living   3 2 1 0 0 2   SAB TAB Ectopic Multiple Live Births   0 0 0 0 2      # Outcome Date GA Lbr Master/2nd Weight Sex Delivery Anes PTL Lv   3 Term 19 39w2d   M CS-LTranv   KHOI   2 Para 12/06/15 40w0d  3.515 kg (7 lb 12 oz) F CS-LTranv  N KHOI      Name: Alexander   1                   Past Surgical History:   Procedure Laterality Date     APPENDECTOMY      as a child     deviated septum      teenager     GYN SURGERY  2019           PMH: Her past medical, surgical, and obstetric histories were reviewed and are documented in their appropriate chart areas.    ALL/Meds: Her medication and allergy histories were reviewed and are documented in their appropriate chart areas.    SH/FMH: Her social and family history was reviewed and documented in its appropriate chart area.    PE: /80   Ht 1.54 m (5' 0.63\")   Wt 89.3 kg (196 lb 12.8 oz)   LMP 2021 (Approximate)   Breastfeeding No   BMI 37.64 kg/m    Body mass index is 37.64 kg/m .    General Appearance:  healthy, alert, active, no distress  Cardiovascular:  Regular rate and Rhythm  Neck: Supple, no adenopathy and " thyroid normal  Lungs:  Clear, without wheeze, rale or rhonchi  Breast: normal breast exam  Abdomen: Benign, Soft, flat, non-tender, No masses, organomegaly, No inguinal nodes and Bowel sounds normoactiveSoft, nontender.   Pelvic:       - Ext: Vulva and perineum are normal without lesion, mass or discharge        - Urethra: normal without discharge or scarring or hypermobility       - Urethral Meatus: normal appearance,        - Bladder: no tenderness, no masses       - Vagina: Normal mucosa, no discharge     rugated       - Cervix: multiparous       - Uterus:Normal shape, position and consistencyfirm, nontender, nongravid uterus without CMT       - Adnexa: Normal without masses or tenderness       - Rectal: deferred    A/P:  [facility],     ICD-10-CM    1. Encounter for gynecological examination with abnormal finding  Z01.411 CBC with platelets     Comprehensive metabolic panel     TSH with free T4 reflex     Pap imaged thin layer screen with HPV - recommended age 30 - 65 years (select HPV order below)     HPV High Risk Types DNA Cervical   2. Cervical high risk HPV (human papillomavirus) test positive  R87.810 etonogestrel-ethinyl estradiol (NUVARING) 0.12-0.015 MG/24HR vaginal ring      -  BC: NUVARING      - Encouraged self-breast exam   - Encouraged low fat diet, regular exercise, and adequate calcium intake.    CEPHAS AGBEH, MD.

## 2021-02-04 LAB
COPATH REPORT: ABNORMAL
PAP: ABNORMAL

## 2021-02-05 ENCOUNTER — PATIENT OUTREACH (OUTPATIENT)
Dept: OBGYN | Facility: CLINIC | Age: 36
End: 2021-02-05

## 2021-02-05 NOTE — TELEPHONE ENCOUNTER
1/29/21 ASCUS pap, + HR HPV (not 16 or 18). Plan colp due bef 4/29/21 per provider.     Bereket Livingston, AUDREYN, RN   Pap Tracking Nurse

## 2021-02-22 ENCOUNTER — OFFICE VISIT (OUTPATIENT)
Dept: OBGYN | Facility: CLINIC | Age: 36
End: 2021-02-22
Payer: COMMERCIAL

## 2021-02-22 VITALS
DIASTOLIC BLOOD PRESSURE: 79 MMHG | BODY MASS INDEX: 37.91 KG/M2 | HEART RATE: 80 BPM | WEIGHT: 198.2 LBS | SYSTOLIC BLOOD PRESSURE: 117 MMHG

## 2021-02-22 DIAGNOSIS — R87.810 ASCUS WITH POSITIVE HIGH RISK HPV CERVICAL: ICD-10-CM

## 2021-02-22 DIAGNOSIS — R87.610 ASCUS WITH POSITIVE HIGH RISK HPV CERVICAL: ICD-10-CM

## 2021-02-22 PROCEDURE — 57454 BX/CURETT OF CERVIX W/SCOPE: CPT | Performed by: OBSTETRICS & GYNECOLOGY

## 2021-02-22 PROCEDURE — 88342 IMHCHEM/IMCYTCHM 1ST ANTB: CPT | Performed by: PATHOLOGY

## 2021-02-22 PROCEDURE — 88305 TISSUE EXAM BY PATHOLOGIST: CPT | Performed by: PATHOLOGY

## 2021-02-22 NOTE — PATIENT INSTRUCTIONS
If you have any questions regarding your visit, Please contact your care team.  [facility]: [phone]  [facility] CLINIC HOURS TELEPHONE NUMBER   Cephas Agbeh, M.D. Becky-RN Kylie-RN Heidi-RN Deanna-MA Angela-  Margarita-         Monday-[place]    8:00a.m-4:45 p.m    Tuesday--[place]     8:00a.m-4:45 p.m.    Thursday-[place]    8:00a.m-4:45 p.m.    Friday-[place]    8:00a.m-4:45 p.m    [facility]   [address]   [place] [address]   [phone]   Fax [phone]   [phone]     [facility]   [address] [place] [address] [place] [address]   [phone]    [facility]  [address] [facility] [address]  [phone]  [id number]-[phone]   Urgent Care locations:    [facility] Monday-Friday  5 pm - 9 pm  Saturday and Sunday   9 am - 5 pm   Monday-Friday   5 pm - 9 pm  Saturday and Sunday  9 am - 5 pm    [phone]   If you need a medication refill, please contact your pharmacy. Please allow 3 business days for your refill to be completed.  As always, Thank you for trusting us with your healthcare needs!

## 2021-02-22 NOTE — PROGRESS NOTES
Patient Name: Heron Gee              Date: 2/22/2021   YOB: 1985                         Age: 35 year old   Phone: [phone] (home)   ________________________________________________________________________   Heron is here for consultation  to discuss the pap smear, findings and possible further evaluation.  The patient's pap smear history is as noted:  4/17/15: Pap - ASCUS. Repeat pap and HPV at 6 wk pp exam.   1/28/16: Pap - NIL. Repeat pap in 3 years.   4/15/19 ASCUS Pap, + HR HPV (Neg 16/18). Pt pregnant. Plan colp after 20 weeks  6/27/19 [facility] visually normal; no biopsies taken. Plan cotest at postpartum visit.   10/28/19 NIL pap, + HR HPV (not 16 or 18). Plan colp.   12/2/19 [facility] ECC - Negative. Plan cotest in 1 year.   12/04/20 Reminder MyChart - read  1/6/21 Reminder call - left msg  1/29/21 ASCUS pap, + HR HPV (not 16 or 18). Plan colp due bef 4/29/21 per provider.  2/5/21 LM on . [email or web address] message sent - read on 2/6/21.   I attempted to ensure that the patient was educated regarding the nature of her findings and implications to date.  We reviewed the role of HPV, incidence in the population and the natural history of the infection, and its transmission.  We also reviewed ways to minimize her future risk, the effect of HPV on the cervix and treatment options available, should they be indicated.    The pathophysiology of the cervix, including a discussion of the squamous and columnar cells, metaplasia and dysplasia have been reviewed, drawings, sketches and the pamphlets were reviewed with her.      Patient's last menstrual period was 02/01/2021 (approximate).  Current Birth Control Method: Nuva ring    Past Medical History:   Diagnosis Date     Abnormal Pap smear of cervix     4/17/15, 2019. 1/29/21     Cervical high risk HPV (human papillomavirus) test positive     04/15/2019, 10/28/19, 1/29/21       Past Surgical History:   Procedure Laterality Date     APPENDECTOMY      as a child      deviated septum      teenager     GYN SURGERY  2019            Outpatient Encounter Medications as of 2021   Medication Sig Dispense Refill     acetaminophen (TYLENOL) 500 MG tablet Take 1,000 mg by mouth once       albuterol (PROAIR HFA/PROVENTIL HFA/VENTOLIN HFA) 108 (90 Base) MCG/ACT inhaler Inhale 2 puffs into the lungs every 6 hours 1 Inhaler 1     etonogestrel-ethinyl estradiol (NUVARING) 0.12-0.015 MG/24HR vaginal ring Place 1 each vaginally every 28 days 9 each 3     fexofenadine (ALLEGRA) 180 MG tablet Take 180 mg by mouth daily       Prenatal Vit-Fe Fumarate-FA (PRENATAL MULTIVITAMIN W/IRON) 27-0.8 MG tablet Take 1 tablet by mouth every morning        No facility-administered encounter medications on file as of 2021.         Allergies as of 2021     (No Known Allergies)       Social History     Socioeconomic History     Marital status: Single     Spouse name: None     Number of children: None     Years of education: None     Highest education level: None   Occupational History     None   Social Needs     Financial resource strain: None     Food insecurity     Worry: None     Inability: None     Transportation needs     Medical: None     Non-medical: None   Tobacco Use     Smoking status: Current Every Day Smoker     Packs/day: 0.25     Types: Cigarettes     Smokeless tobacco: Never Used   Substance and Sexual Activity     Alcohol use: No     Alcohol/week: 0.0 standard drinks     Drug use: No     Sexual activity: Yes     Partners: Male     Birth control/protection: Inserts/Ring   Lifestyle     Physical activity     Days per week: None     Minutes per session: None     Stress: None   Relationships     Social connections     Talks on phone: None     Gets together: None     Attends [employer] service: None     Active member of club or organization: None     Attends meetings of clubs or organizations: None     Relationship status: None     Intimate partner violence     Fear of  current or ex partner: None     Emotionally abused: None     Physically abused: None     Forced sexual activity: None   Other Topics Concern     Parent/sibling w/ CABG, MI or angioplasty before 65F 55M? Not Asked   Social History Narrative     None        Family History   Problem Relation Age of Onset     Diabetes Brother      Hypertension No family hx of      Breast Cancer No family hx of      Ovarian Cancer No family hx of      Cerebrovascular Disease No family hx of      Thyroid Disease No family hx of          Review Of Systems  10 point ROS of systems including Constitutional, Eyes, Respiratory, Cardiovascular, Gastroenterology, Genitourinary, Integumentary, Muscularskeletal, Psychiatric were all negative except for pertinent positives noted in my HPI and in the PMH.      Exam:   /79   Pulse 80   Wt 89.9 kg (198 lb 3.2 oz)   LMP 02/01/2021 (Approximate)   Breastfeeding No   BMI 37.91 kg/m    GENERAL:  WNWD female NAD  HEENT: NC/AT, EOMI  Lungs:  Good respiratory effort   SKIN: normal skin turgor  GAIT: Normal  NECK: Symmetrical, no masses noted   VULVA: Normal Genitalia  BUS: Normal  URETHRA:  No hypermobility noted  URETHRAL MEATUS:  No masses noted  VAGINA: Normal mucosa, no discharge  CERVIX: Closed, mobile, no discharge  PERIANAL:  No masses or lesions seen  EXTREMITIES: no clubbing, cyanosis, or edema         Procedure:  Procedure for colposcopy and biopsy has been explained to the patient and consent obtained.    Before the procedure, it was ensured that the patient was educated regarding the nature of her findings and implications to date.  We reviewed the role of HPV and the natural history of the infection.  We also reviewed ways to minimize her future risk, the effect of HPV on the cervix and treatment options available, should they be indicated.    The pathophysiology of the cervix, including a discussion of the squamous and columnar cells, metaplasia and dysplasia have been reviewed,  drawings, sketches and the pamphlets were reviewed with her.  The details of the colposcopic procedure were reviewed, the risks of missed diagnoses, pain, infection, and bleeding.  Questions seemed to be answered before proceeding and the patient then consented to the procedure.     Speculum placed in vagina and excellent visualization of cervix achieved, cervix swabbed  with acetic acid solution.    biopsies taken : ECC and cervical at 12 oclock  Hemostasis effected with Silver Nitrate.     Findings:    Cervix: acetowhitening noted 12 oclock  Vaginal inspection: no visible lesions.  Procedure Summary: Patient tolerated procedure well.      Assessment/Plan:    ICD-10-CM    1. ASCUS with positive high risk HPV cervical  R87.610 COLP CERVIX/UPPER VAGINA W BX CERVIX    R87.810 Surgical pathology exam       Recommend to Proceed with Colpo  The details of the colposcopic procedure were reviewed, the risks of missed diagnoses, pain, infection, and bleeding.    Specimens labelled and sent to pathology.  Will base further treatment on pathology findings.  Post biopsy instructions given to patient and call to discuss Pathology results.  ACOG pamphlet given  CEPHAS AGBEH, MD.

## 2021-02-25 LAB — COPATH REPORT: NORMAL

## 2021-02-26 ENCOUNTER — PATIENT OUTREACH (OUTPATIENT)
Dept: OBGYN | Facility: CLINIC | Age: 36
End: 2021-02-26

## 2021-02-26 DIAGNOSIS — N87.1 MODERATE DYSPLASIA OF CERVIX (CIN II): ICD-10-CM

## 2021-02-26 NOTE — TELEPHONE ENCOUNTER
2/22/21 [facility] - EUGENE 2, ECC - neg. Plan LEEP due by 5/22/21.  2/26/21 provider notified pt.

## 2021-03-05 ENCOUNTER — OFFICE VISIT (OUTPATIENT)
Dept: FAMILY MEDICINE | Facility: CLINIC | Age: 36
End: 2021-03-05
Payer: COMMERCIAL

## 2021-03-05 VITALS
RESPIRATION RATE: 20 BRPM | SYSTOLIC BLOOD PRESSURE: 122 MMHG | OXYGEN SATURATION: 96 % | BODY MASS INDEX: 38.44 KG/M2 | WEIGHT: 195.8 LBS | DIASTOLIC BLOOD PRESSURE: 81 MMHG | TEMPERATURE: 99.1 F | HEIGHT: 60 IN | HEART RATE: 111 BPM

## 2021-03-05 DIAGNOSIS — Z71.89 ADVANCE CARE PLANNING: Primary | ICD-10-CM

## 2021-03-05 DIAGNOSIS — N64.4 NIPPLE PAIN: ICD-10-CM

## 2021-03-05 DIAGNOSIS — N61.0 INFECTION OF NIPPLE: ICD-10-CM

## 2021-03-05 DIAGNOSIS — N64.52 NIPPLE DISCHARGE: ICD-10-CM

## 2021-03-05 PROCEDURE — 99213 OFFICE O/P EST LOW 20 MIN: CPT | Performed by: NURSE PRACTITIONER

## 2021-03-05 RX ORDER — CEPHALEXIN 500 MG/1
500 CAPSULE ORAL 3 TIMES DAILY
Qty: 21 CAPSULE | Refills: 0 | Status: SHIPPED | OUTPATIENT
Start: 2021-03-05 | End: 2021-03-12

## 2021-03-05 ASSESSMENT — MIFFLIN-ST. JEOR: SCORE: 1507.45

## 2021-03-05 NOTE — PATIENT INSTRUCTIONS
Patient Education     What Are Benign Breast Conditions?  Most breast conditions are not cancer (benign), causing no serious harm to you. But all women are at some risk for breast cancer. Your risk increases as you grow older.   Common benign breast changes are covered here. Having one of these conditions does not put you at a higher risk for breast cancer. But all breast changes should be checked by a healthcare provider to know what the change is and whether it should be treated.    Breast infection  Infections of the breast tissue can cause skin redness, warmth, and pain or tenderness. The most common infection is mastitis. This inflammation of the mammary glands can happen during breastfeeding. Mastitis and other breast infections are often treated with antibiotics.   Nipple discharge  Many women can squeeze a tiny amount of a clear or milky discharge from one or both nipples. This discharge may be normal. Other kinds of discharge may be symptoms of a breast condition. A dark or bloody discharge can be caused by a benign growth in a duct near the nipple (an intraductal papilloma). It should be checked by your healthcare provider.     Fibrocystic changes    These benign changes may cause a thickening and firmness in the breasts. Breasts may be tender or painful. They may feel more dense in some areas than in others. Sometimes solid or fluid-filled lumps (cysts) may also form. Cysts may be smooth, soft or firm, and tender. They may become larger and more tender right before your period. An ultrasound may be done to make sure that a lump is a fluid-filled cyst and not cancer.     Benign breast lumps  Benign breast lumps come in all shapes, textures, and sizes. A lump of fibrous tissue (fibroadenoma) may be smooth, firm, and rubbery. This type of lump is usually painless and movable. Have any lump checked by your healthcare provider.   Jignesh last reviewed this educational content on 6/1/2020 2000-2020 [employer]  [employer]. All rights reserved. This information is not intended as a substitute for professional medical care. Always follow your healthcare professional's instructions.

## 2021-03-15 ENCOUNTER — OFFICE VISIT (OUTPATIENT)
Dept: OBGYN | Facility: CLINIC | Age: 36
End: 2021-03-15
Payer: COMMERCIAL

## 2021-03-15 VITALS
BODY MASS INDEX: 39.42 KG/M2 | DIASTOLIC BLOOD PRESSURE: 85 MMHG | HEART RATE: 92 BPM | SYSTOLIC BLOOD PRESSURE: 120 MMHG | WEIGHT: 198.8 LBS

## 2021-03-15 DIAGNOSIS — N87.1 DYSPLASIA OF CERVIX, HIGH GRADE CIN 2: Primary | ICD-10-CM

## 2021-03-15 PROCEDURE — 88342 IMHCHEM/IMCYTCHM 1ST ANTB: CPT | Performed by: PATHOLOGY

## 2021-03-15 PROCEDURE — 88305 TISSUE EXAM BY PATHOLOGIST: CPT | Performed by: PATHOLOGY

## 2021-03-15 PROCEDURE — 57461 CONZ OF CERVIX W/SCOPE LEEP: CPT | Performed by: OBSTETRICS & GYNECOLOGY

## 2021-03-15 NOTE — PATIENT INSTRUCTIONS
If you have any questions regarding your visit, Please contact your care team.  [facility]: [phone]  Women s Health CLINIC HOURS TELEPHONE NUMBER   Cephas Agbeh, M.D. Becky-RN Kylie-RN Heidi-RN Deanna-MA Angela-  Margarita-         Monday-[place]    8:00a.m-4:45 p.m    Tuesday--[place]     8:00a.m-4:45 p.m.    Thursday-[place]    8:00a.m-4:45 p.m.    Friday-[place]    8:00a.m-4:45 p.m    [facility]   [address]   [place] [address]   [phone]   Fax [phone]   Azmxwhq-[phone]     [facility]   [address] [place] [address] [place] [address]   [phone]    [facility]  [address] [facility] [address]  [phone]  Yhdanlh-[phone]   Urgent Care locations:    [facility] Monday-Friday  5 pm - 9 pm  Saturday and Sunday   9 am - 5 pm   Monday-Friday   5 pm - 9 pm  Saturday and Sunday  9 am - 5 pm    [phone]   If you need a medication refill, please contact your pharmacy. Please allow 3 business days for your refill to be completed.  As always, Thank you for trusting us with your healthcare needs!

## 2021-03-15 NOTE — PROGRESS NOTES
"Vital signs:      BP: 120/85 Pulse: 92             Weight: 90.2 kg (198 lb 12.8 oz)  Estimated body mass index is 39.42 kg/m  as calculated from the following:    Height as of 3/5/21: 1.513 m (4' 11.55\").    Weight as of this encounter: 90.2 kg (198 lb 12.8 oz).      The patient and I discussed the procedure and the indications.   We reviewed the risks, benefits, goals, and limitations, as well as the potential complications.  Alternatives were also reviewed.  The anticipated post-op course, including the success and failure rates, limitations and consequences, was reviewed.  The potential for fertility issues, 1/5 risk, was also reviewed.  She voiced her understanding and she desired to proceed with the procedure.   After appropriate preparation, the colposcopic findings were noted.  Ten cc of 1% lidocaine with epinephrine, was injected into the cervix.  Using the wire loop and a blended current, the excision was performed with the specimen as labeled removing the transformation zone and the anticipated lesion with satisfactory margins and depth.  Endocervical curettings were obtained.  Light fulguration was performed and Monsel's solution/paste was applied.  No apparent complications.  Blood loss was minimal.  Patient tolerated the procedure well.    Patient was stable at discharge.  Await results.  Results will be called to patient or sent to her.  Instructions and information were given to the patient.    Follow up and repeat pap smears were discussed.    ICD-10-CM    1. Dysplasia of cervix, high grade EUGENE 2  N87.1 Surgical pathology exam     COLP CERVIX/UPPER VAGINA W LOOP ELEC BX CERVIX     CEPHAS AGBEH, MD.    "

## 2021-03-19 ENCOUNTER — TELEPHONE (OUTPATIENT)
Dept: OBGYN | Facility: CLINIC | Age: 36
End: 2021-03-19

## 2021-03-19 DIAGNOSIS — C53.8 MALIGNANT NEOPLASM OF OVERLAPPING SITES OF CERVIX (H): Primary | ICD-10-CM

## 2021-03-19 LAB — COPATH REPORT: NORMAL

## 2021-03-19 NOTE — TELEPHONE ENCOUNTER
Received call from Dr. Cox, pathologist. She was calling to speak directly to Dr. Agbeh however has been unable to connect. She states that there has been an urgent, likely unexpected finding she would like to relay. The results are as follows: Squamous cancer in cervix cone excision. She also states that the case is now signed out.     She asked that I route this urgently to Dr. Agbeh as FYI.    Dr. Cox's call back number: [phone]    BAO Pineda RN  [facility]    GYN ONCOLOGY REFERRAL HAS BEEN PLACED AND PATIENT HAS BEEN NOTIFIED.  CEPHAS AGBEH, MD.

## 2021-03-22 ENCOUNTER — TELEPHONE (OUTPATIENT)
Dept: OBGYN | Facility: CLINIC | Age: 36
End: 2021-03-22

## 2021-03-22 ENCOUNTER — ANCILLARY PROCEDURE (OUTPATIENT)
Dept: MAMMOGRAPHY | Facility: CLINIC | Age: 36
End: 2021-03-22
Attending: NURSE PRACTITIONER
Payer: COMMERCIAL

## 2021-03-22 ENCOUNTER — ANCILLARY PROCEDURE (OUTPATIENT)
Dept: ULTRASOUND IMAGING | Facility: CLINIC | Age: 36
End: 2021-03-22
Attending: NURSE PRACTITIONER
Payer: COMMERCIAL

## 2021-03-22 ENCOUNTER — MYC MEDICAL ADVICE (OUTPATIENT)
Dept: FAMILY MEDICINE | Facility: CLINIC | Age: 36
End: 2021-03-22

## 2021-03-22 DIAGNOSIS — N61.0 INFECTION OF NIPPLE: ICD-10-CM

## 2021-03-22 DIAGNOSIS — N64.52 NIPPLE DISCHARGE: ICD-10-CM

## 2021-03-22 DIAGNOSIS — C53.9 CERVICAL CANCER (H): ICD-10-CM

## 2021-03-22 DIAGNOSIS — N64.4 NIPPLE PAIN: ICD-10-CM

## 2021-03-22 PROCEDURE — 76642 ULTRASOUND BREAST LIMITED: CPT | Mod: RT | Performed by: RADIOLOGY

## 2021-03-22 PROCEDURE — G0279 TOMOSYNTHESIS, MAMMO: HCPCS | Performed by: RADIOLOGY

## 2021-03-22 PROCEDURE — 77066 DX MAMMO INCL CAD BI: CPT | Performed by: RADIOLOGY

## 2021-03-22 NOTE — TELEPHONE ENCOUNTER
ONCOLOGY INTAKE: Records Information      APPT INFORMATION:  Referring provider:  Agbeh, Cephas Mawuena, MD  Referring provider s clinic:   OB/GYN  Reason for visit/diagnosis: Malignant neoplasm of overlapping sites of cervix  Has patient been notified of appointment date and time?: Yes    RECORDS INFORMATION:  Were the records received with the referral (via [employer])? No    Has patient been seen for any external appt for this diagnosis? No    If yes, where? N/a    Has patient had any imaging or procedures outside of [facility] for this condition? No      If Yes, where? N/a    ADDITIONAL INFORMATION:  None

## 2021-03-22 NOTE — TELEPHONE ENCOUNTER
RECORDS STATUS - ALL OTHER DIAGNOSIS      RECORDS RECEIVED FROM: [facility]   DATE RECEIVED: 3/29/2021   NOTES STATUS DETAILS   OFFICE NOTE from referring provider Complete Agbeh, Cephas Mawuena, MD   OFFICE NOTE from medical oncologist N/A    DISCHARGE SUMMARY from hospital N/A 2019 Status post repeat low transverse  section    DISCHARGE REPORT from the ER     OPERATIVE REPORT Complete 2021 Colposcopy     2019 [place] Cervix/Upper Vagina with endocerv curett   MEDICATION LIST Complete [facility]   CLINICAL TRIAL TREATMENTS TO DATE     LABS     PATHOLOGY REPORTS Complete 3/15/2021   A: Cervix, LEEP conization:   - Invasive, well-differentiated, squamous cell carcinoma arising in   background moderate dysplasia; invasion   measured at 2.2 mm; lesion measures 5 mm.   -Endocervical margin involved by neoplasm   -Ectocervical margin negative   -Please see synoptic reporting template included in this report     B: Endocervix, curettage:   -High-grade squamous intraepithelial lesion present     2021  A: Endocervix, curettings:   - Negative for dysplasia or malignancy.     B: Cervix, 12:00, biopsy:   - High-grade squamous intraepithelial lesion (EUGENE 2).    ANYTHING RELATED TO DIAGNOSIS Complete Labs last updated on 2021   GENONOMIC TESTING     TYPE:     IMAGING (NEED IMAGES & REPORT)     CT SCANS     MRI     MAMMO     ULTRASOUND     PET       Action    Action Taken 3/22/2021 8:58AM     I called bertha Shelby - all records are internal.

## 2021-03-22 NOTE — TELEPHONE ENCOUNTER
"**  Forwarding chart as FYI to Gynecology Oncology team for ongoing care **    Patient previously followed by [facility], now being followed by Gyn Onc.  Last visit with Gyn Onc: none  Future visit scheduled: 3/29/21      [facility] tracking will be closed for this pt once this message appears \"viewed\" by the Gyn Onc team.    Thank you,  Linda De La Cruz RN  Pap Tracking       "

## 2021-03-23 ENCOUNTER — MYC MEDICAL ADVICE (OUTPATIENT)
Dept: FAMILY MEDICINE | Facility: CLINIC | Age: 36
End: 2021-03-23

## 2021-03-29 ENCOUNTER — PRE VISIT (OUTPATIENT)
Dept: ONCOLOGY | Facility: CLINIC | Age: 36
End: 2021-03-29

## 2021-03-29 ENCOUNTER — ONCOLOGY VISIT (OUTPATIENT)
Dept: ONCOLOGY | Facility: CLINIC | Age: 36
End: 2021-03-29
Attending: OBSTETRICS & GYNECOLOGY
Payer: COMMERCIAL

## 2021-03-29 VITALS
BODY MASS INDEX: 39.03 KG/M2 | TEMPERATURE: 98.4 F | OXYGEN SATURATION: 98 % | WEIGHT: 198.8 LBS | HEART RATE: 78 BPM | HEIGHT: 60 IN | RESPIRATION RATE: 14 BRPM | DIASTOLIC BLOOD PRESSURE: 85 MMHG | SYSTOLIC BLOOD PRESSURE: 115 MMHG

## 2021-03-29 DIAGNOSIS — C53.8 MALIGNANT NEOPLASM OF OVERLAPPING SITES OF CERVIX (H): Primary | ICD-10-CM

## 2021-03-29 PROCEDURE — 99205 OFFICE O/P NEW HI 60 MIN: CPT | Performed by: OBSTETRICS & GYNECOLOGY

## 2021-03-29 RX ORDER — ACETAMINOPHEN 325 MG/1
975 TABLET ORAL ONCE
Status: CANCELLED | OUTPATIENT
Start: 2021-03-29 | End: 2021-03-29

## 2021-03-29 RX ORDER — L.ACID/L.RHAM/B.BREVE/S.THERM 3B CELL
TABLET,CHEWABLE ORAL DAILY
COMMUNITY
End: 2024-01-10

## 2021-03-29 SDOH — HEALTH STABILITY: MENTAL HEALTH: HOW MANY STANDARD DRINKS CONTAINING ALCOHOL DO YOU HAVE ON A TYPICAL DAY?: NOT ASKED

## 2021-03-29 SDOH — HEALTH STABILITY: MENTAL HEALTH: HOW OFTEN DO YOU HAVE A DRINK CONTAINING ALCOHOL?: MONTHLY OR LESS

## 2021-03-29 SDOH — HEALTH STABILITY: MENTAL HEALTH: HOW OFTEN DO YOU HAVE 6 OR MORE DRINKS ON ONE OCCASION?: NOT ASKED

## 2021-03-29 ASSESSMENT — MIFFLIN-ST. JEOR: SCORE: 1524.28

## 2021-03-29 ASSESSMENT — PAIN SCALES - GENERAL: PAINLEVEL: NO PAIN (0)

## 2021-03-29 NOTE — NURSING NOTE
"Oncology Rooming Note    March 29, 2021 11:06 AM   Heron Gee is a 35 year old female who presents for:    Chief Complaint   Patient presents with     Oncology Clinic Visit     New patient     Initial Vitals: /85 (BP Location: Right arm)   Pulse 78   Temp 98.4  F (36.9  C) (Oral)   Resp 14   Ht 1.518 m (4' 11.75\")   Wt 90.2 kg (198 lb 12.8 oz)   LMP 03/08/2021 (Exact Date)   SpO2 98%   BMI 39.15 kg/m   Estimated body mass index is 39.15 kg/m  as calculated from the following:    Height as of this encounter: 1.518 m (4' 11.75\").    Weight as of this encounter: 90.2 kg (198 lb 12.8 oz). Body surface area is 1.95 meters squared.  No Pain (0) Comment: Data Unavailable   Patient's last menstrual period was 03/08/2021 (exact date).  Allergies reviewed: Yes  Medications reviewed: Yes    Medications: Medication refills not needed today.  Pharmacy name entered into [facility]: [facility] [place], [place] - [address], [address]    Clinical concerns: Would like to discuss concerns with the provider Dr. Barron was notified.      Taty Resendez CMA            "

## 2021-03-29 NOTE — PROGRESS NOTES
Gynecologic Oncology Clinic - New Patient    Referring provider:    [place]  [address] [place] [address]    Patient: Heron Gee  : 1985    Date of Visit: Mar 29, 2021     Chief Complaint: cervix cancer    History of Present Illness:  Heron Gee is a 35 year old  pre-menopausal female with PMH notable for current everyday smoker who presents for newly diagnosed cervical cancer. This was diagnosed on LEEP after abnormal Pap smear and CIN2 on biopsy. See history below.     She has done well since LEEP. Expected bleeding. Prior to that no concerns. She has 2 children and has completed child bearing. Her fiance was planning to get a vasectomy.  She does note purulent discharge from right nipple. She has tried antibiotics and had recent breast ultrasound and mammogram which were normal.         Pap Smear History:  4/17/15: Pap - ASCUS. Repeat pap and HPV at 6 wk pp exam.   16: Pap - NIL.   4/15/19 ASCUS Pap, + HR HPV (Neg 16/18). Pt pregnant.  19 [place] visually normal; no biopsies taken.   10/28/19 NIL pap, + HR HPV (not 16 or 18).    19 [place] ECC - Negative. Plan cotest in 1 year.   20 Reminder MyChart - read  21 Reminder call - left msg  21 ASCUS pap, + HR HPV (not 16 or 18).   2021: Colposcopy: 12 o'clock biopsy EUGENE 2, ECC negative  3/15/2021: LEEP: Invasive squamous cell carcinoma of the cervix, 2.2mm depth of invasion, negative LVSI, positive endocervical margin      Review of Systems:  Gen: fatigue, hot flashes, insomnia  HEENT: sinus congestion and headaches  CV: negative  Resp: SOB  GI: diarrhea  : as per above  MSK:neg  Skin: +discharge from right breast  Neuro: tingling in hands  Psych: anxiety  Endocrine: negative    Past Medical History  Past Medical History:   Diagnosis Date     Abnormal Pap smear of cervix     4/17/15, 2019. 21     Cervical high risk HPV (human papillomavirus) test positive     04/15/2019,  "10/28/19, 21       Past Surgical History  Past Surgical History:   Procedure Laterality Date     APPENDECTOMY      as a child     deviated septum      teenager     GYN SURGERY  2019            OB/Gynecologic History:     Menarche 13 yr, Menses regular heavy, cramping  Nuva Ring with monthly menses  Received HPV vaccine series in late teens.    Social History  Social History     Tobacco Use     Smoking status: Current Every Day Smoker     Packs/day: 0.25     Types: Cigarettes     Smokeless tobacco: Never Used   Substance Use Topics     Alcohol use: Yes     Alcohol/week: 0.0 standard drinks     Comment: occ     Drug use: No    She lives with her significant other, Johnathan, and two children.     Family History  Family History   Problem Relation Age of Onset     Diabetes Brother      Hypertension No family hx of      Breast Cancer No family hx of      Ovarian Cancer No family hx of      Cerebrovascular Disease No family hx of      Thyroid Disease No family hx of        Current Outpatient Medications   Medication Sig Dispense Refill     albuterol (PROAIR HFA/PROVENTIL HFA/VENTOLIN HFA) 108 (90 Base) MCG/ACT inhaler Inhale 2 puffs into the lungs every 6 hours 1 Inhaler 1     fexofenadine (ALLEGRA) 180 MG tablet Take 180 mg by mouth daily       Prenatal Vit-Fe Fumarate-FA (PRENATAL MULTIVITAMIN W/IRON) 27-0.8 MG tablet Take 1 tablet by mouth every morning        Probiotic Product ([employer] PROBIOTIC) CHEW        etonogestrel-ethinyl estradiol (NUVARING) 0.12-0.015 MG/24HR vaginal ring Place 1 each vaginally every 28 days (Patient not taking: Reported on 3/29/2021) 9 each 3        Allergies  No Known Allergies      Physical Exam:   /85 (BP Location: Right arm)   Pulse 78   Temp 98.4  F (36.9  C) (Oral)   Resp 14   Ht 1.518 m (4' 11.75\")   Wt 90.2 kg (198 lb 12.8 oz)   LMP 2021 (Exact Date)   SpO2 98%   BMI 39.15 kg/m    General appearance: Alert and oriented, no acute distress, well " groomed  Breasts: Right breast with purulent discharge at site of previous nipple piercing. There is slightly blood tinged discharge. Some firmness in the area but difficult to tell because it is at the base of the nipple. No surrounding erythema or other mass  ENT: no palpable neck masses or thyromegaly appreciated, no lymph nodes palpable  CV: regular rate, regular rhythm, no murmurs appreciated   Resp: Lungs clear to auscultation bilaterally. Normal respiratory effort.  GI: Abdomen soft, non-tender, non-distended. No palpable masses  Skin: nipple as above.  Psych: alert and oriented, appropriate affect, +anxiety  Lymph: No palpable lymphadenopathy in the neck, supraclavicular region, groin  Genitourinary:  External: anatomically normal appearing labia majora, minora, and clitoral olson. No lesions noted  Vagina:  mucosa without lesions  Cervix: consistent in appearance with prior LEEP procedure, healing with granulation tissue. No lesions noted. Bimanual exam with normal feeling cervix without palpable mass  Bimanual exam: uterus small, mobile, no adnexal masses noted      Labs/Pathology:   Patient Name: DEJAN ROBERT   MR#: [id number]   Specimen #: [id number]   Collected: 3/15/2021   Received: 3/16/2021   Reported: 3/19/2021 12:42   Ordering Phy(s): CEPHAS MAWUENA AGBEH     For improved result formatting, select 'View Enhanced Report Format' under    Linked Documents section.     SPECIMEN(S):   A: LEEP biopsy   B: Endocervical curettings     FINAL DIAGNOSIS:     A: Cervix, LEEP conization:   - Invasive, well-differentiated, squamous cell carcinoma arising in   background moderate dysplasia; invasion   measured at 2.2 mm; lesion measures 5 mm.   -Endocervical margin involved by neoplasm   -Ectocervical margin negative   -Please see synoptic reporting template included in this report     B: Endocervix, curettage:   -High-grade squamous intraepithelial lesion present     COMMENT:   As this result is not  "anticipated, I did try and contact Dr Agbeh directly    before signing out the case but was   not able to reach him so a clinic team member placed an electronic alert   message for him to call me about this   patient. I will follow up with him, as needed, after this case has been   signed out.     Report Name: Uterine Cervix - Excision        Status: Submitted Checklist Inst: 1      Last Updated By: Sarah Cox M.D., 03/19/2021 12:41:45   Part(s) Involved:   A: LEEP biopsy     Synoptic Report:     SPECIMEN     Procedure:         - Loop electrical excision procedure (LEEP) / large loop excision of    the         transformation zone (LLETZ)     TUMOR     Tumor Size: 0.5 Centimeters (cm)     Histologic Type:         - Squamous cell carcinoma, HPV-associated     Histologic Grade:         - G1: Well differentiated     Depth of Stromal Invasion (Millimeters):         2.2 mm     Lymphovascular Invasion:         - Not identified     MARGINS     Margin Status for Invasive Carcinoma:         - Invasive carcinoma present at margin       Margin(s) Involved by Invasive Carcinoma:           - Endocervical     Margin Status for HSIL/AIS:         - High-grade squamous intraepithelial lesion (HSIL) present at    margin       Margin(s) Involved by HSIL:           - Endocervical     CAP eCC Agile Release Nov 2020     Electronically signed out by:     Sarah Cox M.D.     CLINICAL HISTORY:   Moderate dysplasia, EUGENE 2     GROSS:   The specimen is received in formalin with the proper patient   identification, labeled \"cervix\". The specimen   consists of a 1.9 x 1.8 x 1.4 cm annular mucosal covered tissue with a 0.8    cm os. The endocervical margin is   inked blue and the remaining radial margin is inked black. The specimen is    radially sectioned and entirely   submitted in 4 cassettes.     B. The specimen is received in formalin with the proper patient   identification, labeled \"post LEEP ECC\". The   specimen consists of a " brush collection device containing a 1.0 cm   aggregate of tan mucoid substance admixed   with clotted blood. The specimen is filtered and entirely submitted in one    cassette. (Dictated by: ZAIN Crenshaw 3/16/2021 02:55 PM)     MICROSCOPIC:   A. Examination shows areas of surface high-grade squamous intraepithelial   lesion consistent with moderate   dysplasia. Severe dysplasia and carcinoma in situ are not evident.   Unexpectedly, however, and present on   tissue sections from blocks A3 and A4, there is a bulbous, downward   projecting squamous proliferation which in   areas shows irregular islands of epithelial cells with one or two cells in    the stroma.There is an associated   chronic inflammatory reaction. On H&E examination, initially, the findings    are of concern for invasive   squamous cell carcinoma. This is confirmed on p16 stain which is positive.    The slides have been seen in   intradepartmental consultation with universal agreement. Invasion is   measured at 2.2 mm. The invasive process   and overlying dysplastic one involve the endocervical sample margin.     B. A formal microscopic examination has been performed. This material has   also been seen in intradepartmental   consultation. A p16 stain was performed and supports the findings given in    the final diagnosis.     The technical component of this testing was completed at the [facility], with the professional component performed    at the [facility]   [facility]atory, [address] [place] [address]  [phone] ([phone])         Patient Name: DEJAN ROBERT   MR#: [id number]   Specimen #: [id number]   Collected: 2/22/2021   Received: 2/24/2021   Reported: 2/25/2021 13:07   Ordering Phy(s): CEPHAS MAWUENA AGBEH     For improved result formatting, select 'View Enhanced Report Format' under    Linked Documents section.     SPECIMEN(S):   A: Endocervical  curettings   B: Cervical biopsy, 12 o'clock     FINAL DIAGNOSIS:   A: Endocervix, curettings:   - Negative for dysplasia or malignancy.     B: Cervix, 12:00, biopsy:   - High-grade squamous intraepithelial lesion (EUGENE 2).     Electronically signed out by:     Yemi Cherry M.D.     Imaging:   none    Assessment:  Heron Gee is a 35 year old  pre-menopausal female with newly diagnosed at least Stage 1A1 squamous cell carcinoma of the cervix with positive endocervical margin on LEEP.     Plan:   1. Cervical cancer: We extensively reviewed the pathophysiology of cervical dysplasia and cervical cancer. We reviewed that the size of the cancer determines the stage and the stage of the cancer determines the treatment. Because her cancerous lesion was at the margin of LEEP specimen we don't know if the entire lesion was removed. Thus, I would recommend cold knife cone to obtain negative margins and adequately stage her cancer. The difference in treatment between the stages was discussed. Specifically if Stage 1a1 lesion with negative LVSI (Lymphovascular space invasion) is confirmed (<3mm stromal invasion), for someone no longer desiring fertility, treatment would be with simple hysterectomy. For a Stage 8N9-3K7, the treatment would be a radical hysterectomy and lymph node assessment. We reviewed C procedure including risks of bleeding, injury to bladder, bowel, ureters, blood vessels. We discussed briefly the risks of definitive treatment, but will defer this to final stage. We discussed recommendation for 6 weeks from CKC to hysterectomy to decrease infection risk. We reviewed pathology results take 1-2 weeks and she would like me to contact her with results prior to iLive release.   2. Smoking: we discussed risks of HPV-persistence/cervical cancer and smoking. I strongly encouraged her to consider smoking cessation both to prevent further HPV related diseases as well as to prevent other negative  effects on her health. She will consider this.    3. Pre-operative exam:  I performed pre-op exam for CKC today. No labs needed. She may continue to use her medications up to the day of surgery. Avoid aspirin for 7 days prior to surgery. She may leave her Nuva-Ring in until surgery or she may take it out prior so that she has her menses before surgery.     I spent a total of 75 minutes with Heron Gee during today's office visit. Over 50% of this time was spent in face to face counseling regarding her diagnosis of cervical cancer as detailed in the plan above.     Fritz Barron MD    Division of Gynecologic Oncology  Department of Ob/Gyn and Women's Health  [facility]

## 2021-03-29 NOTE — NURSING NOTE
[facility]:  Patient Education Note    Relevant Diagnosis:  Cervical cancer    Teaching Topic:  Cold knife cone biopsy of cervix    Person(s) involved in teaching:  Patient and Mother    Motivation Level:    Asks Questions:   Yes  Eager to Learn:  Yes  Cooperative:  Yes  Receptive (willing/able to accept information):  Yes  Comments:  None    Patient demonstrates understanding of the following:  Reason for the appointment, diagnosis and treatment plan:  Yes  Knowledge of proper use of medications and conditions for which they are ordered (with special attention to potential side effects or drug interactions):  Yes  Which situations necessitate calling provider and whom to contact:  Yes    Teaching Concerns:  No    Education/Instructional Materials Used/Given:     Before Your Surgery Booklet ([place])  Showering Before Surgery, CH prep provided  Adult Pain Assessment Tool  Home Care After Gynecologic Surgery  After a Cone Biopsy  [facility] ([place])  Accommodations Brochure   Phone numbers for [facility] and After-Hours Line provided to patient    Pre-op tests:     EKG: not ordered    Labs: not ordered    Chest X-Ray: not ordered    COVID-19 Testing: ordered.    Other: N/A    Pre-op appointment: Dr. Barron will complete pre-op H&P exam.    Post-op appointment: Dr. Barron will call patient with her biopsy results, and to discuss further surgery planning (hysterectomy) at that time.    Time spent teaching with patient:  20 minutes    Surgery scheduling team at [facility] notified.  Per Dr. Barron, patient will sign consent the day of surgery.    Benson Merida, RN, BSN, OCN  Oncology Care Coordinator  [facility]

## 2021-03-29 NOTE — H&P (VIEW-ONLY)
Gynecologic Oncology Clinic - New Patient    Referring provider:    [place]  [address] [place] [address]    Patient: Heron Gee  : 1985    Date of Visit: Mar 29, 2021     Chief Complaint: cervix cancer    History of Present Illness:  Heron Gee is a 35 year old  pre-menopausal female with PMH notable for current everyday smoker who presents for newly diagnosed cervical cancer. This was diagnosed on LEEP after abnormal Pap smear and CIN2 on biopsy. See history below.     She has done well since LEEP. Expected bleeding. Prior to that no concerns. She has 2 children and has completed child bearing. Her fiance was planning to get a vasectomy.  She does note purulent discharge from right nipple. She has tried antibiotics and had recent breast ultrasound and mammogram which were normal.         Pap Smear History:  4/17/15: Pap - ASCUS. Repeat pap and HPV at 6 wk pp exam.   16: Pap - NIL.   4/15/19 ASCUS Pap, + HR HPV (Neg 16/18). Pt pregnant.  19 [place] visually normal; no biopsies taken.   10/28/19 NIL pap, + HR HPV (not 16 or 18).    19 [place] ECC - Negative. Plan cotest in 1 year.   20 Reminder MyChart - read  21 Reminder call - left msg  21 ASCUS pap, + HR HPV (not 16 or 18).   2021: Colposcopy: 12 o'clock biopsy EUGENE 2, ECC negative  3/15/2021: LEEP: Invasive squamous cell carcinoma of the cervix, 2.2mm depth of invasion, negative LVSI, positive endocervical margin      Review of Systems:  Gen: fatigue, hot flashes, insomnia  HEENT: sinus congestion and headaches  CV: negative  Resp: SOB  GI: diarrhea  : as per above  MSK:neg  Skin: +discharge from right breast  Neuro: tingling in hands  Psych: anxiety  Endocrine: negative    Past Medical History  Past Medical History:   Diagnosis Date     Abnormal Pap smear of cervix     4/17/15, 2019. 21     Cervical high risk HPV (human papillomavirus) test positive     04/15/2019,  "10/28/19, 21       Past Surgical History  Past Surgical History:   Procedure Laterality Date     APPENDECTOMY      as a child     deviated septum      teenager     GYN SURGERY  2019            OB/Gynecologic History:     Menarche 13 yr, Menses regular heavy, cramping  Nuva Ring with monthly menses  Received HPV vaccine series in late teens.    Social History  Social History     Tobacco Use     Smoking status: Current Every Day Smoker     Packs/day: 0.25     Types: Cigarettes     Smokeless tobacco: Never Used   Substance Use Topics     Alcohol use: Yes     Alcohol/week: 0.0 standard drinks     Comment: occ     Drug use: No    She lives with her significant other, Johnathan, and two children.     Family History  Family History   Problem Relation Age of Onset     Diabetes Brother      Hypertension No family hx of      Breast Cancer No family hx of      Ovarian Cancer No family hx of      Cerebrovascular Disease No family hx of      Thyroid Disease No family hx of        Current Outpatient Medications   Medication Sig Dispense Refill     albuterol (PROAIR HFA/PROVENTIL HFA/VENTOLIN HFA) 108 (90 Base) MCG/ACT inhaler Inhale 2 puffs into the lungs every 6 hours 1 Inhaler 1     fexofenadine (ALLEGRA) 180 MG tablet Take 180 mg by mouth daily       Prenatal Vit-Fe Fumarate-FA (PRENATAL MULTIVITAMIN W/IRON) 27-0.8 MG tablet Take 1 tablet by mouth every morning        Probiotic Product ([facility] PROBIOTIC) CHEW        etonogestrel-ethinyl estradiol (NUVARING) 0.12-0.015 MG/24HR vaginal ring Place 1 each vaginally every 28 days (Patient not taking: Reported on 3/29/2021) 9 each 3        Allergies  No Known Allergies      Physical Exam:   /85 (BP Location: Right arm)   Pulse 78   Temp 98.4  F (36.9  C) (Oral)   Resp 14   Ht 1.518 m (4' 11.75\")   Wt 90.2 kg (198 lb 12.8 oz)   LMP 2021 (Exact Date)   SpO2 98%   BMI 39.15 kg/m    General appearance: Alert and oriented, no acute distress, well " groomed  Breasts: Right breast with purulent discharge at site of previous nipple piercing. There is slightly blood tinged discharge. Some firmness in the area but difficult to tell because it is at the base of the nipple. No surrounding erythema or other mass  ENT: no palpable neck masses or thyromegaly appreciated, no lymph nodes palpable  CV: regular rate, regular rhythm, no murmurs appreciated   Resp: Lungs clear to auscultation bilaterally. Normal respiratory effort.  GI: Abdomen soft, non-tender, non-distended. No palpable masses  Skin: nipple as above.  Psych: alert and oriented, appropriate affect, +anxiety  Lymph: No palpable lymphadenopathy in the neck, supraclavicular region, groin  Genitourinary:  External: anatomically normal appearing labia majora, minora, and clitoral olson. No lesions noted  Vagina:  mucosa without lesions  Cervix: consistent in appearance with prior LEEP procedure, healing with granulation tissue. No lesions noted. Bimanual exam with normal feeling cervix without palpable mass  Bimanual exam: uterus small, mobile, no adnexal masses noted      Labs/Pathology:   Patient Name: DEJAN ROBERT   MR#: [id number]   Specimen #: [id number]   Collected: 3/15/2021   Received: 3/16/2021   Reported: 3/19/2021 12:42   Ordering Phy(s): CEPHAS MAWUENA AGBEH     For improved result formatting, select 'View Enhanced Report Format' under    Linked Documents section.     SPECIMEN(S):   A: LEEP biopsy   B: Endocervical curettings     FINAL DIAGNOSIS:     A: Cervix, LEEP conization:   - Invasive, well-differentiated, squamous cell carcinoma arising in   background moderate dysplasia; invasion   measured at 2.2 mm; lesion measures 5 mm.   -Endocervical margin involved by neoplasm   -Ectocervical margin negative   -Please see synoptic reporting template included in this report     B: Endocervix, curettage:   -High-grade squamous intraepithelial lesion present     COMMENT:   As this result is not  "anticipated, I did try and contact Dr Agbeh directly    before signing out the case but was   not able to reach him so a clinic team member placed an electronic alert   message for him to call me about this   patient. I will follow up with him, as needed, after this case has been   signed out.     Report Name: Uterine Cervix - Excision        Status: Submitted Checklist Inst: 1      Last Updated By: Sarah Cox M.D., 03/19/2021 12:41:45   Part(s) Involved:   A: LEEP biopsy     Synoptic Report:     SPECIMEN     Procedure:         - Loop electrical excision procedure (LEEP) / large loop excision of    the         transformation zone (LLETZ)     TUMOR     Tumor Size: 0.5 Centimeters (cm)     Histologic Type:         - Squamous cell carcinoma, HPV-associated     Histologic Grade:         - G1: Well differentiated     Depth of Stromal Invasion (Millimeters):         2.2 mm     Lymphovascular Invasion:         - Not identified     MARGINS     Margin Status for Invasive Carcinoma:         - Invasive carcinoma present at margin       Margin(s) Involved by Invasive Carcinoma:           - Endocervical     Margin Status for HSIL/AIS:         - High-grade squamous intraepithelial lesion (HSIL) present at    margin       Margin(s) Involved by HSIL:           - Endocervical     CAP eCC Agile Release Nov 2020     Electronically signed out by:     Sarah Cox M.D.     CLINICAL HISTORY:   Moderate dysplasia, EUGENE 2     GROSS:   The specimen is received in formalin with the proper patient   identification, labeled \"cervix\". The specimen   consists of a 1.9 x 1.8 x 1.4 cm annular mucosal covered tissue with a 0.8    cm os. The endocervical margin is   inked blue and the remaining radial margin is inked black. The specimen is    radially sectioned and entirely   submitted in 4 cassettes.     B. The specimen is received in formalin with the proper patient   identification, labeled \"post LEEP ECC\". The   specimen consists of a " brush collection device containing a 1.0 cm   aggregate of tan mucoid substance admixed   with clotted blood. The specimen is filtered and entirely submitted in one    cassette. (Dictated by: ZAIN Crenshaw 3/16/2021 02:55 PM)     MICROSCOPIC:   A. Examination shows areas of surface high-grade squamous intraepithelial   lesion consistent with moderate   dysplasia. Severe dysplasia and carcinoma in situ are not evident.   Unexpectedly, however, and present on   tissue sections from blocks A3 and A4, there is a bulbous, downward   projecting squamous proliferation which in   areas shows irregular islands of epithelial cells with one or two cells in    the stroma.There is an associated   chronic inflammatory reaction. On H&E examination, initially, the findings    are of concern for invasive   squamous cell carcinoma. This is confirmed on p16 stain which is positive.    The slides have been seen in   intradepartmental consultation with universal agreement. Invasion is   measured at 2.2 mm. The invasive process   and overlying dysplastic one involve the endocervical sample margin.     B. A formal microscopic examination has been performed. This material has   also been seen in intradepartmental   consultation. A p16 stain was performed and supports the findings given in    the final diagnosis.     The technical component of this testing was completed at the [facility], with the professional component performed    at the [facility]   [facility], [address] [place] [address]  [phone] ([phone])         Patient Name: DEJAN ROBERT   MR#: [id number]   Specimen #: [id number]   Collected: 2/22/2021   Received: 2/24/2021   Reported: 2/25/2021 13:07   Ordering Phy(s): CEPHAS MAWUENA AGBEH     For improved result formatting, select 'View Enhanced Report Format' under    Linked Documents section.     SPECIMEN(S):   A: Endocervical  curettings   B: Cervical biopsy, 12 o'clock     FINAL DIAGNOSIS:   A: Endocervix, curettings:   - Negative for dysplasia or malignancy.     B: Cervix, 12:00, biopsy:   - High-grade squamous intraepithelial lesion (EUGENE 2).     Electronically signed out by:     Yemi Cherry M.D.     Imaging:   none    Assessment:  Heron Gee is a 35 year old  pre-menopausal female with newly diagnosed at least Stage 1A1 squamous cell carcinoma of the cervix with positive endocervical margin on LEEP.     Plan:   1. Cervical cancer: We extensively reviewed the pathophysiology of cervical dysplasia and cervical cancer. We reviewed that the size of the cancer determines the stage and the stage of the cancer determines the treatment. Because her cancerous lesion was at the margin of LEEP specimen we don't know if the entire lesion was removed. Thus, I would recommend cold knife cone to obtain negative margins and adequately stage her cancer. The difference in treatment between the stages was discussed. Specifically if Stage 1a1 lesion with negative LVSI (Lymphovascular space invasion) is confirmed (<3mm stromal invasion), for someone no longer desiring fertility, treatment would be with simple hysterectomy. For a Stage 0L5-3B2, the treatment would be a radical hysterectomy and lymph node assessment. We reviewed C procedure including risks of bleeding, injury to bladder, bowel, ureters, blood vessels. We discussed briefly the risks of definitive treatment, but will defer this to final stage. We discussed recommendation for 6 weeks from [facility] to hysterectomy to decrease infection risk. We reviewed pathology results take 1-2 weeks and she would like me to contact her with results prior to [employer] release.   2. Smoking: we discussed risks of HPV-persistence/cervical cancer and smoking. I strongly encouraged her to consider smoking cessation both to prevent further HPV related diseases as well as to prevent other negative  effects on her health. She will consider this.    3. Pre-operative exam:  I performed pre-op exam for [facility] today. No labs needed. She may continue to use her medications up to the day of surgery. Avoid aspirin for 7 days prior to surgery. She may leave her Nuva-Ring in until surgery or she may take it out prior so that she has her menses before surgery.     I spent a total of 75 minutes with Heron Gee during today's office visit. Over 50% of this time was spent in face to face counseling regarding her diagnosis of cervical cancer as detailed in the plan above.     Fritz Barron MD    Division of Gynecologic Oncology  Department of Ob/Gyn and Women's Health  [facility]

## 2021-03-29 NOTE — LETTER
3/29/2021         RE: Heron Gee  [address]  [place] [address]        Dear Colleague,    Thank you for referring your patient, Heron Gee, to the [facility]. Please see a copy of my visit note below.    Gynecologic Oncology Clinic - New Patient    Referring provider:    [place]  [address] [place] [address]    Patient: Heron Gee  : 1985    Date of Visit: Mar 29, 2021     Chief Complaint: cervix cancer    History of Present Illness:  Heron Gee is a 35 year old  pre-menopausal female with PMH notable for current everyday smoker who presents for newly diagnosed cervical cancer. This was diagnosed on LEEP after abnormal Pap smear and CIN2 on biopsy. See history below.     She has done well since LEEP. Expected bleeding. Prior to that no concerns. She has 2 children and has completed child bearing. Her fiance was planning to get a vasectomy.  She does note purulent discharge from right nipple. She has tried antibiotics and had recent breast ultrasound and mammogram which were normal.         Pap Smear History:  4/17/15: Pap - ASCUS. Repeat pap and HPV at 6 wk pp exam.   16: Pap - NIL.   4/15/19 ASCUS Pap, + HR HPV (Neg 16/18). Pt pregnant.  19 [place] visually normal; no biopsies taken.   10/28/19 NIL pap, + HR HPV (not 16 or 18).    19 [place] ECC - Negative. Plan cotest in 1 year.   20 Reminder MyChart - read  21 Reminder call - left msg  21 ASCUS pap, + HR HPV (not 16 or 18).   2021: Colposcopy: 12 o'clock biopsy EUGENE 2, ECC negative  3/15/2021: LEEP: Invasive squamous cell carcinoma of the cervix, 2.2mm depth of invasion, negative LVSI, positive endocervical margin      Review of Systems:  Gen: fatigue, hot flashes, insomnia  HEENT: sinus congestion and headaches  CV: negative  Resp: SOB  GI: diarrhea  : as per above  MSK:neg  Skin: +discharge from right  breast  Neuro: tingling in hands  Psych: anxiety  Endocrine: negative    Past Medical History  Past Medical History:   Diagnosis Date     Abnormal Pap smear of cervix     4/17/15, . 21     Cervical high risk HPV (human papillomavirus) test positive     04/15/2019, 10/28/19, 21       Past Surgical History  Past Surgical History:   Procedure Laterality Date     APPENDECTOMY      as a child     deviated septum      teenager     GYN SURGERY  2019            OB/Gynecologic History:     Menarche 13 yr, Menses regular heavy, cramping  Nuva Ring with monthly menses  Received HPV vaccine series in late teens.    Social History  Social History     Tobacco Use     Smoking status: Current Every Day Smoker     Packs/day: 0.25     Types: Cigarettes     Smokeless tobacco: Never Used   Substance Use Topics     Alcohol use: Yes     Alcohol/week: 0.0 standard drinks     Comment: occ     Drug use: No    She lives with her significant other, Johnathan, and two children.     Family History  Family History   Problem Relation Age of Onset     Diabetes Brother      Hypertension No family hx of      Breast Cancer No family hx of      Ovarian Cancer No family hx of      Cerebrovascular Disease No family hx of      Thyroid Disease No family hx of        Current Outpatient Medications   Medication Sig Dispense Refill     albuterol (PROAIR HFA/PROVENTIL HFA/VENTOLIN HFA) 108 (90 Base) MCG/ACT inhaler Inhale 2 puffs into the lungs every 6 hours 1 Inhaler 1     fexofenadine (ALLEGRA) 180 MG tablet Take 180 mg by mouth daily       Prenatal Vit-Fe Fumarate-FA (PRENATAL MULTIVITAMIN W/IRON) 27-0.8 MG tablet Take 1 tablet by mouth every morning        Probiotic Product ([employer] PROBIOTIC) CHEW        etonogestrel-ethinyl estradiol (NUVARING) 0.12-0.015 MG/24HR vaginal ring Place 1 each vaginally every 28 days (Patient not taking: Reported on 3/29/2021) 9 each 3        Allergies  No Known Allergies      Physical Exam:   BP  "115/85 (BP Location: Right arm)   Pulse 78   Temp 98.4  F (36.9  C) (Oral)   Resp 14   Ht 1.518 m (4' 11.75\")   Wt 90.2 kg (198 lb 12.8 oz)   LMP 03/08/2021 (Exact Date)   SpO2 98%   BMI 39.15 kg/m    General appearance: Alert and oriented, no acute distress, well groomed  Breasts: Right breast with purulent discharge at site of previous nipple piercing. There is slightly blood tinged discharge. Some firmness in the area but difficult to tell because it is at the base of the nipple. No surrounding erythema or other mass  ENT: no palpable neck masses or thyromegaly appreciated, no lymph nodes palpable  CV: regular rate, regular rhythm, no murmurs appreciated   Resp: Lungs clear to auscultation bilaterally. Normal respiratory effort.  GI: Abdomen soft, non-tender, non-distended. No palpable masses  Skin: nipple as above.  Psych: alert and oriented, appropriate affect, +anxiety  Lymph: No palpable lymphadenopathy in the neck, supraclavicular region, groin  Genitourinary:  External: anatomically normal appearing labia majora, minora, and clitoral olson. No lesions noted  Vagina:  mucosa without lesions  Cervix: consistent in appearance with prior LEEP procedure, healing with granulation tissue. No lesions noted. Bimanual exam with normal feeling cervix without palpable mass  Bimanual exam: uterus small, mobile, no adnexal masses noted      Labs/Pathology:   Patient Name: DEJAN ROBERT   MR#: [id number]   Specimen #: [id number]   Collected: 3/15/2021   Received: 3/16/2021   Reported: 3/19/2021 12:42   Ordering Phy(s): CEPHAS MAWUENA AGBEH     For improved result formatting, select 'View Enhanced Report Format' under    Linked Documents section.     SPECIMEN(S):   A: LEEP biopsy   B: Endocervical curettings     FINAL DIAGNOSIS:     A: Cervix, LEEP conization:   - Invasive, well-differentiated, squamous cell carcinoma arising in   background moderate dysplasia; invasion   measured at 2.2 mm; lesion measures 5 " "mm.   -Endocervical margin involved by neoplasm   -Ectocervical margin negative   -Please see synoptic reporting template included in this report     B: Endocervix, curettage:   -High-grade squamous intraepithelial lesion present     COMMENT:   As this result is not anticipated, I did try and contact Dr Agbeh directly    before signing out the case but was   not able to reach him so a clinic team member placed an electronic alert   message for him to call me about this   patient. I will follow up with him, as needed, after this case has been   signed out.     Report Name: Uterine Cervix - Excision        Status: Submitted Checklist Inst: 1      Last Updated By: Sarah Cox M.D., 03/19/2021 12:41:45   Part(s) Involved:   A: LEEP biopsy     Synoptic Report:     SPECIMEN     Procedure:         - Loop electrical excision procedure (LEEP) / large loop excision of    the         transformation zone (LLETZ)     TUMOR     Tumor Size: 0.5 Centimeters (cm)     Histologic Type:         - Squamous cell carcinoma, HPV-associated     Histologic Grade:         - G1: Well differentiated     Depth of Stromal Invasion (Millimeters):         2.2 mm     Lymphovascular Invasion:         - Not identified     MARGINS     Margin Status for Invasive Carcinoma:         - Invasive carcinoma present at margin       Margin(s) Involved by Invasive Carcinoma:           - Endocervical     Margin Status for HSIL/AIS:         - High-grade squamous intraepithelial lesion (HSIL) present at    margin       Margin(s) Involved by HSIL:           - Endocervical     CAP eCC Agile Release Nov 2020     Electronically signed out by:     Sarah Cox M.D.     CLINICAL HISTORY:   Moderate dysplasia, EUGENE 2     GROSS:   The specimen is received in formalin with the proper patient   identification, labeled \"cervix\". The specimen   consists of a 1.9 x 1.8 x 1.4 cm annular mucosal covered tissue with a 0.8    cm os. The endocervical margin is   inked blue " "and the remaining radial margin is inked black. The specimen is    radially sectioned and entirely   submitted in 4 cassettes.     B. The specimen is received in formalin with the proper patient   identification, labeled \"post LEEP ECC\". The   specimen consists of a brush collection device containing a 1.0 cm   aggregate of tan mucoid substance admixed   with clotted blood. The specimen is filtered and entirely submitted in one    cassette. (Dictated by: ZAIN Crenshaw 3/16/2021 02:55 PM)     MICROSCOPIC:   A. Examination shows areas of surface high-grade squamous intraepithelial   lesion consistent with moderate   dysplasia. Severe dysplasia and carcinoma in situ are not evident.   Unexpectedly, however, and present on   tissue sections from blocks A3 and A4, there is a bulbous, downward   projecting squamous proliferation which in   areas shows irregular islands of epithelial cells with one or two cells in    the stroma.There is an associated   chronic inflammatory reaction. On H&E examination, initially, the findings    are of concern for invasive   squamous cell carcinoma. This is confirmed on p16 stain which is positive.    The slides have been seen in   intradepartmental consultation with universal agreement. Invasion is   measured at 2.2 mm. The invasive process   and overlying dysplastic one involve the endocervical sample margin.     B. A formal microscopic examination has been performed. This material has   also been seen in intradepartmental   consultation. A p16 stain was performed and supports the findings given in    the final diagnosis.     The technical component of this testing was completed at the [facility], with the professional component performed    at the [facility]   [facility]atory, [address] [place] [address]  [phone] ([phone])         Patient Name: DEJAN ROBERT   MR#: [id number]   Specimen " #: [id number]   Collected: 2021   Received: 2021   Reported: 2021 13:07   Ordering Phy(s): CEPHAS MAWUENA AGBEH     For improved result formatting, select 'View Enhanced Report Format' under    Linked Documents section.     SPECIMEN(S):   A: Endocervical curettings   B: Cervical biopsy, 12 o'clock     FINAL DIAGNOSIS:   A: Endocervix, curettings:   - Negative for dysplasia or malignancy.     B: Cervix, 12:00, biopsy:   - High-grade squamous intraepithelial lesion (EUGENE 2).     Electronically signed out by:     Yemi Cherry M.D.     Imaging:   none    Assessment:  Heron Gee is a 35 year old  pre-menopausal female with newly diagnosed at least Stage 1A1 squamous cell carcinoma of the cervix with positive endocervical margin on LEEP.     Plan:   1. Cervical cancer: We extensively reviewed the pathophysiology of cervical dysplasia and cervical cancer. We reviewed that the size of the cancer determines the stage and the stage of the cancer determines the treatment. Because her cancerous lesion was at the margin of LEEP specimen we don't know if the entire lesion was removed. Thus, I would recommend cold knife cone to obtain negative margins and adequately stage her cancer. The difference in treatment between the stages was discussed. Specifically if Stage 1a1 lesion with negative LVSI (Lymphovascular space invasion) is confirmed (<3mm stromal invasion), for someone no longer desiring fertility, treatment would be with simple hysterectomy. For a Stage 5H8-7Q7, the treatment would be a radical hysterectomy and lymph node assessment. We reviewed [facility] procedure including risks of bleeding, injury to bladder, bowel, ureters, blood vessels. We discussed briefly the risks of definitive treatment, but will defer this to final stage. We discussed recommendation for 6 weeks from CKC to hysterectomy to decrease infection risk. We reviewed pathology results take 1-2 weeks and she would like me to  contact her with results prior to MyChart release.   2. Smoking: we discussed risks of HPV-persistence/cervical cancer and smoking. I strongly encouraged her to consider smoking cessation both to prevent further HPV related diseases as well as to prevent other negative effects on her health. She will consider this.    3. Pre-operative exam:  I performed pre-op exam for CKC today. No labs needed. She may continue to use her medications up to the day of surgery. Avoid aspirin for 7 days prior to surgery. She may leave her Nuva-Ring in until surgery or she may take it out prior so that she has her menses before surgery.     I spent a total of 75 minutes with Heron Gee during today's office visit. Over 50% of this time was spent in face to face counseling regarding her diagnosis of cervical cancer as detailed in the plan above.     Fritz Barron MD    Division of Gynecologic Oncology  Department of Ob/Gyn and Women's Health  [facility]       Again, thank you for allowing me to participate in the care of your patient.        Sincerely,        Fritz Barron MD

## 2021-04-02 PROBLEM — C53.8 MALIGNANT NEOPLASM OF OVERLAPPING SITES OF CERVIX (H): Status: ACTIVE | Noted: 2021-04-02

## 2021-04-05 ENCOUNTER — TELEPHONE (OUTPATIENT)
Dept: ONCOLOGY | Facility: CLINIC | Age: 36
End: 2021-04-05

## 2021-04-05 DIAGNOSIS — Z11.59 ENCOUNTER FOR SCREENING FOR OTHER VIRAL DISEASES: ICD-10-CM

## 2021-04-05 NOTE — TELEPHONE ENCOUNTER
Surgery is scheduled with Dr. Barron on 4/15 at the [facility] .  Scheduled per availability.    H&P: to be completed by Surgeon    Additional appointments:   NO ADDITIONAL APPOINTMENTS NEEDED AT THIS TIME    COVID-19 test: 4/11 at [place]    Post-op: will be scheduled by the clinic.     The RN completed the education regarding the surgery.     Patient will receive a phone call from pre-admission nurses 1-2 days prior to surgery with arrival and start time.    The surgery packet was provided by the RN during appointment.    Patient will complete COVID-19 test that was scheduled by surgical coordinator 2-4 days prior to surgery.     I called the patient and was able to confirm the scheduled information above.

## 2021-04-11 DIAGNOSIS — Z11.59 ENCOUNTER FOR SCREENING FOR OTHER VIRAL DISEASES: ICD-10-CM

## 2021-04-11 LAB
SARS-COV-2 RNA RESP QL NAA+PROBE: NORMAL
SPECIMEN SOURCE: NORMAL

## 2021-04-11 PROCEDURE — U0003 INFECTIOUS AGENT DETECTION BY NUCLEIC ACID (DNA OR RNA); SEVERE ACUTE RESPIRATORY SYNDROME CORONAVIRUS 2 (SARS-COV-2) (CORONAVIRUS DISEASE [COVID-19]), AMPLIFIED PROBE TECHNIQUE, MAKING USE OF HIGH THROUGHPUT TECHNOLOGIES AS DESCRIBED BY CMS-2020-01-R: HCPCS | Performed by: OBSTETRICS & GYNECOLOGY

## 2021-04-11 PROCEDURE — U0005 INFEC AGEN DETEC AMPLI PROBE: HCPCS | Performed by: OBSTETRICS & GYNECOLOGY

## 2021-04-12 LAB
LABORATORY COMMENT REPORT: NORMAL
SARS-COV-2 RNA RESP QL NAA+PROBE: NEGATIVE
SPECIMEN SOURCE: NORMAL

## 2021-04-14 ENCOUNTER — ANESTHESIA EVENT (OUTPATIENT)
Dept: SURGERY | Facility: AMBULATORY SURGERY CENTER | Age: 36
End: 2021-04-14

## 2021-04-15 ENCOUNTER — HOSPITAL ENCOUNTER (OUTPATIENT)
Facility: AMBULATORY SURGERY CENTER | Age: 36
Discharge: HOME | End: 2021-04-15
Attending: OBSTETRICS & GYNECOLOGY | Admitting: OBSTETRICS & GYNECOLOGY
Payer: COMMERCIAL

## 2021-04-15 ENCOUNTER — ANESTHESIA (OUTPATIENT)
Dept: SURGERY | Facility: AMBULATORY SURGERY CENTER | Age: 36
End: 2021-04-15

## 2021-04-15 VITALS
DIASTOLIC BLOOD PRESSURE: 65 MMHG | SYSTOLIC BLOOD PRESSURE: 113 MMHG | TEMPERATURE: 96.9 F | OXYGEN SATURATION: 96 % | RESPIRATION RATE: 16 BRPM

## 2021-04-15 DIAGNOSIS — C53.8 MALIGNANT NEOPLASM OF OVERLAPPING SITES OF CERVIX (H): Primary | ICD-10-CM

## 2021-04-15 DIAGNOSIS — C53.8 MALIGNANT NEOPLASM OF OVERLAPPING SITES OF CERVIX (H): ICD-10-CM

## 2021-04-15 LAB — HCG UR QL: NEGATIVE

## 2021-04-15 PROCEDURE — 57520 CONIZATION OF CERVIX: CPT

## 2021-04-15 PROCEDURE — G8918 PT W/O PREOP ORDER IV AB PRO: HCPCS

## 2021-04-15 PROCEDURE — G8907 PT DOC NO EVENTS ON DISCHARG: HCPCS

## 2021-04-15 PROCEDURE — 88307 TISSUE EXAM BY PATHOLOGIST: CPT | Performed by: PATHOLOGY

## 2021-04-15 PROCEDURE — 81025 URINE PREGNANCY TEST: CPT | Performed by: OBSTETRICS & GYNECOLOGY

## 2021-04-15 PROCEDURE — 88305 TISSUE EXAM BY PATHOLOGIST: CPT | Performed by: PATHOLOGY

## 2021-04-15 RX ORDER — ACETAMINOPHEN 325 MG/1
975 TABLET ORAL ONCE
Status: COMPLETED | OUTPATIENT
Start: 2021-04-15 | End: 2021-04-15

## 2021-04-15 RX ORDER — LIDOCAINE HYDROCHLORIDE AND EPINEPHRINE 10; 10 MG/ML; UG/ML
INJECTION, SOLUTION INFILTRATION; PERINEURAL PRN
Status: DISCONTINUED | OUTPATIENT
Start: 2021-04-15 | End: 2021-04-15 | Stop reason: HOSPADM

## 2021-04-15 RX ORDER — NALOXONE HYDROCHLORIDE 0.4 MG/ML
0.2 INJECTION, SOLUTION INTRAMUSCULAR; INTRAVENOUS; SUBCUTANEOUS
Status: DISCONTINUED | OUTPATIENT
Start: 2021-04-15 | End: 2021-04-16 | Stop reason: HOSPADM

## 2021-04-15 RX ORDER — KETOROLAC TROMETHAMINE 30 MG/ML
30 INJECTION, SOLUTION INTRAMUSCULAR; INTRAVENOUS EVERY 6 HOURS PRN
Status: DISCONTINUED | OUTPATIENT
Start: 2021-04-15 | End: 2021-04-16 | Stop reason: HOSPADM

## 2021-04-15 RX ORDER — DEXAMETHASONE SODIUM PHOSPHATE 4 MG/ML
INJECTION, SOLUTION INTRA-ARTICULAR; INTRALESIONAL; INTRAMUSCULAR; INTRAVENOUS; SOFT TISSUE PRN
Status: DISCONTINUED | OUTPATIENT
Start: 2021-04-15 | End: 2021-04-15

## 2021-04-15 RX ORDER — DEXAMETHASONE SODIUM PHOSPHATE 4 MG/ML
4 INJECTION, SOLUTION INTRA-ARTICULAR; INTRALESIONAL; INTRAMUSCULAR; INTRAVENOUS; SOFT TISSUE EVERY 10 MIN PRN
Status: DISCONTINUED | OUTPATIENT
Start: 2021-04-15 | End: 2021-04-16 | Stop reason: HOSPADM

## 2021-04-15 RX ORDER — FERRIC SUBSULFATE 0.21 G/G
LIQUID TOPICAL PRN
Status: DISCONTINUED | OUTPATIENT
Start: 2021-04-15 | End: 2021-04-15 | Stop reason: HOSPADM

## 2021-04-15 RX ORDER — ACETAMINOPHEN 325 MG/1
975 TABLET ORAL ONCE
Status: DISCONTINUED | OUTPATIENT
Start: 2021-04-15 | End: 2021-04-16 | Stop reason: HOSPADM

## 2021-04-15 RX ORDER — OXYCODONE HYDROCHLORIDE 5 MG/1
5-10 TABLET ORAL EVERY 4 HOURS PRN
Status: DISCONTINUED | OUTPATIENT
Start: 2021-04-15 | End: 2021-04-16 | Stop reason: HOSPADM

## 2021-04-15 RX ORDER — IBUPROFEN 400 MG/1
800 TABLET, FILM COATED ORAL ONCE
Status: DISCONTINUED | OUTPATIENT
Start: 2021-04-15 | End: 2021-04-16 | Stop reason: HOSPADM

## 2021-04-15 RX ORDER — ONDANSETRON 4 MG/1
4 TABLET, ORALLY DISINTEGRATING ORAL EVERY 30 MIN PRN
Status: DISCONTINUED | OUTPATIENT
Start: 2021-04-15 | End: 2021-04-16 | Stop reason: HOSPADM

## 2021-04-15 RX ORDER — SODIUM CHLORIDE, SODIUM LACTATE, POTASSIUM CHLORIDE, CALCIUM CHLORIDE 600; 310; 30; 20 MG/100ML; MG/100ML; MG/100ML; MG/100ML
INJECTION, SOLUTION INTRAVENOUS CONTINUOUS
Status: DISCONTINUED | OUTPATIENT
Start: 2021-04-15 | End: 2021-04-16 | Stop reason: HOSPADM

## 2021-04-15 RX ORDER — PROPOFOL 10 MG/ML
INJECTION, EMULSION INTRAVENOUS CONTINUOUS PRN
Status: DISCONTINUED | OUTPATIENT
Start: 2021-04-15 | End: 2021-04-15

## 2021-04-15 RX ORDER — LIDOCAINE 40 MG/G
CREAM TOPICAL
Status: DISCONTINUED | OUTPATIENT
Start: 2021-04-15 | End: 2021-04-16 | Stop reason: HOSPADM

## 2021-04-15 RX ORDER — PROPOFOL 10 MG/ML
INJECTION, EMULSION INTRAVENOUS PRN
Status: DISCONTINUED | OUTPATIENT
Start: 2021-04-15 | End: 2021-04-15

## 2021-04-15 RX ORDER — MEPERIDINE HYDROCHLORIDE 25 MG/ML
12.5 INJECTION INTRAMUSCULAR; INTRAVENOUS; SUBCUTANEOUS
Status: DISCONTINUED | OUTPATIENT
Start: 2021-04-15 | End: 2021-04-16 | Stop reason: HOSPADM

## 2021-04-15 RX ORDER — ONDANSETRON 2 MG/ML
4 INJECTION INTRAMUSCULAR; INTRAVENOUS EVERY 30 MIN PRN
Status: DISCONTINUED | OUTPATIENT
Start: 2021-04-15 | End: 2021-04-16 | Stop reason: HOSPADM

## 2021-04-15 RX ORDER — ONDANSETRON 2 MG/ML
INJECTION INTRAMUSCULAR; INTRAVENOUS PRN
Status: DISCONTINUED | OUTPATIENT
Start: 2021-04-15 | End: 2021-04-15

## 2021-04-15 RX ORDER — IBUPROFEN 200 MG
TABLET ORAL
COMMUNITY
Start: 2021-04-15 | End: 2021-04-24

## 2021-04-15 RX ORDER — NALOXONE HYDROCHLORIDE 0.4 MG/ML
0.4 INJECTION, SOLUTION INTRAMUSCULAR; INTRAVENOUS; SUBCUTANEOUS
Status: DISCONTINUED | OUTPATIENT
Start: 2021-04-15 | End: 2021-04-16 | Stop reason: HOSPADM

## 2021-04-15 RX ORDER — FENTANYL CITRATE 50 UG/ML
25-50 INJECTION, SOLUTION INTRAMUSCULAR; INTRAVENOUS
Status: DISCONTINUED | OUTPATIENT
Start: 2021-04-15 | End: 2021-04-16 | Stop reason: HOSPADM

## 2021-04-15 RX ORDER — LABETALOL HYDROCHLORIDE 5 MG/ML
10 INJECTION, SOLUTION INTRAVENOUS
Status: DISCONTINUED | OUTPATIENT
Start: 2021-04-15 | End: 2021-04-16 | Stop reason: HOSPADM

## 2021-04-15 RX ORDER — ALBUTEROL SULFATE 0.83 MG/ML
2.5 SOLUTION RESPIRATORY (INHALATION) EVERY 4 HOURS PRN
Status: DISCONTINUED | OUTPATIENT
Start: 2021-04-15 | End: 2021-04-16 | Stop reason: HOSPADM

## 2021-04-15 RX ORDER — LIDOCAINE HYDROCHLORIDE 20 MG/ML
INJECTION, SOLUTION INFILTRATION; PERINEURAL PRN
Status: DISCONTINUED | OUTPATIENT
Start: 2021-04-15 | End: 2021-04-15

## 2021-04-15 RX ORDER — OXYCODONE HYDROCHLORIDE 5 MG/1
5 TABLET ORAL
Status: DISCONTINUED | OUTPATIENT
Start: 2021-04-15 | End: 2021-04-16 | Stop reason: HOSPADM

## 2021-04-15 RX ORDER — IODINE AND POTASSIUM IODIDE 50; 100 MG/ML; MG/ML
LIQUID ORAL PRN
Status: DISCONTINUED | OUTPATIENT
Start: 2021-04-15 | End: 2021-04-15 | Stop reason: HOSPADM

## 2021-04-15 RX ORDER — FENTANYL CITRATE 50 UG/ML
INJECTION, SOLUTION INTRAMUSCULAR; INTRAVENOUS PRN
Status: DISCONTINUED | OUTPATIENT
Start: 2021-04-15 | End: 2021-04-15

## 2021-04-15 RX ORDER — KETOROLAC TROMETHAMINE 30 MG/ML
INJECTION, SOLUTION INTRAMUSCULAR; INTRAVENOUS PRN
Status: DISCONTINUED | OUTPATIENT
Start: 2021-04-15 | End: 2021-04-15

## 2021-04-15 RX ADMIN — FENTANYL CITRATE 25 MCG: 50 INJECTION, SOLUTION INTRAMUSCULAR; INTRAVENOUS at 11:53

## 2021-04-15 RX ADMIN — PROPOFOL 40 MG: 10 INJECTION, EMULSION INTRAVENOUS at 11:48

## 2021-04-15 RX ADMIN — PROPOFOL 20 MG: 10 INJECTION, EMULSION INTRAVENOUS at 11:49

## 2021-04-15 RX ADMIN — LIDOCAINE HYDROCHLORIDE 100 MG: 20 INJECTION, SOLUTION INFILTRATION; PERINEURAL at 11:45

## 2021-04-15 RX ADMIN — FENTANYL CITRATE 25 MCG: 50 INJECTION, SOLUTION INTRAMUSCULAR; INTRAVENOUS at 12:03

## 2021-04-15 RX ADMIN — FENTANYL CITRATE 25 MCG: 50 INJECTION, SOLUTION INTRAMUSCULAR; INTRAVENOUS at 12:18

## 2021-04-15 RX ADMIN — DEXAMETHASONE SODIUM PHOSPHATE 4 MG: 4 INJECTION, SOLUTION INTRA-ARTICULAR; INTRALESIONAL; INTRAMUSCULAR; INTRAVENOUS; SOFT TISSUE at 11:53

## 2021-04-15 RX ADMIN — PROPOFOL 30 MG: 10 INJECTION, EMULSION INTRAVENOUS at 12:09

## 2021-04-15 RX ADMIN — ONDANSETRON 4 MG: 2 INJECTION INTRAMUSCULAR; INTRAVENOUS at 11:53

## 2021-04-15 RX ADMIN — ACETAMINOPHEN 975 MG: 325 TABLET ORAL at 11:41

## 2021-04-15 RX ADMIN — KETOROLAC TROMETHAMINE 30 MG: 30 INJECTION, SOLUTION INTRAMUSCULAR; INTRAVENOUS at 11:53

## 2021-04-15 RX ADMIN — SODIUM CHLORIDE, SODIUM LACTATE, POTASSIUM CHLORIDE, CALCIUM CHLORIDE: 600; 310; 30; 20 INJECTION, SOLUTION INTRAVENOUS at 11:46

## 2021-04-15 RX ADMIN — PROPOFOL 30 MG: 10 INJECTION, EMULSION INTRAVENOUS at 12:27

## 2021-04-15 RX ADMIN — PROPOFOL 200 MCG/KG/MIN: 10 INJECTION, EMULSION INTRAVENOUS at 11:47

## 2021-04-15 NOTE — INTERVAL H&P NOTE
I saw Heron Gee in person in the pre-operative area prior to surgery. We reviewed the procedure/plan, risks/benefits. She would like the pathology results held for 7 days release and I will call her with results.     Fritz Barron MD     Gynecologic Oncology

## 2021-04-15 NOTE — ANESTHESIA CARE TRANSFER NOTE
Patient: Heron Gee    Procedure(s):  Cold Knife Cone of the cervix    Diagnosis: Malignant neoplasm of overlapping sites of cervix (H) [C53.8]  Diagnosis Additional Information: No value filed.    Anesthesia Type:   No value filed.     Note:    Oropharynx: oropharynx clear of all foreign objects  Level of Consciousness: awake  Oxygen Supplementation: room air    Independent Airway: airway patency satisfactory and stable  Dentition: dentition unchanged  Vital Signs Stable: post-procedure vital signs reviewed and stable  Report to RN Given: handoff report given  Patient transferred to: Phase II    Handoff Report: Identifed the Patient, Identified the Reponsible Provider, Reviewed the pertinent medical history, Discussed the surgical course, Reviewed Intra-OP anesthesia mangement and issues during anesthesia, Set expectations for post-procedure period and Allowed opportunity for questions and acknowledgement of understanding      Vitals: (Last set prior to Anesthesia Care Transfer)  CRNA VITALS  4/15/2021 1200 - 4/15/2021 1235      4/15/2021             NIBP:  98/60    Pulse:  99    NIBP Mean:  71    SpO2:  96 %        Electronically Signed By: FELA Ramesh CRNA  April 15, 2021  12:35 PM

## 2021-04-15 NOTE — OP NOTE
Operative Note     Pre-operative diagnosis:   Invasive squamous cell carcinoma of the cervix, at least stage 1A1   EUGENE II    Post-operative diagnosis: same    Procedure(s):  Procedure(s):  Cold Knife Cone of the cervix  Endocervical curettage    Surgeon(s) and Role:     * Fritz Barron MD - Primary    Anesthesia:   MAC    UOP: not measured    EBL: 10mL    Drains: None    Specimen(s):  ID Type Source Tests Collected by Time Destination   A : cervial cone Stitch @ 1200 Tissue Cervix SURGICAL PATHOLOGY EXAM Fritz Barron MD 4/15/2021 12:09 PM    B : endocervical curettings Tissue Cervix SURGICAL PATHOLOGY EXAM Fritz Barron MD 4/15/2021 12:10 PM         Complications: none noted    Findings: cervix with evidence of prior excisional procedure. Prominent anterior lip. There are no obvious lesions or masses. The Posterior vagina has significant prolapse. After the procedure, minimal tissue on ECC. Hemostatic prior to conclusion.     Indication:   Heron Gee is a 36 year old  pre-menopausal female with tobacco use disorder who had ASCUS/HPV HR+ Pap smear. Colposcopy returned as EUGENE 2. LEEP showed focus of invasive carcinoma with DOI 2.2mm extending too the endocervical margin. LVSI was negative. She was counseled on recommendations and consented to the above procedure.    Description of Procedure:  The patient was brought to the operating room. Anesthesia as above was given. She was positioned in dorsal lithotomy with stirrups. Time out was performed. She was prepped and draped in the usual sterile fashion. Exam under anesthesia was as above. A speculum was placed and the cervix was visualized. Tenaculum was placed. Lugol's was applied to the cervix and upper vagina. No other areas of concern were identified. The face of the cervix was injected with lidocaine with epinepherine. Sutures were placed to aid in retraction. The transformation zone was excised. An endocervical curettage was then performed  above the cone bed. Cautery was used for hemostasis followed by Monsel's. The patient was awakened and taken to recovery in stable condition. All counts were correct prior to conclusion of the case.     Fritz Barron MD    Gynecologic Oncology

## 2021-04-15 NOTE — DISCHARGE INSTRUCTIONS
Gynecologic Discharge Instructions:  Light bleeding or spotting is normal after your procedure. You may have discharge that looks like coffee grounds, this is also normal. You may wear a pad as needed, do not use tampons or place anything within the vagina until cleared by your physician, or at a minimum 2-3 weeks after procedure. Any bleeding that is heavy (like a heavy menstrual period) or heavy enough to soak through more than one pad, you should call our clinic or be seen in the nearest emergency room.     For pain, we recommend that you take scheduled ibuprofen for the first 48 hours after your procedure. You may take ibuprofen 600mg every 6 hours or 800mg every 8 hours. Your discharge instructions should indicate when you last had this medication and when you can take your next dose. You should also take scheduled acetaminophen for the first 48 hrs. You may follow the instructions on the bottle. Do not exceed 4000mg of acetaminophen a day. If these medications are not enough to keep your pain at a level that allows you to do usual activities (within reason), you may take opioid pain medication if some was prescribed to you. If you were not prescribed opioid pain medication but your pain is not well controlled, please contact our office to discuss with a nurse.     You should avoid intense physical exercise particularly HIIT or cardio for the first few days after your procedure. If you are feeling up to it, you may resume moderate physical activity 2 days after your procedure. If you notice any increase in pain or bleeding, you should decrease activity.     The results from surgery generally take 1-2 weeks to return. These will be discussed at your post-operative appointment unless another plan has been made with your physician. Be aware that due to recent national legislation requiring immediate access to medical results, you may receive your results via [facility] before your physician has had the chance to  review them if you have not discussed this with the surgeon prior to surgery. We recognize that this can create stress or anxiety and lead to many questions. Your provider will discuss your results in detail with you at your post-operative appointment (unless another plan was made for results discussion before that time). Our clinic nurses and staff generally do not have any additional information about the results that they are able to share prior to that time.       [facility]  Nurse triage line: [phone]  Clinic scheduling: [phone]    For urgent or emergent concerns outside of clinic hours, either proceed to the nearest emergency room or contact the on-call physician by calling the [facility] at [phone] and asking to speak with the Ob/Gyn Resident Physician on call.       [facility]   Adult Discharge Orders & Instructions     For 24 hours after surgery    1. Get plenty of rest.  A responsible adult must stay with you for at least 24 hours after you leave the hospital.   2. Do not drive or use heavy equipment.  If you have weakness or tingling, don't drive or use heavy equipment until this feeling goes away.  3. Do not drink alcohol.  4. Avoid strenuous or risky activities.  Ask for help when climbing stairs.   5. You may feel lightheaded.  IF so, sit for a few minutes before standing.  Have someone help you get up.   6. If you have nausea (feel sick to your stomach): Drink only clear liquids such as apple juice, ginger ale, broth or 7-Up.  Rest may also help.  Be sure to drink enough fluids.  Move to a regular diet as you feel able.  7. You may have a slight fever. Call the doctor if your fever is over 100 F (37.7 C) (taken under the tongue) or lasts longer than 24 hours.  8. You may have a dry mouth, a sore throat, muscle aches or trouble sleeping.  These should go away after 24 hours.  9. Do not make important or legal  decisions.     Call your doctor for any of the followin.  Signs of infection (fever, growing tenderness at the surgery site, a large amount of drainage or bleeding, severe pain, foul-smelling drainage, redness, swelling).    2. It has been over 8 to 10 hours since surgery and you are still not able to urinate (pass water).    3.  Headache for over 24 hours.    4.  Numbness, tingling or weakness the day after surgery (if you had spinal anesthesia).                  5. Signs of Covid-19 infection (temperature over 100 degrees, shortness of breath, cough, loss of taste/smell, generalized body aches, persistent headache,                  chills, sore throat, nausea/vomiting/diarrhea).    **Acetaminophen (Tylenol)  975mg taken at 11:30am.     No Ibuprofen before 6:15 pm.

## 2021-04-15 NOTE — ANESTHESIA POSTPROCEDURE EVALUATION
Patient: Heron Gee    Procedure(s):  Cold Knife Cone of the cervix    Diagnosis:Malignant neoplasm of overlapping sites of cervix (H) [C53.8]  Diagnosis Additional Information: No value filed.    Anesthesia Type:  No value filed.    Note:  Disposition: Outpatient   Postop Pain Control: Uneventful            Sign Out: Well controlled pain   PONV: No   Neuro/Psych: Uneventful            Sign Out: Acceptable/Baseline neuro status   Airway/Respiratory: Uneventful            Sign Out: Acceptable/Baseline resp. status   CV/Hemodynamics: Uneventful            Sign Out: Acceptable CV status   Other NRE: NONE   DID A NON-ROUTINE EVENT OCCUR? No         Last vitals:  Vitals:    04/15/21 1234 04/15/21 1250 04/15/21 1303   BP: 107/58 116/65 113/65   Resp: 16 16 16   Temp: 36.1  C (96.9  F)     SpO2: 93% 95% 96%       Last vitals prior to Anesthesia Care Transfer:  CRNA VITALS  4/15/2021 1200 - 4/15/2021 1300      4/15/2021             NIBP:  98/60    Pulse:  99    NIBP Mean:  71    SpO2:  96 %          Electronically Signed By: Lv Bee MD  April 15, 2021  3:34 PM

## 2021-04-15 NOTE — ANESTHESIA PREPROCEDURE EVALUATION
Anesthesia Pre-Procedure Evaluation    Patient: Heron Gee   MRN: [id number] : 1985        Preoperative Diagnosis: Malignant neoplasm of overlapping sites of cervix (H) [C53.8]   Procedure : Procedure(s):  Cold Knife Cone of the cervix     Past Medical History:   Diagnosis Date     Abnormal Pap smear of cervix     4/17/15, . 21     Cervical high risk HPV (human papillomavirus) test positive     04/15/2019, 10/28/19, 21     Exercise-induced asthma       Past Surgical History:   Procedure Laterality Date     APPENDECTOMY OPEN CHILD        SECTION  09/04/2019    x 2     LEEP TX, CERVICAL       NOSE SURGERY      Deviated septum      No Known Allergies   Social History     Tobacco Use     Smoking status: Current Every Day Smoker     Packs/day: 0.50     Years: 13.00     Pack years: 6.50     Types: Cigarettes     Smokeless tobacco: Never Used   Substance Use Topics     Alcohol use: Yes     Frequency: Monthly or less      Wt Readings from Last 1 Encounters:   21 90.2 kg (198 lb 12.8 oz)        Anesthesia Evaluation            ROS/MED HX  ENT/Pulmonary:  - neg pulmonary ROS   (+) asthma     Neurologic:  - neg neurologic ROS     Cardiovascular:  - neg cardiovascular ROS     METS/Exercise Tolerance:     Hematologic:  - neg hematologic  ROS     Musculoskeletal:  - neg musculoskeletal ROS     GI/Hepatic:  - neg GI/hepatic ROS     Renal/Genitourinary:  - neg Renal ROS     Endo:  - neg endo ROS   (+) Obesity,     Psychiatric/Substance Use:  - neg psychiatric ROS     Infectious Disease:  - neg infectious disease ROS     Malignancy:  - neg malignancy ROS     Other:  - neg other ROS          Physical Exam    Airway  airway exam normal      Mallampati: II   TM distance: > 3 FB   Neck ROM: full   Mouth opening: > 3 cm    Respiratory Devices and Support         Dental  no notable dental history         Cardiovascular          Rhythm and rate: regular and normal     Pulmonary   pulmonary exam  normal        breath sounds clear to auscultation           OUTSIDE LABS:  CBC:   Lab Results   Component Value Date    WBC 13.4 (H) 01/29/2021    WBC 10.2 03/24/2019    HGB 14.6 01/29/2021    HGB 12.7 06/14/2019    HCT 43.7 01/29/2021    HCT 40.7 03/24/2019     01/29/2021     03/24/2019     BMP:   Lab Results   Component Value Date     01/29/2021     03/24/2019    POTASSIUM 3.5 01/29/2021    POTASSIUM 3.5 03/24/2019    CHLORIDE 108 01/29/2021    CHLORIDE 109 03/24/2019    CO2 25 01/29/2021    CO2 21 03/24/2019    BUN 16 01/29/2021    BUN 10 03/24/2019    CR 0.83 01/29/2021    CR 0.57 03/24/2019    GLC 84 01/29/2021     (H) 03/24/2019     COAGS: No results found for: PTT, INR, FIBR  POC:   Lab Results   Component Value Date    HCG Negative 04/15/2021     HEPATIC:   Lab Results   Component Value Date    ALBUMIN 3.7 01/29/2021    PROTTOTAL 7.6 01/29/2021    ALT 29 01/29/2021    AST 10 01/29/2021    ALKPHOS 67 01/29/2021    BILITOTAL 0.2 01/29/2021     OTHER:   Lab Results   Component Value Date    A1C 4.9 06/14/2019    URVASHI 9.0 01/29/2021    TSH 1.08 01/29/2021       Anesthesia Plan    ASA Status:  2   NPO Status:  NPO Appropriate    Anesthesia Type: MAC.     - Reason for MAC: immobility needed, straight local not clinically adequate   Induction: Intravenous.   Maintenance: TIVA.        Consents    Anesthesia Plan(s) and associated risks, benefits, and realistic alternatives discussed. Questions answered and patient/representative(s) expressed understanding.     - Discussed with:  Patient      - Extended Intubation/Ventilatory Support Discussed: No.      - Patient is DNR/DNI Status: No    Use of blood products discussed: No .     Postoperative Care    Pain management: IV analgesics, Oral pain medications, Multi-modal analgesia.   PONV prophylaxis: Ondansetron (or other 5HT-3), Dexamethasone or Solumedrol, Background Propofol Infusion     Comments:                Lv Bee,  MD

## 2021-04-16 ENCOUNTER — PATIENT OUTREACH (OUTPATIENT)
Dept: ONCOLOGY | Facility: CLINIC | Age: 36
End: 2021-04-16

## 2021-04-16 NOTE — PROGRESS NOTES
Post-Discharge Phone Call    Surgery Date:  04/15/2021    Description of Surgery:  Cold Knife Cone of the cervix; Endocervical curettage    Pain:  1) Location: Had some cramping yesterday, resolved now.  No pain or cramping today.    2) Rate pain on scale 1-10: 0/10  3) Is your pain well controlled on your pain medication?: N/A - no pain  4) How often are you taking your pain medication?: N/A    GI:  5) Last bowel movement: This morning  6) Are you having regular bowel movements?: Yes  7) Eating/drinking well?: Yes  8) Nausea?: No    Urinary:  9) Are you having problems or difficulty with urination?: No    Lower Extremities:  10) Were lymph nodes removed during surgery?: No  11) If yes, have you been offered a lymphedema consult appointment?: N/A  12) Any pain, redness, or swelling in legs?: No  13) Any area on your legs that are warm to touch?: No  14) Chest pain or severe shortness of breath?: No    Wound:  15) Type of incision: N/A - patient had cervical cone bx  Any of the following:     - Drainage (color, amount): Brown vaginal discharge yesterday, scant amount of vaginal bleeding on pad today.  - Odor: None  - Redness: N/A  - Chills: No  - Fever: No  16) Staples - Have you had your staples out yet? (staples should be removed 7-10 days post-op): N/A  17) Pt was reminded to wash incision (allow warm, soapy water to run over incision): N/A    Post-op:  18) Verify date and time of appointment: Dr. Barron is planning to call patient with her final biopsy results and to discuss further treatment planning based on the results.  19) Pt was informed that pathology will be discussed at this appointment: N/A    Any other questions or concerns at this time?: None    Benson Merida, RN, BSN, OCN  Oncology Care Coordinator  [facility]

## 2021-04-19 ENCOUNTER — TELEPHONE (OUTPATIENT)
Dept: ONCOLOGY | Facility: CLINIC | Age: 36
End: 2021-04-19

## 2021-04-19 DIAGNOSIS — C53.9 SQUAMOUS CELL CARCINOMA OF CERVIX, STAGE 1 (H): Primary | ICD-10-CM

## 2021-04-19 LAB — COPATH REPORT: NORMAL

## 2021-04-19 RX ORDER — HEPARIN SODIUM 10000 [USP'U]/ML
5000 INJECTION, SOLUTION INTRAVENOUS; SUBCUTANEOUS
Status: CANCELLED | OUTPATIENT
Start: 2021-04-19

## 2021-04-19 NOTE — TELEPHONE ENCOUNTER
Called Dejan Gee to review pathology results from [facility], which shows no evidence of residual cancer. 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 Based upon this, options for treatment include cone procedure (which she has already had), thus she could be considered adequately treated or simple hysterectomy with ovarian preservation. I would recommend we wait 6-8wks before performing hysterectomy procedure. She and her fiance are certain they don't want future pregnancies, as he had been planning to undergo vasectomy. Thus, she would prefer hysterectomy.     I will have scheduling contact her to set up surgery date in 8 weeks and then visit with me 1-2 weeks prior to review consent and answer any questions.    Fritz Barron MD     Gynecologic Oncology     Patient Name: DEJAN GEE   MR#: [id number]   Specimen #: [id number]   Collected: 4/15/2021   Received: 4/16/2021   Reported: 4/19/2021 10:41   Ordering Phy(s): FRITZ BARRON     For improved result formatting, select 'View Enhanced Report Format' under    Linked Documents section.     SPECIMEN(S):   A: Cervical cone   B: Endocervical curettings     FINAL DIAGNOSIS:   A. CERVIX, CONE:   - Cervical tissue with stromal scarring and inflammatory changes,   consistent with healing of a recent surgery   site   - Epithelial reactive changes   - Negative for residual dysplasia or neoplasia     B. ENDOCERVIX, CURETTAGE:   - Endocervical mucosal fragments with no significant histologic   abnormality   - Unremarkable lower uterine segment endometrium   - Negative for dysplasia or neoplasia     I have personally reviewed all specimens and/or slides, including the   listed special stains, and used them   with my medical judgement to determine or confirm the final diagnosis.     Electronically signed out by:     Kathleen Squires M.D., PhD, [facility]

## 2021-04-20 ENCOUNTER — TELEPHONE (OUTPATIENT)
Dept: ONCOLOGY | Facility: CLINIC | Age: 36
End: 2021-04-20

## 2021-04-20 PROBLEM — C53.9: Status: ACTIVE | Noted: 2021-04-20

## 2021-04-20 NOTE — TELEPHONE ENCOUNTER
Surgery is scheduled with Dr. Barron on 6/10 at the [facility] ([facility]) .  Scheduled per orders.    H&P: to be completed by PCP or Surgeon    Additional appointments:   Virtual consult on 5/25 at 3:30 PM    COVID-19 test: 6/6 at [facility]    Post-op: will be scheduled by the clinic.     The RN completed the education regarding the surgery.     Patient will receive a phone call from pre-admission nurses 1-2 days prior to surgery with arrival and start time.    The surgery packet was sent via US mail. and sent via VBOX.    Patient will complete COVID-19 test that was scheduled by surgical coordinator 2-4 days prior to surgery.     I called the patient and was able to confirm the scheduled information above.

## 2021-04-22 ENCOUNTER — PATIENT OUTREACH (OUTPATIENT)
Dept: OBGYN | Facility: CLINIC | Age: 36
End: 2021-04-22

## 2021-04-22 NOTE — TELEPHONE ENCOUNTER
3/15/21 LEEP- Invasive, well-differentiated, squamous cell carcinoma. Plan: Pt referred to GYN/ONC  3/19/21 Provider discussed LEEP results with pt over phone.   3/22/21 tracking discontinued. Telephone encounter sent to gyn/onc.  3/29/21 GYN/ONC appt  4/15/21 CONE with GYN/ONC- Negative for dysplasia.  6/10/21 Hyster with GYN/ONC

## 2021-05-16 DIAGNOSIS — Z11.59 ENCOUNTER FOR SCREENING FOR OTHER VIRAL DISEASES: Primary | ICD-10-CM

## 2021-05-25 ENCOUNTER — VIRTUAL VISIT (OUTPATIENT)
Dept: ONCOLOGY | Facility: CLINIC | Age: 36
End: 2021-05-25
Payer: COMMERCIAL

## 2021-05-25 VITALS — HEIGHT: 60 IN | BODY MASS INDEX: 38.87 KG/M2 | WEIGHT: 198 LBS

## 2021-05-25 DIAGNOSIS — C53.9 SQUAMOUS CELL CARCINOMA OF CERVIX, STAGE 1 (H): Primary | ICD-10-CM

## 2021-05-25 PROCEDURE — 99213 OFFICE O/P EST LOW 20 MIN: CPT | Mod: 24 | Performed by: OBSTETRICS & GYNECOLOGY

## 2021-05-25 ASSESSMENT — MIFFLIN-ST. JEOR: SCORE: 1515.65

## 2021-05-25 ASSESSMENT — PAIN SCALES - GENERAL: PAINLEVEL: NO PAIN (0)

## 2021-05-25 NOTE — PROGRESS NOTES
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     She is overall doing well. Did well after CKC procedure. No changes to health history. No concerns. Is sure she has completed childbearing.    Pap Smear History:  4/17/15: Pap - ASCUS. Repeat pap and HPV at 6 wk pp exam.   16: Pap - NIL.   4/15/19 ASCUS Pap, + HR HPV (Neg 16/18). Pt pregnant.  19 [place] visually normal; no biopsies taken.   10/28/19 NIL pap, + HR HPV (not 16 or 18).    19 [place] ECC - Negative. Plan cotest in 1 year.   20 Reminder MyChart - read  21 Reminder call - left msg  21 ASCUS pap, + HR HPV (not 16 or 18).   2021: Colposcopy: 12 o'clock biopsy EUGENE 2, ECC negative  3/15/2021: LEEP: Invasive squamous cell carcinoma of the cervix, 2.2mm depth of invasion, negative LVSI, positive endocervical margin  4/15/2021: CKC: no residual carcinoma, ECC negative    Review of Systems:  As per HPI, otherwise non-contributory.   Past Medical History  Past Medical History:   Diagnosis Date     Abnormal Pap smear of cervix     4/17/15, . 21     Cervical high risk HPV (human papillomavirus) test positive     04/15/2019, 10/28/19, 21     Exercise-induced asthma        Past Surgical History  Past Surgical History:   Procedure Laterality Date     APPENDECTOMY OPEN CHILD        SECTION  09/04/2019    x 2     COLPOSCOPY, CONIZATION, COMBINED N/A 4/15/2021    Procedure: Cold Knife Cone of the cervix;  Surgeon: Fritz Barron MD;  Location: [facility] OR     LEEP TX, CERVICAL    "    NOSE SURGERY      Deviated septum      OB/Gynecologic History:     Menarche 13 yr, Menses regular heavy, cramping  Nuva Ring with monthly menses  Received HPV vaccine series in late teens.    Social History  Social History     Tobacco Use     Smoking status: Current Every Day Smoker     Packs/day: 0.50     Years: 13.00     Pack years: 6.50     Types: Cigarettes     Smokeless tobacco: Never Used   Substance Use Topics     Alcohol use: Yes     Frequency: Monthly or less     Drug use: No       Family History  Family History   Problem Relation Age of Onset     Diabetes Brother      Cancer Paternal Great-Grandmother      Breast Cancer Paternal Great-Grandmother      Hypertension No family hx of      Ovarian Cancer No family hx of      Cerebrovascular Disease No family hx of      Thyroid Disease No family hx of        Current Outpatient Medications   Medication Sig Dispense Refill     albuterol (PROAIR HFA/PROVENTIL HFA/VENTOLIN HFA) 108 (90 Base) MCG/ACT inhaler Inhale 2 puffs into the lungs every 6 hours 1 Inhaler 1     fexofenadine (ALLEGRA) 180 MG tablet Take 180 mg by mouth daily       Prenatal Vit-Fe Fumarate-FA (PRENATAL MULTIVITAMIN W/IRON) 27-0.8 MG tablet Take 1 tablet by mouth every morning        Probiotic Product ([employer] PROBIOTIC) CHEW        etonogestrel-ethinyl estradiol (NUVARING) 0.12-0.015 MG/24HR vaginal ring Place 1 each vaginally every 28 days (Patient not taking: Reported on 2021) 9 each 3        Allergies  No Known Allergies      Physical Exam:   Ht 1.518 m (4' 11.75\")   Wt 89.8 kg (198 lb)   LMP 2021 (Approximate)   Breastfeeding No   BMI 38.99 kg/m    General appearance: Alert and oriented, no acute distress, well groomed    Labs:   Lab Results   Component Value Date    PATH  04/15/2021     Patient Name: DEJAN ROBERT  MR#: [id number]  Specimen #: [id number]  Collected: 4/15/2021  Received: 2021  Reported: 2021 10:41  Ordering Phy(s): JUAN COKER    For " "improved result formatting, select 'View Enhanced Report Format' under   Linked Documents section.    SPECIMEN(S):  A: Cervical cone  B: Endocervical curettings    FINAL DIAGNOSIS:  A. CERVIX, CONE:  - Cervical tissue with stromal scarring and inflammatory changes,   consistent with healing of a recent surgery  site  - Epithelial reactive changes  - Negative for residual dysplasia or neoplasia    B. ENDOCERVIX, CURETTAGE:  - Endocervical mucosal fragments with no significant histologic   abnormality  - Unremarkable lower uterine segment endometrium  - Negative for dysplasia or neoplasia    I have personally reviewed all specimens and/or slides, including the   listed special stains, and used them  with my medical judgement to determine or confirm the final diagnosis.    Electronically signed out by:    Kathleen Squires M.D., PhD, [facility]    CLINICAL HISTORY:  Patient with history of invasive squamous cell carcinoma in a LEEP    GROSS:  A:  The specimen is received in formalin with proper patient   identification, labeled \"cervical cone stitch at  12:00\".  The specimen consists of a 2.1 x 1.7 x 1.3 cm oriented cervical   cone. There is a stitch designating  12:00 per requisition form and specimen container. The ectocervix is   white-pink, smooth, and glistening with  no definite lesions identified. The endocervical margin is inked black and   the stroma margin is inked blue.  The endocervical canal is tan-pink, smooth, and glistening with no   definite masses identified. The specimen is  sectioned and entirely submitted.    Summary of Sections:  A1 - 12:00 to 3:00  A2-A3 - 3:00 to 6:00  A4 - 6:00 to 9:00  A5 - 9:00 to 12:00    B:  The specimen is received in formalin with proper patient   identification, labeled \"endocervical  curettings\".  The specimen consists of 1.5 x 1.2 x 0.1 cm aggregate of   white-tan soft tissue and brown mucous.  The specimen is filtered, wrapped, and entirely submitted in cassette B1. "   (Dictated by: Ivis Lerner  2021 11:22 AM)    MICROSCOPIC:  Microscopic examination was performed.    The technical component of this testing was completed at the [facility], with the professional component performed   at the [facility], [address] [place] [phone] ([phone])    CPT Codes:  A: 73573-RV7  B: 79577-RF7    COLLECTION SITE:  Client: 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     Plan:   1. We reviewed the plan for hysterectomy and fertility implications. She is sure she is done with childbearing. We discussed the plan to remove the cervix, uterus, both fallopian tubes, but to retain the ovaries. We reviewed risks including infection, injury to surrounding structures or organs, wound complications, need for additional procedures. We reviewed the post-op course and lifting restrictions. We discussed 2 week wait for pathology and review of those results at her post-operative appointment. We will also discuss curtis queen at that appointment.   2. All questions were answered. We will update CV and lung exam day of surgery. No further work-up necessary. Avoid Allegra morning of surgery if possible, due to possible hypertensive effects. She is no longer on NuvaRing so no concerns with that medication. Will need hCG pre-operatively.    Fritz Barron MD    Gynecologic Oncology

## 2021-05-25 NOTE — H&P (VIEW-ONLY)
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     She is overall doing well. Did well after CKC procedure. No changes to health history. No concerns. Is sure she has completed childbearing.    Pap Smear History:  4/17/15: Pap - ASCUS. Repeat pap and HPV at 6 wk pp exam.   16: Pap - NIL.   4/15/19 ASCUS Pap, + HR HPV (Neg 16/18). Pt pregnant.  19 [place] visually normal; no biopsies taken.   10/28/19 NIL pap, + HR HPV (not 16 or 18).    19 [place] ECC - Negative. Plan cotest in 1 year.   20 Reminder MyChart - read  21 Reminder call - left msg  21 ASCUS pap, + HR HPV (not 16 or 18).   2021: Colposcopy: 12 o'clock biopsy EUGENE 2, ECC negative  3/15/2021: LEEP: Invasive squamous cell carcinoma of the cervix, 2.2mm depth of invasion, negative LVSI, positive endocervical margin  4/15/2021: CKC: no residual carcinoma, ECC negative    Review of Systems:  As per HPI, otherwise non-contributory.   Past Medical History  Past Medical History:   Diagnosis Date     Abnormal Pap smear of cervix     4/17/15, . 21     Cervical high risk HPV (human papillomavirus) test positive     04/15/2019, 10/28/19, 21     Exercise-induced asthma        Past Surgical History  Past Surgical History:   Procedure Laterality Date     APPENDECTOMY OPEN CHILD        SECTION  09/04/2019    x 2     COLPOSCOPY, CONIZATION, COMBINED N/A 4/15/2021    Procedure: Cold Knife Cone of the cervix;  Surgeon: Fritz Barron MD;  Location: [facility]     LEEP TX, CERVICAL    "    NOSE SURGERY      Deviated septum      OB/Gynecologic History:     Menarche 13 yr, Menses regular heavy, cramping  Nuva Ring with monthly menses  Received HPV vaccine series in late teens.    Social History  Social History     Tobacco Use     Smoking status: Current Every Day Smoker     Packs/day: 0.50     Years: 13.00     Pack years: 6.50     Types: Cigarettes     Smokeless tobacco: Never Used   Substance Use Topics     Alcohol use: Yes     Frequency: Monthly or less     Drug use: No       Family History  Family History   Problem Relation Age of Onset     Diabetes Brother      Cancer Paternal Great-Grandmother      Breast Cancer Paternal Great-Grandmother      Hypertension No family hx of      Ovarian Cancer No family hx of      Cerebrovascular Disease No family hx of      Thyroid Disease No family hx of        Current Outpatient Medications   Medication Sig Dispense Refill     albuterol (PROAIR HFA/PROVENTIL HFA/VENTOLIN HFA) 108 (90 Base) MCG/ACT inhaler Inhale 2 puffs into the lungs every 6 hours 1 Inhaler 1     fexofenadine (ALLEGRA) 180 MG tablet Take 180 mg by mouth daily       Prenatal Vit-Fe Fumarate-FA (PRENATAL MULTIVITAMIN W/IRON) 27-0.8 MG tablet Take 1 tablet by mouth every morning        Probiotic Product ([employer] PROBIOTIC) CHEW        etonogestrel-ethinyl estradiol (NUVARING) 0.12-0.015 MG/24HR vaginal ring Place 1 each vaginally every 28 days (Patient not taking: Reported on 2021) 9 each 3        Allergies  No Known Allergies      Physical Exam:   Ht 1.518 m (4' 11.75\")   Wt 89.8 kg (198 lb)   LMP 2021 (Approximate)   Breastfeeding No   BMI 38.99 kg/m    General appearance: Alert and oriented, no acute distress, well groomed    Labs:   Lab Results   Component Value Date    PATH  04/15/2021     Patient Name: DEJAN ROBERT  MR#: [id number]  Specimen #: [id number]  Collected: 4/15/2021  Received: 2021  Reported: 2021 10:41  Ordering Phy(s): JUAN COKER    For " "improved result formatting, select 'View Enhanced Report Format' under   Linked Documents section.    SPECIMEN(S):  A: Cervical cone  B: Endocervical curettings    FINAL DIAGNOSIS:  A. CERVIX, CONE:  - Cervical tissue with stromal scarring and inflammatory changes,   consistent with healing of a recent surgery  site  - Epithelial reactive changes  - Negative for residual dysplasia or neoplasia    B. ENDOCERVIX, CURETTAGE:  - Endocervical mucosal fragments with no significant histologic   abnormality  - Unremarkable lower uterine segment endometrium  - Negative for dysplasia or neoplasia    I have personally reviewed all specimens and/or slides, including the   listed special stains, and used them  with my medical judgement to determine or confirm the final diagnosis.    Electronically signed out by:    Kathleen Squires M.D., PhD, [facility]    CLINICAL HISTORY:  Patient with history of invasive squamous cell carcinoma in a LEEP    GROSS:  A:  The specimen is received in formalin with proper patient   identification, labeled \"cervical cone stitch at  12:00\".  The specimen consists of a 2.1 x 1.7 x 1.3 cm oriented cervical   cone. There is a stitch designating  12:00 per requisition form and specimen container. The ectocervix is   white-pink, smooth, and glistening with  no definite lesions identified. The endocervical margin is inked black and   the stroma margin is inked blue.  The endocervical canal is tan-pink, smooth, and glistening with no   definite masses identified. The specimen is  sectioned and entirely submitted.    Summary of Sections:  A1 - 12:00 to 3:00  A2-A3 - 3:00 to 6:00  A4 - 6:00 to 9:00  A5 - 9:00 to 12:00    B:  The specimen is received in formalin with proper patient   identification, labeled \"endocervical  curettings\".  The specimen consists of 1.5 x 1.2 x 0.1 cm aggregate of   white-tan soft tissue and brown mucous.  The specimen is filtered, wrapped, and entirely submitted in cassette B1. "   (Dictated by: Ivis Lerner  2021 11:22 AM)    MICROSCOPIC:  Microscopic examination was performed.    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     Plan:   1. We reviewed the plan for hysterectomy and fertility implications. She is sure she is done with childbearing. We discussed the plan to remove the cervix, uterus, both fallopian tubes, but to retain the ovaries. We reviewed risks including infection, injury to surrounding structures or organs, wound complications, need for additional procedures. We reviewed the post-op course and lifting restrictions. We discussed 2 week wait for pathology and review of those results at her post-operative appointment. We will also discuss curtis queen at that appointment.   2. All questions were answered. We will update CV and lung exam day of surgery. No further work-up necessary. Avoid Allegra morning of surgery if possible, due to possible hypertensive effects. She is no longer on NuvaRing so no concerns with that medication. Will need hCG pre-operatively.    Fritz Barron MD    Gynecologic Oncology

## 2021-05-25 NOTE — LETTER
2021         RE: Heron Gee  [address] [address] [place] [facility] [address]        Dear Colleague,    Thank you for referring your patient, Heron Gee, to the [facility]. Please see a copy of my visit note below.    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     She is overall doing well. Did well after CKC procedure. No changes to health history. No concerns. Is sure she has completed childbearing.    Pap Smear History:  4/17/15: Pap - ASCUS. Repeat pap and HPV at 6 wk pp exam.   16: Pap - NIL.   4/15/19 ASCUS Pap, + HR HPV (Neg 16/18). Pt pregnant.  19 [place] visually normal; no biopsies taken.   10/28/19 NIL pap, + HR HPV (not 16 or 18).    19 [place] ECC - Negative. Plan cotest in 1 year.   20 Reminder MyChart - read  21 Reminder call - left msg  21 ASCUS pap, + HR HPV (not 16 or 18).   2021: Colposcopy: 12 o'clock biopsy EUGENE 2, ECC negative  3/15/2021: LEEP: Invasive squamous cell carcinoma of the cervix, 2.2mm depth of invasion, negative LVSI, positive endocervical margin  4/15/2021: CKC: no residual carcinoma, ECC negative    Review of Systems:  As per HPI, otherwise non-contributory.   Past Medical History  Past Medical History:   Diagnosis Date     Abnormal Pap smear of cervix     4/17/15, 2019. 21     Cervical high risk HPV (human papillomavirus) test positive     04/15/2019, 10/28/19, 21     Exercise-induced asthma        Past Surgical History  Past Surgical  "History:   Procedure Laterality Date     APPENDECTOMY OPEN CHILD        SECTION  09/04/2019    x 2     COLPOSCOPY, CONIZATION, COMBINED N/A 4/15/2021    Procedure: Cold Knife Cone of the cervix;  Surgeon: Fritz Barron MD;  Location: [facility] OR     LEEP TX, CERVICAL       NOSE SURGERY      Deviated septum      OB/Gynecologic History:     Menarche 13 yr, Menses regular heavy, cramping  Nuva Ring with monthly menses  Received HPV vaccine series in late teens.    Social History  Social History     Tobacco Use     Smoking status: Current Every Day Smoker     Packs/day: 0.50     Years: 13.00     Pack years: 6.50     Types: Cigarettes     Smokeless tobacco: Never Used   Substance Use Topics     Alcohol use: Yes     Frequency: Monthly or less     Drug use: No       Family History  Family History   Problem Relation Age of Onset     Diabetes Brother      Cancer Paternal Great-Grandmother      Breast Cancer Paternal Great-Grandmother      Hypertension No family hx of      Ovarian Cancer No family hx of      Cerebrovascular Disease No family hx of      Thyroid Disease No family hx of        Current Outpatient Medications   Medication Sig Dispense Refill     albuterol (PROAIR HFA/PROVENTIL HFA/VENTOLIN HFA) 108 (90 Base) MCG/ACT inhaler Inhale 2 puffs into the lungs every 6 hours 1 Inhaler 1     fexofenadine (ALLEGRA) 180 MG tablet Take 180 mg by mouth daily       Prenatal Vit-Fe Fumarate-FA (PRENATAL MULTIVITAMIN W/IRON) 27-0.8 MG tablet Take 1 tablet by mouth every morning        Probiotic Product ([employer] PROBIOTIC) CHEW        etonogestrel-ethinyl estradiol (NUVARING) 0.12-0.015 MG/24HR vaginal ring Place 1 each vaginally every 28 days (Patient not taking: Reported on 2021) 9 each 3        Allergies  No Known Allergies      Physical Exam:   Ht 1.518 m (4' 11.75\")   Wt 89.8 kg (198 lb)   LMP 2021 (Approximate)   Breastfeeding No   BMI 38.99 kg/m    General appearance: Alert and oriented, no acute " "distress, well groomed    Labs:   Lab Results   Component Value Date    PATH  04/15/2021     Patient Name: DEJAN ROBERT  MR#: [id number]  Specimen #: [id number]  Collected: 4/15/2021  Received: 4/16/2021  Reported: 4/19/2021 10:41  Ordering Phy(s): JUAN COKER    For improved result formatting, select 'View Enhanced Report Format' under   Linked Documents section.    SPECIMEN(S):  A: Cervical cone  B: Endocervical curettings    FINAL DIAGNOSIS:  A. CERVIX, CONE:  - Cervical tissue with stromal scarring and inflammatory changes,   consistent with healing of a recent surgery  site  - Epithelial reactive changes  - Negative for residual dysplasia or neoplasia    B. ENDOCERVIX, CURETTAGE:  - Endocervical mucosal fragments with no significant histologic   abnormality  - Unremarkable lower uterine segment endometrium  - Negative for dysplasia or neoplasia    I have personally reviewed all specimens and/or slides, including the   listed special stains, and used them  with my medical judgement to determine or confirm the final diagnosis.    Electronically signed out by:    Kathleen Squires M.D., PhD, [facility]    CLINICAL HISTORY:  Patient with history of invasive squamous cell carcinoma in a LEEP    GROSS:  A:  The specimen is received in formalin with proper patient   identification, labeled \"cervical cone stitch at  12:00\".  The specimen consists of a 2.1 x 1.7 x 1.3 cm oriented cervical   cone. There is a stitch designating  12:00 per requisition form and specimen container. The ectocervix is   white-pink, smooth, and glistening with  no definite lesions identified. The endocervical margin is inked black and   the stroma margin is inked blue.  The endocervical canal is tan-pink, smooth, and glistening with no   definite masses identified. The specimen is  sectioned and entirely submitted.    Summary of Sections:  A1 - 12:00 to 3:00  A2-A3 - 3:00 to 6:00  A4 - 6:00 to 9:00  A5 - 9:00 to 12:00    B:  The " "specimen is received in formalin with proper patient   identification, labeled \"endocervical  curettings\".  The specimen consists of 1.5 x 1.2 x 0.1 cm aggregate of   white-tan soft tissue and brown mucous.  The specimen is filtered, wrapped, and entirely submitted in cassette B1.   (Dictated by: Ivis Lerner  2021 11:22 AM)    MICROSCOPIC:  Microscopic examination was performed.    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     Plan:   1. We reviewed the plan for hysterectomy and fertility implications. She is sure she is done with childbearing. We discussed the plan to remove the cervix, uterus, both fallopian tubes, but to retain the ovaries. We reviewed risks including infection, injury to surrounding structures or organs, wound complications, need for additional procedures. We reviewed the post-op course and lifting restrictions. We discussed 2 week wait for pathology and review of those results at her post-operative appointment. We will also discuss curtis queen at that appointment.   2. All questions were answered. We will update CV and lung exam day of surgery. No further work-up necessary. Avoid Allegra morning of surgery if possible, due to possible hypertensive effects. She is no longer on NuvaRing so no concerns with that " medication. Will need hCG pre-operatively.    Fritz Barron MD    Gynecologic Oncology       Again, thank you for allowing me to participate in the care of your patient.        Sincerely,        Fritz Barron MD

## 2021-05-25 NOTE — NURSING NOTE
Heron is a 36 year old who is being evaluated via a billable video visit.      How would you like to obtain your AVS? MyChart  If the video visit is dropped, the invitation should be resent by: Text to cell phone: [phone]  Will anyone else be joining your video visit? No    Video-Visit Details    Type of service:  Video Visit      Originating Location (pt. Location): Home in [place]    Distant Location (provider location):  [facility]     Platform used for Video Visit: Well      SYMPTOM QUESTIONNAIRE    General: no    Head/Eyes: no    Ears/Nose/Throat: no    Cardiovascular: no    Respiratory: no    Gastrointestinal: no    Genitourinary: no    Sexual Function: no    Musculoskeletal: no    Skin: no    Neurological: no    Mental Health: no    Endocrine: no    Taty Resendez CMA

## 2021-05-28 ENCOUNTER — PATIENT OUTREACH (OUTPATIENT)
Dept: ONCOLOGY | Facility: CLINIC | Age: 36
End: 2021-05-28

## 2021-06-06 DIAGNOSIS — Z11.59 ENCOUNTER FOR SCREENING FOR OTHER VIRAL DISEASES: ICD-10-CM

## 2021-06-06 LAB
LABORATORY COMMENT REPORT: NORMAL
SARS-COV-2 RNA RESP QL NAA+PROBE: NEGATIVE
SARS-COV-2 RNA RESP QL NAA+PROBE: NORMAL
SPECIMEN SOURCE: NORMAL
SPECIMEN SOURCE: NORMAL

## 2021-06-06 PROCEDURE — U0003 INFECTIOUS AGENT DETECTION BY NUCLEIC ACID (DNA OR RNA); SEVERE ACUTE RESPIRATORY SYNDROME CORONAVIRUS 2 (SARS-COV-2) (CORONAVIRUS DISEASE [COVID-19]), AMPLIFIED PROBE TECHNIQUE, MAKING USE OF HIGH THROUGHPUT TECHNOLOGIES AS DESCRIBED BY CMS-2020-01-R: HCPCS | Performed by: OBSTETRICS & GYNECOLOGY

## 2021-06-06 PROCEDURE — U0005 INFEC AGEN DETEC AMPLI PROBE: HCPCS | Performed by: OBSTETRICS & GYNECOLOGY

## 2021-06-09 ENCOUNTER — ANESTHESIA EVENT (OUTPATIENT)
Dept: SURGERY | Facility: AMBULATORY SURGERY CENTER | Age: 36
End: 2021-06-09
Payer: COMMERCIAL

## 2021-06-10 ENCOUNTER — ANESTHESIA (OUTPATIENT)
Dept: SURGERY | Facility: AMBULATORY SURGERY CENTER | Age: 36
End: 2021-06-10
Payer: COMMERCIAL

## 2021-06-10 ENCOUNTER — HOSPITAL ENCOUNTER (OUTPATIENT)
Facility: AMBULATORY SURGERY CENTER | Age: 36
End: 2021-06-10
Attending: OBSTETRICS & GYNECOLOGY
Payer: COMMERCIAL

## 2021-06-10 VITALS
HEART RATE: 89 BPM | RESPIRATION RATE: 14 BRPM | SYSTOLIC BLOOD PRESSURE: 123 MMHG | WEIGHT: 190 LBS | HEIGHT: 60 IN | BODY MASS INDEX: 37.3 KG/M2 | DIASTOLIC BLOOD PRESSURE: 82 MMHG | TEMPERATURE: 97.3 F | OXYGEN SATURATION: 94 %

## 2021-06-10 DIAGNOSIS — C53.9 SQUAMOUS CELL CARCINOMA OF CERVIX, STAGE 1 (H): Primary | ICD-10-CM

## 2021-06-10 DIAGNOSIS — C53.9 SQUAMOUS CELL CARCINOMA OF CERVIX, STAGE 1 (H): ICD-10-CM

## 2021-06-10 LAB — HCG UR QL: NEGATIVE

## 2021-06-10 PROCEDURE — 58571 TLH W/T/O 250 G OR LESS: CPT

## 2021-06-10 PROCEDURE — 88309 TISSUE EXAM BY PATHOLOGIST: CPT | Performed by: PATHOLOGY

## 2021-06-10 RX ORDER — ACETAMINOPHEN 325 MG/1
975 TABLET ORAL ONCE
Status: DISCONTINUED | OUTPATIENT
Start: 2021-06-10 | End: 2021-06-11 | Stop reason: HOSPADM

## 2021-06-10 RX ORDER — KETAMINE HYDROCHLORIDE 10 MG/ML
INJECTION, SOLUTION INTRAMUSCULAR; INTRAVENOUS PRN
Status: DISCONTINUED | OUTPATIENT
Start: 2021-06-10 | End: 2021-06-10

## 2021-06-10 RX ORDER — LIDOCAINE 40 MG/G
CREAM TOPICAL
Status: DISCONTINUED | OUTPATIENT
Start: 2021-06-10 | End: 2021-06-10 | Stop reason: HOSPADM

## 2021-06-10 RX ORDER — IBUPROFEN 600 MG/1
600 TABLET, FILM COATED ORAL EVERY 6 HOURS
Qty: 12 TABLET | Refills: 0 | Status: SHIPPED | OUTPATIENT
Start: 2021-06-10 | End: 2021-08-26

## 2021-06-10 RX ORDER — ALBUTEROL SULFATE 90 UG/1
AEROSOL, METERED RESPIRATORY (INHALATION) PRN
Status: DISCONTINUED | OUTPATIENT
Start: 2021-06-10 | End: 2021-06-10

## 2021-06-10 RX ORDER — ONDANSETRON 4 MG/1
4-8 TABLET, ORALLY DISINTEGRATING ORAL EVERY 8 HOURS PRN
Qty: 4 TABLET | Refills: 0 | Status: SHIPPED | OUTPATIENT
Start: 2021-06-10 | End: 2021-06-21

## 2021-06-10 RX ORDER — FENTANYL CITRATE 50 UG/ML
INJECTION, SOLUTION INTRAMUSCULAR; INTRAVENOUS PRN
Status: DISCONTINUED | OUTPATIENT
Start: 2021-06-10 | End: 2021-06-10

## 2021-06-10 RX ORDER — SODIUM CHLORIDE, SODIUM LACTATE, POTASSIUM CHLORIDE, CALCIUM CHLORIDE 600; 310; 30; 20 MG/100ML; MG/100ML; MG/100ML; MG/100ML
INJECTION, SOLUTION INTRAVENOUS CONTINUOUS
Status: DISCONTINUED | OUTPATIENT
Start: 2021-06-10 | End: 2021-06-10 | Stop reason: HOSPADM

## 2021-06-10 RX ORDER — CEFAZOLIN SODIUM 2 G/50ML
2 SOLUTION INTRAVENOUS
Status: DISCONTINUED | OUTPATIENT
Start: 2021-06-10 | End: 2021-06-10 | Stop reason: HOSPADM

## 2021-06-10 RX ORDER — BUPIVACAINE HYDROCHLORIDE 2.5 MG/ML
INJECTION, SOLUTION INFILTRATION; PERINEURAL PRN
Status: DISCONTINUED | OUTPATIENT
Start: 2021-06-10 | End: 2021-06-10 | Stop reason: HOSPADM

## 2021-06-10 RX ORDER — DEXAMETHASONE SODIUM PHOSPHATE 4 MG/ML
INJECTION, SOLUTION INTRA-ARTICULAR; INTRALESIONAL; INTRAMUSCULAR; INTRAVENOUS; SOFT TISSUE PRN
Status: DISCONTINUED | OUTPATIENT
Start: 2021-06-10 | End: 2021-06-10

## 2021-06-10 RX ORDER — ACETAMINOPHEN 325 MG/1
650 TABLET ORAL EVERY 6 HOURS
Qty: 24 TABLET | Refills: 0 | Status: SHIPPED | OUTPATIENT
Start: 2021-06-10 | End: 2021-08-26

## 2021-06-10 RX ORDER — PROPOFOL 10 MG/ML
INJECTION, EMULSION INTRAVENOUS PRN
Status: DISCONTINUED | OUTPATIENT
Start: 2021-06-10 | End: 2021-06-10

## 2021-06-10 RX ORDER — CEFAZOLIN SODIUM 2 G/50ML
2 SOLUTION INTRAVENOUS SEE ADMIN INSTRUCTIONS
Status: DISCONTINUED | OUTPATIENT
Start: 2021-06-10 | End: 2021-06-10 | Stop reason: HOSPADM

## 2021-06-10 RX ORDER — HEPARIN SODIUM 10000 [USP'U]/ML
5000 INJECTION, SOLUTION INTRAVENOUS; SUBCUTANEOUS
Status: COMPLETED | OUTPATIENT
Start: 2021-06-10 | End: 2021-06-10

## 2021-06-10 RX ORDER — KETOROLAC TROMETHAMINE 30 MG/ML
INJECTION, SOLUTION INTRAMUSCULAR; INTRAVENOUS PRN
Status: DISCONTINUED | OUTPATIENT
Start: 2021-06-10 | End: 2021-06-10

## 2021-06-10 RX ORDER — LIDOCAINE HYDROCHLORIDE 20 MG/ML
INJECTION, SOLUTION INFILTRATION; PERINEURAL PRN
Status: DISCONTINUED | OUTPATIENT
Start: 2021-06-10 | End: 2021-06-10

## 2021-06-10 RX ORDER — IBUPROFEN 200 MG
800 TABLET ORAL ONCE
Status: DISCONTINUED | OUTPATIENT
Start: 2021-06-10 | End: 2021-06-11 | Stop reason: HOSPADM

## 2021-06-10 RX ORDER — OXYCODONE HYDROCHLORIDE 5 MG/1
5 TABLET ORAL EVERY 6 HOURS PRN
Qty: 6 TABLET | Refills: 0 | Status: SHIPPED | OUTPATIENT
Start: 2021-06-10 | End: 2021-06-21

## 2021-06-10 RX ORDER — PROPOFOL 10 MG/ML
INJECTION, EMULSION INTRAVENOUS CONTINUOUS PRN
Status: DISCONTINUED | OUTPATIENT
Start: 2021-06-10 | End: 2021-06-10

## 2021-06-10 RX ORDER — AMOXICILLIN 250 MG
1-2 CAPSULE ORAL 2 TIMES DAILY
Qty: 30 TABLET | Refills: 0 | Status: SHIPPED | OUTPATIENT
Start: 2021-06-10 | End: 2021-06-21

## 2021-06-10 RX ORDER — OXYCODONE HYDROCHLORIDE 5 MG/1
5-10 TABLET ORAL ONCE
Status: COMPLETED | OUTPATIENT
Start: 2021-06-10 | End: 2021-06-10

## 2021-06-10 RX ORDER — ONDANSETRON 2 MG/ML
INJECTION INTRAMUSCULAR; INTRAVENOUS PRN
Status: DISCONTINUED | OUTPATIENT
Start: 2021-06-10 | End: 2021-06-10

## 2021-06-10 RX ADMIN — SODIUM CHLORIDE, SODIUM LACTATE, POTASSIUM CHLORIDE, CALCIUM CHLORIDE: 600; 310; 30; 20 INJECTION, SOLUTION INTRAVENOUS at 09:32

## 2021-06-10 RX ADMIN — OXYCODONE HYDROCHLORIDE 5 MG: 5 TABLET ORAL at 10:07

## 2021-06-10 RX ADMIN — Medication 20 MG: at 07:55

## 2021-06-10 RX ADMIN — Medication 30 MG: at 08:09

## 2021-06-10 RX ADMIN — KETAMINE HYDROCHLORIDE 20 MG: 10 INJECTION, SOLUTION INTRAMUSCULAR; INTRAVENOUS at 07:40

## 2021-06-10 RX ADMIN — HEPARIN SODIUM 5000 UNITS: 10000 INJECTION, SOLUTION INTRAVENOUS; SUBCUTANEOUS at 06:56

## 2021-06-10 RX ADMIN — SODIUM CHLORIDE, SODIUM LACTATE, POTASSIUM CHLORIDE, CALCIUM CHLORIDE: 600; 310; 30; 20 INJECTION, SOLUTION INTRAVENOUS at 06:37

## 2021-06-10 RX ADMIN — ALBUTEROL SULFATE 6 PUFF: 90 INHALANT RESPIRATORY (INHALATION) at 08:30

## 2021-06-10 RX ADMIN — KETOROLAC TROMETHAMINE 30 MG: 30 INJECTION, SOLUTION INTRAMUSCULAR; INTRAVENOUS at 09:29

## 2021-06-10 RX ADMIN — ALBUTEROL SULFATE 3 PUFF: 90 INHALANT RESPIRATORY (INHALATION) at 07:45

## 2021-06-10 RX ADMIN — CEFAZOLIN SODIUM 2 G: 2 SOLUTION INTRAVENOUS at 07:50

## 2021-06-10 RX ADMIN — LIDOCAINE HYDROCHLORIDE 100 MG: 20 INJECTION, SOLUTION INFILTRATION; PERINEURAL at 07:35

## 2021-06-10 RX ADMIN — FENTANYL CITRATE 50 MCG: 50 INJECTION, SOLUTION INTRAMUSCULAR; INTRAVENOUS at 07:35

## 2021-06-10 RX ADMIN — PROPOFOL 180 MG: 10 INJECTION, EMULSION INTRAVENOUS at 07:35

## 2021-06-10 RX ADMIN — PROPOFOL 150 MCG/KG/MIN: 10 INJECTION, EMULSION INTRAVENOUS at 07:35

## 2021-06-10 RX ADMIN — Medication 20 MG: at 08:58

## 2021-06-10 RX ADMIN — FENTANYL CITRATE 50 MCG: 50 INJECTION, SOLUTION INTRAMUSCULAR; INTRAVENOUS at 08:00

## 2021-06-10 RX ADMIN — ONDANSETRON 4 MG: 2 INJECTION INTRAMUSCULAR; INTRAVENOUS at 07:50

## 2021-06-10 RX ADMIN — DEXAMETHASONE SODIUM PHOSPHATE 4 MG: 4 INJECTION, SOLUTION INTRA-ARTICULAR; INTRALESIONAL; INTRAMUSCULAR; INTRAVENOUS; SOFT TISSUE at 07:50

## 2021-06-10 RX ADMIN — ALBUTEROL SULFATE 3 PUFF: 90 INHALANT RESPIRATORY (INHALATION) at 07:40

## 2021-06-10 RX ADMIN — Medication 100 MCG: at 09:01

## 2021-06-10 RX ADMIN — Medication 50 MG: at 07:35

## 2021-06-10 ASSESSMENT — MIFFLIN-ST. JEOR: SCORE: 1483.33

## 2021-06-10 NOTE — BRIEF OP NOTE
Brief Operative Note     Pre-operative diagnosis:   Squamous cell carcinoma of cervix, stage 1A1 with negative LVSI  Body mass index is 37.11 kg/m .     Post-operative diagnosis: same    Procedure(s):  Total laparoscopic hysterectomy, bilateral salpingectomy    Surgeon(s) and Role:     * Fritz Barron MD - Primary    Anesthesia:   General    EBL: 50ml    Drains: None, finney during case    Specimen(s):  ID Type Source Tests Collected by Time Destination   A : Uterus, cervix, bilateral fallopian tubes  Tissue Uterus, Cervix and Bilateral Fallopian Tubes SURGICAL PATHOLOGY EXAM Fritz Barron MD 6/10/2021  9:01 AM        Findings: Bimanual limited by habitus but normal uterus, mobile. Cervix with evidence of prior cone procedure, healed. Intra-abdominal survey is normal including upper abdomen. Adhesions of the omentum in the RLQ. Taken down with Ligasure. Uterus, fallopian tubes and ovaries were normal. Patient had difficulty with airway peak pressures and with maintaining saturations on 100% oxygen for unclear etiology despite scope, etc by anesthesia. Hemostatic at the conclusion of the case.    Complications: none    Disposition: stable to PACU

## 2021-06-10 NOTE — ANESTHESIA CARE TRANSFER NOTE
Patient: Heron Gee    Procedure(s):  Total laparoscopic hysterectomy, bilateral salpingectomy    Diagnosis: Squamous cell carcinoma of cervix, stage 1 (H) [C53.9]  Diagnosis Additional Information: No value filed.    Anesthesia Type:   General     Note:      Level of Consciousness: awake  Oxygen Supplementation: face mask  Level of Supplemental Oxygen (L/min / FiO2): 6  Independent Airway: airway patency satisfactory and stable  Dentition: dentition unchanged  Vital Signs Stable: post-procedure vital signs reviewed and stable  Report to RN Given: handoff report given  Patient transferred to: PACU    Handoff Report: Identifed the Patient, Identified the Reponsible Provider, Reviewed the pertinent medical history, Discussed the surgical course, Reviewed Intra-OP anesthesia mangement and issues during anesthesia, Set expectations for post-procedure period and Allowed opportunity for questions and acknowledgement of understanding      Vitals: (Last set prior to Anesthesia Care Transfer)  CRNA VITALS  6/10/2021 0917 - 6/10/2021 0954      6/10/2021             Pulse:  101    SpO2:  97 %    Resp Rate (set):  10        Electronically Signed By: FELA Soto CRNA  Kassie 10, 2021  9:54 AM

## 2021-06-10 NOTE — INTERVAL H&P NOTE
I have seen the patient day of surgery. I have reviewed the below H&P from clinic and there have been no significant changes. We will proceed with surgery as detailed in the note below.     Exam:  Gen: NAD  CV: RRR  Lungs: CTAB    Proceed with surgery as planned.    Fritz Barron MD    Gynecologic Oncology

## 2021-06-10 NOTE — ANESTHESIA POSTPROCEDURE EVALUATION
Patient: Heron Gee    Procedure(s):  Total laparoscopic hysterectomy, bilateral salpingectomy    Diagnosis:Squamous cell carcinoma of cervix, stage 1 (H) [C53.9]  Diagnosis Additional Information: No value filed.    Anesthesia Type:  General    Note:  Disposition: Outpatient   Postop Pain Control: Uneventful            Sign Out: Well controlled pain   PONV: No   Neuro/Psych: Uneventful            Sign Out: Acceptable/Baseline neuro status   Airway/Respiratory: Uneventful            Sign Out: Acceptable/Baseline resp. status   CV/Hemodynamics: Uneventful            Sign Out: Acceptable CV status   Other NRE: NONE   DID A NON-ROUTINE EVENT OCCUR? No           Last vitals:  Vitals:    06/10/21 1014 06/10/21 1041 06/10/21 1100   BP: 120/86 119/62 123/82   Pulse: 89 90 89   Resp: 14 14 14   Temp: 36.2  C (97.2  F) 36.2  C (97.2  F) 36.3  C (97.3  F)   SpO2: 95% 93% 94%       Last vitals prior to Anesthesia Care Transfer:  CRNA VITALS  6/10/2021 0917 - 6/10/2021 1017      6/10/2021             Pulse:  101    SpO2:  97 %    Resp Rate (set):  10          Electronically Signed By: Kevin Moe DO  Kassie 10, 2021  12:01 PM

## 2021-06-10 NOTE — OP NOTE
[facility]  SURGICAL OPERATIVE REPORT    Pre-operative diagnosis:   Squamous cell carcinoma of cervix, stage 1A1 with negative LVSI  Body mass index is 37.11 kg/m .      Post-operative diagnosis:    same     Procedure(s):  Total laparoscopic hysterectomy, bilateral salpingectomy     Surgeon(s) and Role:     * Fritz Barron MD - Primary  Pina Ward MD PGY-4 - Assistant     Anesthesia:   General     EBL: 50ml  IVF: 1000 ml     Drains: None, finney during case     Specimen(s):  ID Type Source Tests Collected by Time Destination   A : Uterus, cervix, bilateral fallopian tubes  Tissue Uterus, Cervix and Bilateral Fallopian Tubes SURGICAL PATHOLOGY EXAM Fritz Barron MD 6/10/2021  9:01 AM           Findings: Bimanual limited by habitus but normal uterus, mobile. Cervix with evidence of prior cone procedure, healed. Intra-abdominal survey is normal including upper abdomen. Adhesions of the omentum in the RLQ. Taken down with Ligasure. Uterus, fallopian tubes and ovaries were normal. Patient had difficulty with airway peak pressures and with maintaining saturations on 100% oxygen for unclear etiology despite scope, etc by anesthesia. Hemostatic at the conclusion of the case.     Complications: none    Indication:  Heron Gee is a 37 yo  premenopausal female with Stage 1A1 squamous cell carcinoma of the cervix with negative LVSI s/p CKC. She presents today for planned total laparoscopic hysterectomy with bilateral salpingectomy. Risks, benefits, and alternatives to the procedure were discussed with the patient who elected to proceed.  All questions were answered and an informed consent was obtained.    Procedure:  The patient was taken to the OR where general endotracheal anesthesia was administered. She was placed in a dorsal lithotomy position using yellow fin stirrups. Exam under anesthesia revealed the above listed findings. She was then prepped and draped in usual sterile  fashion. After time out was completed, a finney catheter was placed in the bladder, and a medium VCare was placed through the cervix for uterine manipulation. Attention was then turned towards the abdomen. The umbilicus was grasped with an Allis clamp and elevated and bilateral periumbilical folds were grasped with perforating towel clamps. The umbilicus was then incised with a scalpel and a Veress needle was inserted into the abdomen. Intraabdominal placement was verified with a saline drop test. The abdomen was insufflated to a pressure of 15 mmHg. Opening pressure was 4 mmHg. A 5 mm trocar was then inserted into the umbilicus under direct visualization using the Visiport. Laparoscopic findings were as noted above. The patient was placed in Trendelenburg position. Positioning was limited by difficulty with oxygenation. An additional port was placed in the left lower quadrant. Adhesions from the omentum to the anterior abdominal wall on the right were taken down with the Ligasure. Two additional 5 ports were placed in the right lower quadrant. The bowel was swept out of the pelvis. The right fallopian tube was elevated, and the mesosalpinx was serially cauterized and transected. The right uterovarian ligament was cauterized and transected. The right round ligament was cauterized and transected using the Ligasure device. The anterior broad ligament was taken down medially. Attention was then turned to the left side, where the tube was elevated, and the mesosalpinx was serially cauterized and transected. The uteroovarian ligaments and round ligaments were cauterized and divided. The anterior leaf of the broad ligament was divided, and a bladder flap was created. The left uterine vessels were then serially cauterized and divided. The vessels and ureter were then lateralized away from the cervix. The bladder flap was completed on the right side, and the right uterine vessels were similarly cauterized, transected, and  lateralized away. The colpotomy was then performed with Monopolar J hook. The uterus was removed from the vagina. A vaginal occluder balloon was placed in the vagina for pneumoperitoneum. The vaginal cuff was closed with a running 0 V-lock suture. The vaginal cuff was palpated from below and noted to be intact. Pneumoperitoneum was expelled, and the ports were removed. Skin was then sutured with 4-0 monocryl suture and steri strips and sterile dressings were applied. She was awoken from anesthesia without difficulty and was transported to PACU in a stable condition.      Dr. Barron was scrubbed and present for the entire duration of the procedure.      Pina Ward MD,  Ob/Gyn PGY-4  06/10/21 9:58 AM    Attending Attestation  I was present for and participated in the entire procedure. I agree with the procedure as documented in the note above, which I have edited as necessary.     Fritz Barron MD    Gynecologic Oncology

## 2021-06-10 NOTE — ANESTHESIA PREPROCEDURE EVALUATION
Anesthesia Pre-Procedure Evaluation    Patient: Heron Gee   MRN: [id number] : 1985        Preoperative Diagnosis: Squamous cell carcinoma of cervix, stage 1 (H) [C53.9]   Procedure : Procedure(s):  Total laparoscopic hysterectomy, bilateral salpingectomy     Past Medical History:   Diagnosis Date     Abnormal Pap smear of cervix     4/17/15, . 21     Cervical high risk HPV (human papillomavirus) test positive     04/15/2019, 10/28/19, 21     Cervix cancer (H)      Exercise-induced asthma       Past Surgical History:   Procedure Laterality Date     APPENDECTOMY OPEN CHILD        SECTION  09/04/2019    x 2     COLPOSCOPY, CONIZATION, COMBINED N/A 4/15/2021    Procedure: Cold Knife Cone of the cervix;  Surgeon: Fritz Barron MD;  Location: [facility] OR     LEEP TX, CERVICAL       NOSE SURGERY      Deviated septum      No Known Allergies   Social History     Tobacco Use     Smoking status: Current Every Day Smoker     Packs/day: 0.50     Years: 13.00     Pack years: 6.50     Types: Cigarettes     Smokeless tobacco: Never Used   Substance Use Topics     Alcohol use: Yes     Frequency: Monthly or less      Wt Readings from Last 1 Encounters:   06/10/21 86.2 kg (190 lb)        Anesthesia Evaluation   Pt has had prior anesthetic.         ROS/MED HX  ENT/Pulmonary:  - neg pulmonary ROS   (+) Intermittent, asthma     Neurologic:  - neg neurologic ROS     Cardiovascular:  - neg cardiovascular ROS     METS/Exercise Tolerance: >4 METS    Hematologic:  - neg hematologic  ROS     Musculoskeletal:  - neg musculoskeletal ROS     GI/Hepatic:  - neg GI/hepatic ROS     Renal/Genitourinary:  - neg Renal ROS     Endo:  - neg endo ROS   (+) Obesity,     Psychiatric/Substance Use:  - neg psychiatric ROS     Infectious Disease:  - neg infectious disease ROS     Malignancy:   (+) Malignancy, History of Other.Other CA Cervix cancer, Malignant neoplasm of overlapping sites of cervix status post.    Other:  -  neg other ROS          Physical Exam    Airway  airway exam normal      Mallampati: I   TM distance: > 3 FB   Neck ROM: full   Mouth opening: > 3 cm    Respiratory Devices and Support         Dental  no notable dental history         Cardiovascular   cardiovascular exam normal       Rhythm and rate: regular and normal     Pulmonary   pulmonary exam normal        breath sounds clear to auscultation           OUTSIDE LABS:  CBC:   Lab Results   Component Value Date    WBC 13.4 (H) 01/29/2021    WBC 10.2 03/24/2019    HGB 14.6 01/29/2021    HGB 12.7 06/14/2019    HCT 43.7 01/29/2021    HCT 40.7 03/24/2019     01/29/2021     03/24/2019     BMP:   Lab Results   Component Value Date     01/29/2021     03/24/2019    POTASSIUM 3.5 01/29/2021    POTASSIUM 3.5 03/24/2019    CHLORIDE 108 01/29/2021    CHLORIDE 109 03/24/2019    CO2 25 01/29/2021    CO2 21 03/24/2019    BUN 16 01/29/2021    BUN 10 03/24/2019    CR 0.83 01/29/2021    CR 0.57 03/24/2019    GLC 84 01/29/2021     (H) 03/24/2019     COAGS: No results found for: PTT, INR, FIBR  POC:   Lab Results   Component Value Date    HCG Negative 06/10/2021     HEPATIC:   Lab Results   Component Value Date    ALBUMIN 3.7 01/29/2021    PROTTOTAL 7.6 01/29/2021    ALT 29 01/29/2021    AST 10 01/29/2021    ALKPHOS 67 01/29/2021    BILITOTAL 0.2 01/29/2021     OTHER:   Lab Results   Component Value Date    A1C 4.9 06/14/2019    URVASHI 9.0 01/29/2021    TSH 1.08 01/29/2021       Anesthesia Plan    ASA Status:  3   NPO Status:  NPO Appropriate    Anesthesia Type: General.     - Airway: ETT   Induction: Intravenous, Propofol.   Maintenance: Balanced.        Consents    Anesthesia Plan(s) and associated risks, benefits, and realistic alternatives discussed. Questions answered and patient/representative(s) expressed understanding.     - Discussed with:  Patient    Use of blood products discussed: No .     Postoperative Care    Pain management: Multi-modal  analgesia, Oral pain medications.   PONV prophylaxis: Ondansetron (or other 5HT-3), Dexamethasone or Solumedrol     Comments:         H&P reviewed: Unable to attach H&P to encounter due to EHR limitations. H&P Update: appropriate H&P reviewed, patient examined. No interval changes since H&P (within 30 days).         Kevin Moe, DO

## 2021-06-10 NOTE — DISCHARGE INSTRUCTIONS
"[facility]  Home Care Following Anesthesia  For 24 hours after surgery:  1. Get plenty of rest.  A responsible adult must stay with you for at least 24 hours after you leave the surgery center.  2. Do not drive or use heavy equipment.  If you have weakness or tingling, don't drive or use heavy equipment until this feeling goes away.   3. Do not drink alcohol.   4. Avoid strenuous or risky activities.  Ask for help when climbing stairs.  5. You may feel lightheaded.  IF so, sit for a few minutes before standing.  Have someone help you get up.   6. If you have nausea (feel sick to your stomach): Drink only clear liquids such as apple juice, ginger ale, broth or 7-Up.  Rest may also help.  Be sure to drink enough fluids.  Move to a regular diet as you feel able.   7. You may have a slight fever.  Call the doctor if your fever is over 100 F (37.7 C) (taken under the tongue) or lasts longer than 24 hours.  8. You may have a dry mouth, a sore throat, muscle aches or trouble sleeping. These should go away after 24 hours.  9. Do not make important or legal decisions.   10. It is recommended to avoid smoking.        Today you received a Marcaine or bupivacaine block to numb the nerves near your surgery site.  This is a block using local anesthetic or \"numbing\" medication injected around the nerves to anesthetize or \"numb\" the area supplied by those nerves.  This block is injected into the muscle layer near your surgical site.  The medication may numb the location where you had surgery for 6-18 hours, but may last up to 24 hours.  If your surgical site is an arm or leg you should be careful with your affected limb, since it is possible to injure your limb without being aware of it due to the numbing.  Until full feeling returns, you should guard against bumping or hitting your limb, and avoid extreme hot or cold temperatures on the skin.  As the block wears off, the feeling will return as " a tingling or prickly sensation near your surgical site.  You will experience more discomfort from your incision as the feeling returns.  You may want to take a pain pill (a narcotic or Tylenol if this was prescribed by your surgeon) when you start to experience mild pain before the pain beccomes more severe.  If your pain medications do not control your pain you should notifiy your surgeon.    Tips for taking pain medications  To get the best pain relief possible, remember these points:    Take pain medications as directed, before pain becomes severe.    Pain medication can upset your stomach: taking it with food may help.    Constipation is a common side effect of pain medication. Drink plenty of  fluids.    Eat foods high in fiber. Take a stool softener if recommended by your doctor or pharmacist.    Do not drink alcohol, drive or operate machinery while taking pain medications.    Ask about other ways to control pain, such as with heat, ice or relaxation.    Tylenol/Acetaminophen Consumption  To help encourage the safe use of acetaminophen, the makers of TYLENOL  have lowered the maximum daily dose for single-ingredient Extra Strength TYLENOL  (acetaminophen) products sold in the [place] from 8 pills per day (4,000 mg) to 6 pills per day (3,000 mg). The dosing interval has also changed from 2 pills every 4-6 hours to 2 pills every 6 hours.    If you feel your pain relief is insufficient, you may take Tylenol/Acetaminophen in addition to your narcotic pain medication.     Be careful not to exceed 3,000 mg of Tylenol/Acetaminophen in a 24 hour period from all sources.    If you are taking extra strength Tylenol/acetaminophen (500 mg), the maximum dose is 6 tablets in 24 hours.    If you are taking regular strength acetaminophen (325 mg), the maximum dose is 9 tablets in 24 hours.    Call a doctor for any of the followin. Signs of infection (fever, growing tenderness at the surgery site, a large amount of  drainage or bleeding, severe pain, foul-smelling drainage, redness, swelling).  2. It has been over 8 to 10 hours since surgery and you are still not able to urinate (pass water).  3. Headache for over 24 hours.  4. Signs of Covid-19 infection (temperature over 100 degrees, shortness of breath, cough, loss of taste/smell, generalized body aches, persistent headache, chills, sore throat, nausea/vomiting/diarrhea)  Your doctor is:       Fritz Barron, Gynecologic Oncology: [phone]               Or dial [phone] and ask for the resident on call for:  Gynecologic Oncology  For emergency care, call the:  [facility]:  [phone] (TTY for hearing impaired: [phone])

## 2021-06-20 ENCOUNTER — OFFICE VISIT (OUTPATIENT)
Dept: URGENT CARE | Facility: URGENT CARE | Age: 36
End: 2021-06-20
Payer: COMMERCIAL

## 2021-06-20 VITALS
HEART RATE: 64 BPM | OXYGEN SATURATION: 98 % | SYSTOLIC BLOOD PRESSURE: 106 MMHG | DIASTOLIC BLOOD PRESSURE: 69 MMHG | TEMPERATURE: 97.6 F

## 2021-06-20 DIAGNOSIS — H66.001 NON-RECURRENT ACUTE SUPPURATIVE OTITIS MEDIA OF RIGHT EAR WITHOUT SPONTANEOUS RUPTURE OF TYMPANIC MEMBRANE: Primary | ICD-10-CM

## 2021-06-20 PROCEDURE — 99213 OFFICE O/P EST LOW 20 MIN: CPT | Performed by: INTERNAL MEDICINE

## 2021-06-20 RX ORDER — AMOXICILLIN 875 MG
875 TABLET ORAL 2 TIMES DAILY
Qty: 20 TABLET | Refills: 0 | Status: SHIPPED | OUTPATIENT
Start: 2021-06-20 | End: 2021-06-30

## 2021-06-20 NOTE — PROGRESS NOTES
SUBJECTIVE:  Heron Gee is an 36 year old female who presents for neck pain.  Had lap hysterectomy about 10 days ago.  About four days ago started feeling tired and having some headaches and neck pain.  Doesn't feel like a sinus infection to her.  Feels tender behind ear, and around temple.  Some right ear pain.  Ice pack seems to help.  Ibuprofen not help.  No fevers.  Feels fine in abdomen where had hysterectomy was done.  No increased pain in abdomen.  No known exposures.  Feels like glands in head and neck are sore.  No cough.  Had some nasal congestion after surgery.  No n/v/d.  Not feel dizzy.  Eating okay. No recent injuries, falls, or trauma.    PMH:   has a past medical history of Abnormal Pap smear of cervix, Cervical high risk HPV (human papillomavirus) test positive, Cervix cancer (H), and Exercise-induced asthma.  Patient Active Problem List   Diagnosis     Supervision of normal first pregnancy     Obesity     Family history of diabetes mellitus     Cervical cancer (H)     Previous  delivery, antepartum condition or complication     Malignant neoplasm of overlapping sites of cervix (H)     Squamous cell carcinoma of cervix, stage 1 (H)     Social History     Socioeconomic History     Marital status: Single     Spouse name: None     Number of children: None     Years of education: None     Highest education level: None   Occupational History     None   Social Needs     Financial resource strain: None     Food insecurity     Worry: None     Inability: None     Transportation needs     Medical: None     Non-medical: None   Tobacco Use     Smoking status: Current Every Day Smoker     Packs/day: 0.50     Years: 13.00     Pack years: 6.50     Types: Cigarettes     Smokeless tobacco: Never Used   Substance and Sexual Activity     Alcohol use: Yes     Frequency: Monthly or less     Drug use: No     Sexual activity: Yes     Partners: Male     Birth control/protection: Inserts/Ring   Lifestyle      Physical activity     Days per week: None     Minutes per session: None     Stress: None   Relationships     Social connections     Talks on phone: None     Gets together: None     Attends [employer] witness service: None     Active member of club or organization: None     Attends meetings of clubs or organizations: None     Relationship status: None     Intimate partner violence     Fear of current or ex partner: None     Emotionally abused: None     Physically abused: None     Forced sexual activity: None   Other Topics Concern     Parent/sibling w/ CABG, MI or angioplasty before 65F 55M? Not Asked   Social History Narrative     None     Family History   Problem Relation Age of Onset     Diabetes Brother      Cancer Paternal Great-Grandmother      Breast Cancer Paternal Great-Grandmother      Hypertension No family hx of      Ovarian Cancer No family hx of      Cerebrovascular Disease No family hx of      Thyroid Disease No family hx of        ALLERGIES:  Patient has no known allergies.    Current Outpatient Medications   Medication     acetaminophen (TYLENOL) 325 MG tablet     albuterol (PROAIR HFA/PROVENTIL HFA/VENTOLIN HFA) 108 (90 Base) MCG/ACT inhaler     fexofenadine (ALLEGRA) 180 MG tablet     ibuprofen (ADVIL/MOTRIN) 600 MG tablet     Probiotic Product ([employer] PROBIOTIC) CHEW     ondansetron (ZOFRAN-ODT) 4 MG ODT tab     oxyCODONE (ROXICODONE) 5 MG tablet     Prenatal Vit-Fe Fumarate-FA (PRENATAL MULTIVITAMIN W/IRON) 27-0.8 MG tablet     senna-docusate (SENOKOT-S/PERICOLACE) 8.6-50 MG tablet     No current facility-administered medications for this visit.          ROS:  ROS is done and is negative for general/constitutional, eye, ENT, Respiratory, cardiovascular, GI, , Skin, musculoskeletal except as noted elsewhere.  All other review of systems negative except as noted elsewhere.      OBJECTIVE:  /69   Pulse 64   Temp 97.6  F (36.4  C) (Tympanic)   LMP 06/01/2021   SpO2 98%   GENERAL APPEARANCE:  Alert, in no acute distress  EYES: normal  EARS: Rt TM: bulging with cloudy fluid, minimal erythema of TM. Lt TM: mild clear effusion, no erythema  NOSE:mildly inflamed mucosa  OROPHARYNX:normal  NECK:No adenopathy,masses or thyromegaly  RESP: normal and clear to auscultation  CV:regular rate and rhythm and no murmurs, clicks, or gallops  ABDOMEN: Abdomen soft, non-tender. BS normal. No masses, organomegaly.  Surgical incisions are c/d/i and appear to be healing well.  SKIN: small superficial abrasion behind right ear, no tenderness or drainage  MUSCULOSKELETAL:Musculoskeletal normal      RESULTS  No results found for any visits on 06/20/21..  No results found for this or any previous visit (from the past 48 hour(s)).    ASSESSMENT/PLAN:    ASSESSMENT / PLAN:  (H66.001) Non-recurrent acute suppurative otitis media of right ear without spontaneous rupture of tympanic membrane  (primary encounter diagnosis)  Comment: currently this appears to be the cause of her current sxs.  Will tx and have pt f/u if not improve  Plan: amoxicillin (AMOXIL) 875 MG tablet        Reviewed medication instructions and side effects. Follow up if experiences side effects.. I reviewed supportive care, otc meds to use if needed, expected course, and signs of concern.  Follow up as needed or if she does not improve within 5 day(s) or if worsens in any way.  Reviewed red flag symptoms and is to go to the ER if experiences any of these.      PPE worn: mask and shield.    See [facility] for orders, medications, letters, patient instructions    Tiara Rodriguez M.D.

## 2021-06-21 ENCOUNTER — ONCOLOGY VISIT (OUTPATIENT)
Dept: ONCOLOGY | Facility: CLINIC | Age: 36
End: 2021-06-21
Payer: COMMERCIAL

## 2021-06-21 VITALS
HEIGHT: 60 IN | OXYGEN SATURATION: 97 % | HEART RATE: 83 BPM | RESPIRATION RATE: 16 BRPM | DIASTOLIC BLOOD PRESSURE: 87 MMHG | BODY MASS INDEX: 41.17 KG/M2 | SYSTOLIC BLOOD PRESSURE: 124 MMHG | TEMPERATURE: 97.5 F | WEIGHT: 209.7 LBS

## 2021-06-21 DIAGNOSIS — R09.02 HYPOXIA: Primary | ICD-10-CM

## 2021-06-21 DIAGNOSIS — J45.20 MILD INTERMITTENT ASTHMA WITHOUT COMPLICATION: ICD-10-CM

## 2021-06-21 PROBLEM — C53.8 MALIGNANT NEOPLASM OF OVERLAPPING SITES OF CERVIX (H): Status: RESOLVED | Noted: 2021-04-02 | Resolved: 2021-06-21

## 2021-06-21 LAB — COPATH REPORT: NORMAL

## 2021-06-21 PROCEDURE — 99212 OFFICE O/P EST SF 10 MIN: CPT | Mod: 24 | Performed by: OBSTETRICS & GYNECOLOGY

## 2021-06-21 ASSESSMENT — MIFFLIN-ST. JEOR: SCORE: 1572.69

## 2021-06-21 NOTE — PROGRESS NOTES
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 She is here today for pathology review and treatment recommendations.    She is overall feeling well. She is eating and drinking well. In general she has no significant diarrhea/constipation or bladder concerns. Her pain is minimal at this point. She doesn't raise any incisional concerns. She denies vaginal bleeding. She has headaches down her cheek and on the right side of her head. Just started antibiotics for ear infection yesterday. Otherwise no sinus pain, pressure, drainage. No other symptoms.    Of note, her case was complicated by difficulty with ventilation and oxygenation. She had high peak pressures on the vent and required 100% FiO2 to maintain sats above 80%. Anesthesia performed bronchoscopy through the ET tube intra-operatively with no evidence of etiology. She was able to saturate appropriately after reversal of anesthetic and discharge to home. However, due to the unexpected challenges with maintaining her oxygenation, anesthesiologist recommended she have pulmonary evaluation post-operatively.      Relevant history:  4/2019 ASCUS/HPV HR positive  10/2019: Pap negative, HPV HR positive  12/2019: ECC negative  1/29/2021: Pap ASCUS/HPV other HR positive  2/22/2021: Colposcopy with EUGENE 2 biopsy and neg ECC  3/15/2021: LEEP with 2.2mm invasive SCC of the cervix extending to endocervical margin, neg LVSI  4/15/2021: CKC, ECC with no residual carcinoma  6/10/2021: Total laparoscopic hysterectomy, bilateral salpingectomy      Review of Systems:  As per HPI, otherwise non-contributory.     Past Surgical History:  Past Surgical History:   Procedure Laterality Date     APPENDECTOMY OPEN  CHILD        SECTION  09/04/2019    x 2     COLPOSCOPY, CONIZATION, COMBINED N/A 4/15/2021    Procedure: Cold Knife Cone of the cervix;  Surgeon: Fritz Barron MD;  Location: [facility] OR     LAPAROSCOPIC HYSTERECTOMY TOTAL, SALPINGECTOMY BILATERAL Bilateral 6/10/2021    Procedure: Total laparoscopic hysterectomy, bilateral salpingectomy;  Surgeon: Fritz Barron MD;  Location: [facility] OR     LEEP TX, CERVICAL       NOSE SURGERY      Deviated septum        Physical Exam:  /87 (BP Location: Right arm, Patient Position: Sitting, Cuff Size: Adult Large)   Pulse 83   Temp 97.5  F (36.4  C) (Oral)   Resp 16   Ht 1.524 m (5')   Wt 95.1 kg (209 lb 11.2 oz)   LMP 2021   SpO2 97%   BMI 40.95 kg/m     General appearance: no acute distress, well groomed, sitting comfortably   GI: abdomen soft, incisions well healed    Pathology:  Lab Results   Component Value Date    PATH  04/15/2021     Patient Name: DEJAN ROBERT  MR#: [id number]  Specimen #: [id number]  Collected: 4/15/2021  Received: 2021  Reported: 2021 10:41  Ordering Phy(s): FRITZ BARRON    For improved result formatting, select 'View Enhanced Report Format' under   Linked Documents section.    SPECIMEN(S):  A: Cervical cone  B: Endocervical curettings    FINAL DIAGNOSIS:  A. CERVIX, CONE:  - Cervical tissue with stromal scarring and inflammatory changes,   consistent with healing of a recent surgery  site  - Epithelial reactive changes  - Negative for residual dysplasia or neoplasia    B. ENDOCERVIX, CURETTAGE:  - Endocervical mucosal fragments with no significant histologic   abnormality  - Unremarkable lower uterine segment endometrium  - Negative for dysplasia or neoplasia    I have personally reviewed all specimens and/or slides, including the   listed special stains, and used them  with my medical judgement to determine or confirm the final diagnosis.    Electronically signed out by:    Kathleen Squires M.D., PhD,  "[facility]    CLINICAL HISTORY:  Patient with history of invasive squamous cell carcinoma in a LEEP    GROSS:  A:  The specimen is received in formalin with proper patient   identification, labeled \"cervical cone stitch at  12:00\".  The specimen consists of a 2.1 x 1.7 x 1.3 cm oriented cervical   cone. There is a stitch designating  12:00 per requisition form and specimen container. The ectocervix is   white-pink, smooth, and glistening with  no definite lesions identified. The endocervical margin is inked black and   the stroma margin is inked blue.  The endocervical canal is tan-pink, smooth, and glistening with no   definite masses identified. The specimen is  sectioned and entirely submitted.    Summary of Sections:  A1 - 12:00 to 3:00  A2-A3 - 3:00 to 6:00  A4 - 6:00 to 9:00  A5 - 9:00 to 12:00    B:  The specimen is received in formalin with proper patient   identification, labeled \"endocervical  curettings\".  The specimen consists of 1.5 x 1.2 x 0.1 cm aggregate of   white-tan soft tissue and brown mucous.  The specimen is filtered, wrapped, and entirely submitted in cassette B1.   (Dictated by: Ivis Lerner  4/16/2021 11:22 AM)    MICROSCOPIC:  Microscopic examination was performed.    The technical component of this testing was completed at the [facility], with the professional component performed   at the [facility], [address] [place] [phone] ([phone])    CPT Codes:  A: 78628-HP2  B: 89054-CE6    COLLECTION SITE:  Client: General acute hospital  Location: [facility] (B)        PATH  03/15/2021     Patient Name: DEJAN ROBERT  MR#: [id number]  Specimen #: [id number]  Collected: 3/15/2021  Received: 3/16/2021  Reported: 3/19/2021 12:42  Ordering Phy(s): CEPHAS MAWUENA AGBEH    For improved result formatting, select 'View Enhanced Report " Format' under   Linked Documents section.    SPECIMEN(S):  A: LEEP biopsy  B: Endocervical curettings    FINAL DIAGNOSIS:    A: Cervix, LEEP conization:  - Invasive, well-differentiated, squamous cell carcinoma arising in   background moderate dysplasia; invasion  measured at 2.2 mm; lesion measures 5 mm.  -Endocervical margin involved by neoplasm  -Ectocervical margin negative  -Please see synoptic reporting template included in this report    B: Endocervix, curettage:  -High-grade squamous intraepithelial lesion present    COMMENT:  As this result is not anticipated, I did try and contact Dr Agbeh directly   before signing out the case but was  not able to reach him so a clinic team member placed an electronic alert   message for him to call me about this  patient. I will follow up with him, as needed, after this case has been   signed out.    Report Name: Uterine Cervix - Excision       Status: Submitted Checklist Inst: 1      Last Updated By: Sarah Cox M.D., 03/19/2021 12:41:45  Part(s) Involved:  A: LEEP biopsy    Synoptic Report:    SPECIMEN    Procedure:        - Loop electrical excision procedure (LEEP) / large loop excision of   the        transformation zone (LLETZ)    TUMOR    Tumor Size: 0.5 Centimeters (cm)    Histologic Type:        - Squamous cell carcinoma, HPV-associated    Histologic Grade:        - G1: Well differentiated    Depth of Stromal Invasion (Millimeters):        2.2 mm    Lymphovascular Invasion:        - Not identified    MARGINS    Margin Status for Invasive Carcinoma:        - Invasive carcinoma present at margin      Margin(s) Involved by Invasive Carcinoma:          - Endocervical    Margin Status for HSIL/AIS:        - High-grade squamous intraepithelial lesion (HSIL) present at   margin      Margin(s) Involved by HSIL:          - Endocervical    CAP eCC [employer] Release Nov 2020    Electronically signed out by:    Sarah Cox M.D.    CLINICAL HISTORY:  Moderate  "dysplasia, EUGENE 2    GROSS:  The specimen is received in formalin with the proper patient   identification, labeled \"cervix\". The specimen  consists of a 1.9 x 1.8 x 1.4 cm annular mucosal covered tissue with a 0.8   cm os. The endocervical margin is  inked blue and the remaining radial margin is inked black. The specimen is   radially sectioned and entirely  submitted in 4 cassettes.    B. The specimen is received in formalin with the proper patient   identification, labeled \"post LEEP ECC\". The  specimen consists of a brush collection device containing a 1.0 cm   aggregate of tan mucoid substance admixed  with clotted blood. The specimen is filtered and entirely submitted in one   cassette. (Dictated by: ZAIN Crenshaw 3/16/2021 02:55 PM)    MICROSCOPIC:  A. Examination shows areas of surface high-grade squamous intraepithelial   lesion consistent with moderate  dysplasia. Severe dysplasia and carcinoma in situ are not evident.   Unexpectedly, however, and present on  tissue sections from blocks A3 and A4, there is a bulbous, downward   projecting squamous proliferation which in  areas shows irregular islands of epithelial cells with one or two cells in   the stroma.There is an associated  chronic inflammatory reaction. On H&E examination, initially, the findings   are of concern for invasive  squamous cell carcinoma. This is confirmed on p16 stain which is positive.   The slides have been seen in  intradepartmental consultation with universal agreement. Invasion is   measured at 2.2 mm. The invasive process  and overlying dysplastic one involve the endocervical sample margin.    B. A formal microscopic examination has been performed. This material has   also been seen in intradepartmental  consultation. A p16 stain was performed and supports the findings given in   the final diagnosis.    The technical component of this testing was completed at the [facility], with the professional component performed   at the [facility], [address] [place]  [phone] ([phone])    CPT Codes:  A: 22884-EY8, 03137-ELN  B: 03005-PH0, 16153-KKW    COLLECTION SITE:  Client: [facility]  Location: [facility] (B)      PATH  02/22/2021     Patient Name: DEJAN ROBERT  MR#: [id number]  Specimen #: [id number]  Collected: 2/22/2021  Received: 2/24/2021  Reported: 2/25/2021 13:07  Ordering Phy(s): CEPHAS MAWUENA AGBEH    For improved result formatting, select 'View Enhanced Report Format' under   Linked Documents section.    SPECIMEN(S):  A: Endocervical curettings  B: Cervical biopsy, 12 o'clock    FINAL DIAGNOSIS:  A: Endocervix, curettings:  - Negative for dysplasia or malignancy.    B: Cervix, 12:00, biopsy:  - High-grade squamous intraepithelial lesion (EUGENE 2).    Electronically signed out by:    Yemi Cherry M.D.    GROSS:  A. The specimen is received in formalin with the proper patient   identification, labeled \"ECC\". The specimen  consists of a 1.3 cm aggregate of tan mucoid substance admixed with   clotted blood. The specimen is filtered  and entirely submitted in one cassette.    B. The specimen is received in formalin with the proper patient   identification, labeled \"cervical biopsy,  12:00\". The specimen consists of a 0.2 cm tan-white soft tissue fragment   admixed with a 1.0 cm aggregate of  tan mucoid substance. The specimen is filtered and entirely submitted in   one cassette. (Dictated by: ZAIN Crenshaw 2/24/2021 09:42 AM)    MICROSCOPIC:  A: Sections demonstrate glandular and squamous (transitional zone) mucosa.    There is no dysplasia or  malignancy.    B: Sections demonstrate squamous and glandular (transitional zone) mucosa.    There is squamous dysplasia.  For  interpretative support, stains for p16 are performed (on B1) with adequate   controls.  The p16 " "stain  demonstrates areas of \"strong and diffuse\" positivity in the dysplastic   squamous epithelium, a finding which  supports high-grade squamous intraepithelial lesion (EUGENE 2).    Intradepartmental consultation was obtained.    The technical component of this testing was completed at the [facility], with the professional component performed   at the [facility], [address] [place] [address]  [phone] ([phone])    CPT Codes:  A: 57700-TC0  B: 74440-PF4, 04976-YVR    COLLECTION SITE:  Client: [facility]  Location: [facility] (B)         Assessment:  Heron is a 36 year old  pre-menopausal female with Stage 1A1 squamous cell carcinoma of the cervix s/p Total laparoscopic hysterectomy, bilateral salpingectomy here to discuss results and follow-up.     Plan:  - Squamous cell carcinoma of the cervix: reviewed the diagnosis and that it was caught very early which makes the risk of recurrence quite low. Recommend regular follow-up with every 6 mos exam for 2 years (2023) and then annual follow-up until at least 5 years from diagnosis (2026). She should have vaginal Pap smear, cytology only, every 12 mos. First would be due 2022.   - Return to clinic in 6 mos for exam with me. Then can transition to [facility] clinic.  - We discussed that smoking cessation could have an impact on both her risk of cancer recurrence and her risk of other HPV related dysplasias. She was strongly encouraged to work on tobacco cessation    -Pulmonary: tobacco use and asthma; however, difficulties in her case that were beyond expected for her age and overall state of health. Her case was complicated by difficulty with ventilation and oxygenation. She had high peak pressures on the vent and required 100% FiO2 to maintain sats above 80%. Anesthesia performed bronchoscopy through the ET tube intra-operatively with " no evidence of etiology. She was able to saturate appropriately after reversal of anesthetic and discharge to home. However, due to the unexpected challenges with maintaining her oxygenation, anesthesiologist recommended she have pulmonary evaluation post-operatively. I have placed referral. Appreciate recommendations or further testing as appropriate.     I spent a total of 20 min with Heron Gee. 10 min was in post-op care and discussion, the remaining 10 minutes was in discussion of recommended follow-up and treatment plan.    Fritz Barron MD     Gynecologic Oncology

## 2021-06-21 NOTE — NURSING NOTE
Oncology Rooming Note    June 21, 2021 12:33 PM   Heron Gee is a 36 year old female who presents for:    Chief Complaint   Patient presents with     Oncology Clinic Visit     Follow up     Initial Vitals: /87 (BP Location: Right arm, Patient Position: Sitting, Cuff Size: Adult Large)   Pulse 83   Temp 97.5  F (36.4  C) (Oral)   Resp 16   Ht 1.524 m (5')   Wt 95.1 kg (209 lb 11.2 oz)   LMP 06/01/2021   SpO2 97%   BMI 40.95 kg/m   Estimated body mass index is 40.95 kg/m  as calculated from the following:    Height as of this encounter: 1.524 m (5').    Weight as of this encounter: 95.1 kg (209 lb 11.2 oz). Body surface area is 2.01 meters squared.  Data Unavailable Comment: 3   Patient's last menstrual period was 06/01/2021.  Allergies reviewed: Yes  Medications reviewed: Yes    Medications: Medication refills not needed today.  Pharmacy name entered into [facility]:    [facility] - [place], [place] - [address] [address] [place] [address], [id number]  [facility] [place] - [address] [facility]  [place], [place] - [address] [place] [address] [address] [place] [address] [id number] [place], [place] - [address]    Clinical concerns: Headaches off/on since 06/17/21, increased fatigue, right ear pain, seen at urgent care on 06/20 dx with right middle ear infection, taking an antibiotic.  Dr. Barron was notified.      Taty Resendez, [facility]

## 2021-06-21 NOTE — LETTER
6/21/2021         RE: Heron Gee  [address] [facility] [address]        Dear Colleague,    Thank you for referring your patient, Heron Gee, to the [facility]. Please see a copy of my visit note below.    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 She is here today for pathology review and treatment recommendations.    She is overall feeling well. She is eating and drinking well. In general she has no significant diarrhea/constipation or bladder concerns. Her pain is minimal at this point. She doesn't raise any incisional concerns. She denies vaginal bleeding. She has headaches down her cheek and on the right side of her head. Just started antibiotics for ear infection yesterday. Otherwise no sinus pain, pressure, drainage. No other symptoms.    Of note, her case was complicated by difficulty with ventilation and oxygenation. She had high peak pressures on the vent and required 100% FiO2 to maintain sats above 80%. Anesthesia performed bronchoscopy through the ET tube intra-operatively with no evidence of etiology. She was able to saturate appropriately after reversal of anesthetic and discharge to home. However, due to the unexpected challenges with maintaining her oxygenation, anesthesiologist recommended she have pulmonary evaluation post-operatively.      Relevant history:  4/2019 ASCUS/HPV HR positive  10/2019: Pap negative, HPV HR positive  12/2019: ECC negative  1/29/2021: Pap ASCUS/HPV other HR positive  2/22/2021: Colposcopy with EUGENE 2 biopsy and neg ECC  3/15/2021: LEEP with 2.2mm invasive SCC of the cervix extending to endocervical margin, neg LVSI  4/15/2021:  CKC, ECC with no residual carcinoma  6/10/2021: Total laparoscopic hysterectomy, bilateral salpingectomy      Review of Systems:  As per HPI, otherwise non-contributory.     Past Surgical History:  Past Surgical History:   Procedure Laterality Date     APPENDECTOMY OPEN CHILD        SECTION  09/04/2019    x 2     COLPOSCOPY, CONIZATION, COMBINED N/A 4/15/2021    Procedure: Cold Knife Cone of the cervix;  Surgeon: Fritz Barron MD;  Location: [facility] OR     LAPAROSCOPIC HYSTERECTOMY TOTAL, SALPINGECTOMY BILATERAL Bilateral 6/10/2021    Procedure: Total laparoscopic hysterectomy, bilateral salpingectomy;  Surgeon: Fritz Barron MD;  Location: [facility] OR     LEEP TX, CERVICAL       NOSE SURGERY      Deviated septum        Physical Exam:  /87 (BP Location: Right arm, Patient Position: Sitting, Cuff Size: Adult Large)   Pulse 83   Temp 97.5  F (36.4  C) (Oral)   Resp 16   Ht 1.524 m (5')   Wt 95.1 kg (209 lb 11.2 oz)   LMP 2021   SpO2 97%   BMI 40.95 kg/m     General appearance: no acute distress, well groomed, sitting comfortably   GI: abdomen soft, incisions well healed    Pathology:  Lab Results   Component Value Date    PATH  04/15/2021     Patient Name: DEJAN ROBERT  MR#: [id number]  Specimen #: [id number]  Collected: 4/15/2021  Received: 2021  Reported: 2021 10:41  Ordering Phy(s): FRITZ BARRON    For improved result formatting, select 'View Enhanced Report Format' under   Linked Documents section.    SPECIMEN(S):  A: Cervical cone  B: Endocervical curettings    FINAL DIAGNOSIS:  A. CERVIX, CONE:  - Cervical tissue with stromal scarring and inflammatory changes,   consistent with healing of a recent surgery  site  - Epithelial reactive changes  - Negative for residual dysplasia or neoplasia    B. ENDOCERVIX, CURETTAGE:  - Endocervical mucosal fragments with no significant histologic   abnormality  - Unremarkable lower uterine segment endometrium  - Negative for dysplasia or  "neoplasia    I have personally reviewed all specimens and/or slides, including the   listed special stains, and used them  with my medical judgement to determine or confirm the final diagnosis.    Electronically signed out by:    Kathleen Squires M.D., PhD, Physicians    CLINICAL HISTORY:  Patient with history of invasive squamous cell carcinoma in a LEEP    GROSS:  A:  The specimen is received in formalin with proper patient   identification, labeled \"cervical cone stitch at  12:00\".  The specimen consists of a 2.1 x 1.7 x 1.3 cm oriented cervical   cone. There is a stitch designating  12:00 per requisition form and specimen container. The ectocervix is   white-pink, smooth, and glistening with  no definite lesions identified. The endocervical margin is inked black and   the stroma margin is inked blue.  The endocervical canal is tan-pink, smooth, and glistening with no   definite masses identified. The specimen is  sectioned and entirely submitted.    Summary of Sections:  A1 - 12:00 to 3:00  A2-A3 - 3:00 to 6:00  A4 - 6:00 to 9:00  A5 - 9:00 to 12:00    B:  The specimen is received in formalin with proper patient   identification, labeled \"endocervical  curettings\".  The specimen consists of 1.5 x 1.2 x 0.1 cm aggregate of   white-tan soft tissue and brown mucous.  The specimen is filtered, wrapped, and entirely submitted in cassette B1.   (Dictated by: Ivis Lerner  4/16/2021 11:22 AM)    MICROSCOPIC:  Microscopic examination was performed.    The technical component of this testing was completed at the [facility], with the professional component performed   at the [facility], [address] [place] [phone] ([phone])    CPT Codes:  A: 39428-WL0  B: 63958-RL5    COLLECTION SITE:  Client: [facility]  Location: [facility] (B)        PATH  " 03/15/2021     Patient Name: DEJAN ROBERT  MR#: [id number]  Specimen #: [id number]  Collected: 3/15/2021  Received: 3/16/2021  Reported: 3/19/2021 12:42  Ordering Phy(s): CEPHAS MAWUENA AGBEH    For improved result formatting, select 'View Enhanced Report Format' under   Linked Documents section.    SPECIMEN(S):  A: LEEP biopsy  B: Endocervical curettings    FINAL DIAGNOSIS:    A: Cervix, LEEP conization:  - Invasive, well-differentiated, squamous cell carcinoma arising in   background moderate dysplasia; invasion  measured at 2.2 mm; lesion measures 5 mm.  -Endocervical margin involved by neoplasm  -Ectocervical margin negative  -Please see synoptic reporting template included in this report    B: Endocervix, curettage:  -High-grade squamous intraepithelial lesion present    COMMENT:  As this result is not anticipated, I did try and contact Dr Agbeh directly   before signing out the case but was  not able to reach him so a clinic team member placed an electronic alert   message for him to call me about this  patient. I will follow up with him, as needed, after this case has been   signed out.    Report Name: Uterine Cervix - Excision       Status: Submitted Checklist Inst: 1      Last Updated By: Sarah Cox M.D., 03/19/2021 12:41:45  Part(s) Involved:  A: LEEP biopsy    Synoptic Report:    SPECIMEN    Procedure:        - Loop electrical excision procedure (LEEP) / large loop excision of   the        transformation zone (LLETZ)    TUMOR    Tumor Size: 0.5 Centimeters (cm)    Histologic Type:        - Squamous cell carcinoma, HPV-associated    Histologic Grade:        - G1: Well differentiated    Depth of Stromal Invasion (Millimeters):        2.2 mm    Lymphovascular Invasion:        - Not identified    MARGINS    Margin Status for Invasive Carcinoma:        - Invasive carcinoma present at margin      Margin(s) Involved by Invasive Carcinoma:          - Endocervical    Margin Status for HSIL/AIS:        " - High-grade squamous intraepithelial lesion (HSIL) present at   margin      Margin(s) Involved by HSIL:          - Endocervical    CAP eCC Agile Release Nov 2020    Electronically signed out by:    Sarah Cox M.D.    CLINICAL HISTORY:  Moderate dysplasia, EUGENE 2    GROSS:  The specimen is received in formalin with the proper patient   identification, labeled \"cervix\". The specimen  consists of a 1.9 x 1.8 x 1.4 cm annular mucosal covered tissue with a 0.8   cm os. The endocervical margin is  inked blue and the remaining radial margin is inked black. The specimen is   radially sectioned and entirely  submitted in 4 cassettes.    B. The specimen is received in formalin with the proper patient   identification, labeled \"post LEEP ECC\". The  specimen consists of a brush collection device containing a 1.0 cm   aggregate of tan mucoid substance admixed  with clotted blood. The specimen is filtered and entirely submitted in one   cassette. (Dictated by: ZAIN Crenshaw 3/16/2021 02:55 PM)    MICROSCOPIC:  A. Examination shows areas of surface high-grade squamous intraepithelial   lesion consistent with moderate  dysplasia. Severe dysplasia and carcinoma in situ are not evident.   Unexpectedly, however, and present on  tissue sections from blocks A3 and A4, there is a bulbous, downward   projecting squamous proliferation which in  areas shows irregular islands of epithelial cells with one or two cells in   the stroma.There is an associated  chronic inflammatory reaction. On H&E examination, initially, the findings   are of concern for invasive  squamous cell carcinoma. This is confirmed on p16 stain which is positive.   The slides have been seen in  intradepartmental consultation with universal agreement. Invasion is   measured at 2.2 mm. The invasive process  and overlying dysplastic one involve the endocervical sample margin.    B. A formal microscopic examination has been performed. This material has   also " "been seen in intradepartmental  consultation. A p16 stain was performed and supports the findings given in   the final diagnosis.    The technical component of this testing was completed at the [facility], with the professional component performed   at the [facility], [address] [place] [address]  [phone] ([phone])    CPT Codes:  A: 71891-KX4, 69230-AZY  B: 52939-HL4, 54837-HVU    COLLECTION SITE:  Client: [facility]  Location: [facility] (B)      PATH  02/22/2021     Patient Name: DEJAN ROBERT  MR#: [id number]  Specimen #: [id number]  Collected: 2/22/2021  Received: 2/24/2021  Reported: 2/25/2021 13:07  Ordering Phy(s): CEPHAS MAWUENA AGBEH    For improved result formatting, select 'View Enhanced Report Format' under   Linked Documents section.    SPECIMEN(S):  A: Endocervical curettings  B: Cervical biopsy, 12 o'clock    FINAL DIAGNOSIS:  A: Endocervix, curettings:  - Negative for dysplasia or malignancy.    B: Cervix, 12:00, biopsy:  - High-grade squamous intraepithelial lesion (EUGENE 2).    Electronically signed out by:    Yemi Cherry M.D.    GROSS:  A. The specimen is received in formalin with the proper patient   identification, labeled \"ECC\". The specimen  consists of a 1.3 cm aggregate of tan mucoid substance admixed with   clotted blood. The specimen is filtered  and entirely submitted in one cassette.    B. The specimen is received in formalin with the proper patient   identification, labeled \"cervical biopsy,  12:00\". The specimen consists of a 0.2 cm tan-white soft tissue fragment   admixed with a 1.0 cm aggregate of  tan mucoid substance. The specimen is filtered and entirely submitted in   one cassette. (Dictated by: ZAIN Crenshaw 2/24/2021 09:42 AM)    MICROSCOPIC:  A: Sections demonstrate glandular and squamous (transitional zone) mucosa.    There is no " "dysplasia or  malignancy.    B: Sections demonstrate squamous and glandular (transitional zone) mucosa.    There is squamous dysplasia.  For  interpretative support, stains for p16 are performed (on B1) with adequate   controls.  The p16 stain  demonstrates areas of \"strong and diffuse\" positivity in the dysplastic   squamous epithelium, a finding which  supports high-grade squamous intraepithelial lesion (EUGENE 2).    Intradepartmental consultation was obtained.    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     Plan:  - Squamous cell carcinoma of the cervix: reviewed the diagnosis and that it was caught very early which makes the risk of recurrence quite low. Recommend regular follow-up with every 6 mos exam for 2 years (2023) and then annual follow-up until at least 5 years from diagnosis (2026). She should have vaginal Pap smear, cytology only, every 12 mos. First would be due 2022.   - Return to clinic in 6 mos for exam with me. Then can transition to [facility] clinic.  - We discussed that smoking cessation could have an impact on both her risk of cancer recurrence and her risk of other HPV related dysplasias. She was strongly encouraged to work on tobacco cessation    -Pulmonary: tobacco use and asthma; however, difficulties in her case that were beyond expected for her age and overall state of health. Her " case was complicated by difficulty with ventilation and oxygenation. She had high peak pressures on the vent and required 100% FiO2 to maintain sats above 80%. Anesthesia performed bronchoscopy through the ET tube intra-operatively with no evidence of etiology. She was able to saturate appropriately after reversal of anesthetic and discharge to home. However, due to the unexpected challenges with maintaining her oxygenation, anesthesiologist recommended she have pulmonary evaluation post-operatively. I have placed referral. Appreciate recommendations or further testing as appropriate.     I spent a total of 20 min with Heron Gee. 10 min was in post-op care and discussion, the remaining 10 minutes was in discussion of recommended follow-up and treatment plan.    Fritz Barron MD     Gynecologic Oncology       Again, thank you for allowing me to participate in the care of your patient.        Sincerely,        Fritz Barron MD

## 2021-06-21 NOTE — PATIENT INSTRUCTIONS
Return in 6 mos for surveillance exam with pelvic exam.     Call sooner if any questions or concerns.    Continue post-surgery restrictions until 6 weeks.    I will arrange for lung follow-up.

## 2021-06-22 ENCOUNTER — APPOINTMENT (OUTPATIENT)
Dept: CT IMAGING | Facility: CLINIC | Age: 36
End: 2021-06-22
Attending: EMERGENCY MEDICINE
Payer: COMMERCIAL

## 2021-06-22 ENCOUNTER — HOSPITAL ENCOUNTER (EMERGENCY)
Facility: CLINIC | Age: 36
Discharge: HOME | End: 2021-06-23
Attending: EMERGENCY MEDICINE | Admitting: EMERGENCY MEDICINE
Payer: COMMERCIAL

## 2021-06-22 DIAGNOSIS — R51.9 ACUTE NONINTRACTABLE HEADACHE, UNSPECIFIED HEADACHE TYPE: ICD-10-CM

## 2021-06-22 LAB
ALBUMIN SERPL-MCNC: 3.6 G/DL (ref 3.4–5)
ALP SERPL-CCNC: 81 U/L (ref 40–150)
ALT SERPL W P-5'-P-CCNC: 34 U/L (ref 0–50)
ANION GAP SERPL CALCULATED.3IONS-SCNC: 7 MMOL/L (ref 3–14)
AST SERPL W P-5'-P-CCNC: 17 U/L (ref 0–45)
BILIRUB SERPL-MCNC: 0.2 MG/DL (ref 0.2–1.3)
BUN SERPL-MCNC: 14 MG/DL (ref 7–30)
CALCIUM SERPL-MCNC: 8.6 MG/DL (ref 8.5–10.1)
CHLORIDE SERPL-SCNC: 107 MMOL/L (ref 94–109)
CO2 SERPL-SCNC: 24 MMOL/L (ref 20–32)
CREAT SERPL-MCNC: 0.72 MG/DL (ref 0.52–1.04)
GFR SERPL CREATININE-BSD FRML MDRD: >90 ML/MIN/{1.73_M2}
GLUCOSE SERPL-MCNC: 92 MG/DL (ref 70–99)
POTASSIUM SERPL-SCNC: 4.2 MMOL/L (ref 3.4–5.3)
PROT SERPL-MCNC: 7.4 G/DL (ref 6.8–8.8)
SODIUM SERPL-SCNC: 138 MMOL/L (ref 133–144)

## 2021-06-22 PROCEDURE — 85025 COMPLETE CBC W/AUTO DIFF WBC: CPT | Performed by: EMERGENCY MEDICINE

## 2021-06-22 PROCEDURE — 96374 THER/PROPH/DIAG INJ IV PUSH: CPT | Performed by: EMERGENCY MEDICINE

## 2021-06-22 PROCEDURE — 250N000011 HC RX IP 250 OP 636: Performed by: EMERGENCY MEDICINE

## 2021-06-22 PROCEDURE — 70450 CT HEAD/BRAIN W/O DYE: CPT

## 2021-06-22 PROCEDURE — 80053 COMPREHEN METABOLIC PANEL: CPT | Performed by: EMERGENCY MEDICINE

## 2021-06-22 PROCEDURE — 99285 EMERGENCY DEPT VISIT HI MDM: CPT | Mod: 25 | Performed by: EMERGENCY MEDICINE

## 2021-06-22 PROCEDURE — 99285 EMERGENCY DEPT VISIT HI MDM: CPT | Performed by: EMERGENCY MEDICINE

## 2021-06-22 RX ORDER — KETOROLAC TROMETHAMINE 15 MG/ML
15 INJECTION, SOLUTION INTRAMUSCULAR; INTRAVENOUS ONCE
Status: COMPLETED | OUTPATIENT
Start: 2021-06-22 | End: 2021-06-22

## 2021-06-22 RX ADMIN — KETOROLAC TROMETHAMINE 15 MG: 15 INJECTION, SOLUTION INTRAMUSCULAR; INTRAVENOUS at 23:15

## 2021-06-23 VITALS
HEART RATE: 65 BPM | SYSTOLIC BLOOD PRESSURE: 120 MMHG | WEIGHT: 208 LBS | RESPIRATION RATE: 16 BRPM | DIASTOLIC BLOOD PRESSURE: 75 MMHG | OXYGEN SATURATION: 98 % | BODY MASS INDEX: 40.62 KG/M2 | TEMPERATURE: 99 F

## 2021-06-23 LAB
BASOPHILS # BLD AUTO: 0 10E9/L (ref 0–0.2)
BASOPHILS NFR BLD AUTO: 0 %
DIFFERENTIAL METHOD BLD: ABNORMAL
EOSINOPHIL # BLD AUTO: 0.6 10E9/L (ref 0–0.7)
EOSINOPHIL NFR BLD AUTO: 5 %
ERYTHROCYTE [DISTWIDTH] IN BLOOD BY AUTOMATED COUNT: 12.8 % (ref 10–15)
HCT VFR BLD AUTO: 43.6 % (ref 35–47)
HGB BLD-MCNC: 14.9 G/DL (ref 11.7–15.7)
LYMPHOCYTES # BLD AUTO: 5.5 10E9/L (ref 0.8–5.3)
LYMPHOCYTES NFR BLD AUTO: 43 %
MCH RBC QN AUTO: 30.6 PG (ref 26.5–33)
MCHC RBC AUTO-ENTMCNC: 34.2 G/DL (ref 31.5–36.5)
MCV RBC AUTO: 90 FL (ref 78–100)
MONOCYTES # BLD AUTO: 0.3 10E9/L (ref 0–1.3)
MONOCYTES NFR BLD AUTO: 2 %
NEUTROPHILS # BLD AUTO: 6.4 10E9/L (ref 1.6–8.3)
NEUTROPHILS NFR BLD AUTO: 50 %
PLATELET # BLD AUTO: 216 10E9/L (ref 150–450)
RBC # BLD AUTO: 4.87 10E12/L (ref 3.8–5.2)
WBC # BLD AUTO: 12.7 10E9/L (ref 4–11)

## 2021-06-23 ASSESSMENT — ENCOUNTER SYMPTOMS
FEVER: 0
HEADACHES: 1
ABDOMINAL PAIN: 0
SHORTNESS OF BREATH: 0

## 2021-06-23 NOTE — ED NOTES
Headache on right side of head. Sx for 6 days. Pain is not constant but intermittent shooting pains. Pt on antibiotic for ear infection. Pt states headache not better. Pt taking tylenol and ibuprofen for headache. No fever or chills. No hx of migraines, though sometimes has sinus headache. No photophobia. No nausea or vomiting.

## 2021-06-23 NOTE — ED TRIAGE NOTES
Pt presents with right sided headache.  Was seen in UC on Sunday.  Not improving.  This is not typical for the patient.  Denies any vision changes.

## 2021-06-23 NOTE — ED PROVIDER NOTES
History     Chief Complaint   Patient presents with     Headache     Right sided     HPI  Heron Gee is a 36 year old female w presents to the emergency department with concerns regarding headache.  Patient symptoms have been present over the past number of days, primarily 6 days.  This is right-sided, located behind the right ear, with some radiation towards the right temple region.  No pain on the left side of the head.  No visual changes.  No weakness of the arms, or legs.  No numbness, or tingling anywhere in the body.  No fever.  No vomiting.  No trauma.  Notably, patient was recently treated for potential otitis media with amoxicillin.  Continues on amoxicillin.  No sore throat.  No prior history of headaches.    I reviewed previous medical records.  Patient has history of squamous cell carcinoma of the cervix.  Ultimately has had treatment with laparoscopic hysterectomy that was performed on Kassie 10.  Patient did have some postoperative hypoxia.    Patient during her oncology visit was noted to have headaches on the right side of her head.    Allergies:  No Known Allergies    Problem List:    Patient Active Problem List    Diagnosis Date Noted     Squamous cell carcinoma of cervix, stage 1 (H) 2021     Priority: Medium     Added automatically from request for surgery [id number]       Previous  delivery, antepartum condition or complication 2019     Priority: Medium     Supervision of normal first pregnancy 2015     Priority: Medium     Obesity 2015     Priority: Medium     Family history of diabetes mellitus 2015     Priority: Medium        Past Medical History:    Past Medical History:   Diagnosis Date     Abnormal Pap smear of cervix      Cervical high risk HPV (human papillomavirus) test positive      Cervix cancer (H)      Exercise-induced asthma        Past Surgical History:    Past Surgical History:   Procedure Laterality Date     APPENDECTOMY OPEN CHILD         SECTION  09/04/2019    x 2     COLPOSCOPY, CONIZATION, COMBINED N/A 4/15/2021    Procedure: Cold Knife Cone of the cervix;  Surgeon: Fritz Barron MD;  Location: [facility] OR     LAPAROSCOPIC HYSTERECTOMY TOTAL, SALPINGECTOMY BILATERAL Bilateral 6/10/2021    Procedure: Total laparoscopic hysterectomy, bilateral salpingectomy;  Surgeon: Fritz Barron MD;  Location: [facility] OR     LEEP TX, CERVICAL       NOSE SURGERY      Deviated septum       Family History:    Family History   Problem Relation Age of Onset     Diabetes Brother      Cancer Paternal Great-Grandmother      Breast Cancer Paternal Great-Grandmother      Hypertension No family hx of      Ovarian Cancer No family hx of      Cerebrovascular Disease No family hx of      Thyroid Disease No family hx of        Social History:  Marital Status:  Single [1]  Social History     Tobacco Use     Smoking status: Current Every Day Smoker     Packs/day: 0.50     Years: 13.00     Pack years: 6.50     Types: Cigarettes     Smokeless tobacco: Never Used   Substance Use Topics     Alcohol use: Yes     Frequency: Monthly or less     Drug use: No        Medications:    acetaminophen (TYLENOL) 325 MG tablet  albuterol (PROAIR HFA/PROVENTIL HFA/VENTOLIN HFA) 108 (90 Base) MCG/ACT inhaler  amoxicillin (AMOXIL) 875 MG tablet  fexofenadine (ALLEGRA) 180 MG tablet  ibuprofen (ADVIL/MOTRIN) 600 MG tablet  Probiotic Product ([employer] PROBIOTIC) CHEW          Review of Systems   Constitutional: Negative for fever.   Respiratory: Negative for shortness of breath.    Cardiovascular: Negative for chest pain.   Gastrointestinal: Negative for abdominal pain.   Neurological: Positive for headaches.   All other systems reviewed and are negative.      Physical Exam   BP: (!) 146/97  Pulse: 97  Temp: 99  F (37.2  C)  Resp: 16  Weight: 94.3 kg (208 lb)  SpO2: 98 %      Physical Exam  /75   Pulse 65   Temp 99  F (37.2  C) (Oral)   Resp 16   Wt 94.3 kg (208 lb)   LMP 2021    SpO2 98%   BMI 40.62 kg/m    General: alert, interactive, in no apparent distress  Head: atraumatic  Nose: no rhinorrhea or epistaxis  Ears: no external auditory canal discharge or bleeding.  Tympanic membranes are fairly unremarkable.  Slight fluid collection of the right TM.  Eyes: Sclera nonicteric. Conjunctiva noninjected. PERRL, EOMI  Mouth: no tonsillar erythema, edema, or exudate  Neck: supple, no palp LAD  Lungs: CTAB  CV: RRR, S1/S2; peripheral pulses palpable and symmetric  Abdomen: soft, nt, nd, no guarding or rebound. Positive bowel sounds  Extremities: no cyanosis or edema  Skin: no rash or diaphoresis  Neuro:   strength 5/5 in UE and LEs bilaterally, sensation intact to light touch in UE and LEs bilaterally;         ED Course        Procedures               Critical Care time:  none               Results for orders placed or performed during the hospital encounter of 06/22/21 (from the past 24 hour(s))   CBC with platelets differential   Result Value Ref Range    WBC 12.7 (H) 4.0 - 11.0 10e9/L    RBC Count 4.87 3.8 - 5.2 10e12/L    Hemoglobin 14.9 11.7 - 15.7 g/dL    Hematocrit 43.6 35.0 - 47.0 %    MCV 90 78 - 100 fl    MCH 30.6 26.5 - 33.0 pg    MCHC 34.2 31.5 - 36.5 g/dL    RDW 12.8 10.0 - 15.0 %    Platelet Count 216 150 - 450 10e9/L    Diff Method Manual Differential     % Neutrophils 50.0 %    % Lymphocytes 43.0 %    % Monocytes 2.0 %    % Eosinophils 5.0 %    % Basophils 0.0 %    Absolute Neutrophil 6.4 1.6 - 8.3 10e9/L    Absolute Lymphocytes 5.5 (H) 0.8 - 5.3 10e9/L    Absolute Monocytes 0.3 0.0 - 1.3 10e9/L    Absolute Eosinophils 0.6 0.0 - 0.7 10e9/L    Absolute Basophils 0.0 0.0 - 0.2 10e9/L   Comprehensive metabolic panel   Result Value Ref Range    Sodium 138 133 - 144 mmol/L    Potassium 4.2 3.4 - 5.3 mmol/L    Chloride 107 94 - 109 mmol/L    Carbon Dioxide 24 20 - 32 mmol/L    Anion Gap 7 3 - 14 mmol/L    Glucose 92 70 - 99 mg/dL    Urea Nitrogen 14 7 - 30 mg/dL    Creatinine 0.72 0.52  - 1.04 mg/dL    GFR Estimate >90 >60 mL/min/[1.73_m2]    GFR Estimate If Black >90 >60 mL/min/[1.73_m2]    Calcium 8.6 8.5 - 10.1 mg/dL    Bilirubin Total 0.2 0.2 - 1.3 mg/dL    Albumin 3.6 3.4 - 5.0 g/dL    Protein Total 7.4 6.8 - 8.8 g/dL    Alkaline Phosphatase 81 40 - 150 U/L    ALT 34 0 - 50 U/L    AST 17 0 - 45 U/L   Head CT w/o contrast    Narrative    EXAM: CT HEAD W/O CONTRAST  LOCATION: [facility]  DATE/TIME: 6/22/2021 11:21 PM    INDICATION: Headache, classic migraine  COMPARISON: None.  TECHNIQUE: Routine CT Head without IV contrast. Multiplanar reformats. Dose reduction techniques were used.    FINDINGS:  INTRACRANIAL CONTENTS: No intracranial hemorrhage, extraaxial collection, or mass effect.  No CT evidence of acute infarct. Normal parenchymal attenuation. Normal ventricles and sulci.     VISUALIZED ORBITS/SINUSES/MASTOIDS: No intraorbital abnormality. No paranasal sinus mucosal disease. No middle ear or mastoid effusion.    BONES/SOFT TISSUES: No acute abnormality.      Impression    IMPRESSION:  1.  No acute intracranial process.       Medications   ketorolac (TORADOL) injection 15 mg (15 mg Intravenous Given 6/22/21 8504)       Assessments & Plan (with Medical Decision Making)  36 year old female presenting to the emergency department with concerns regarding right-sided headache, present over the past 6 days.  This is intermittent in nature, without aggravating or alleviating factors.  Pain seems to worsen, coming and going, causing sharp, stabbing pains of the right mastoid area, in addition to the right temporal region.  Has recently been started on amoxicillin for otitis media.  Ears actually look fairly good.  Patient does not have history of frequent migraine headaches.  There has been no trauma to suggest posttraumatic cause.  No fever or other infectious symptoms to suggest infectious etiology.  No neck stiffness, or meningeal signs.    At this point, uncertain exact cause,  however favor tension versus cluster type headache.  Patient will be treated with Toradol in the ED.  No further recurrent episodes of pain.  Recommended outpatient over-the-counter medications, and follow-up in clinic as needed.  Encouraged to be seen if new, or worsening symptoms develop.     I have reviewed the nursing notes.    I have reviewed the findings, diagnosis, plan and need for follow up with the patient.       Discharge Medication List as of 6/22/2021 11:57 PM          Final diagnoses:   Acute nonintractable headache, unspecified headache type       6/22/2021   [facility] EMERGENCY DEPT     Pillo Morrow MD  06/23/21 [id number]

## 2021-06-24 ENCOUNTER — OFFICE VISIT (OUTPATIENT)
Dept: FAMILY MEDICINE | Facility: CLINIC | Age: 36
End: 2021-06-24
Payer: COMMERCIAL

## 2021-06-24 VITALS
BODY MASS INDEX: 40.62 KG/M2 | HEART RATE: 85 BPM | OXYGEN SATURATION: 99 % | SYSTOLIC BLOOD PRESSURE: 122 MMHG | TEMPERATURE: 98.7 F | WEIGHT: 208 LBS | DIASTOLIC BLOOD PRESSURE: 87 MMHG

## 2021-06-24 DIAGNOSIS — M54.2 NECK PAIN: Primary | ICD-10-CM

## 2021-06-24 DIAGNOSIS — G44.209 TENSION-TYPE HEADACHE, NOT INTRACTABLE, UNSPECIFIED CHRONICITY PATTERN: ICD-10-CM

## 2021-06-24 PROCEDURE — 99214 OFFICE O/P EST MOD 30 MIN: CPT | Performed by: FAMILY MEDICINE

## 2021-06-24 RX ORDER — BUTALBITAL, ACETAMINOPHEN AND CAFFEINE 50; 325; 40 MG/1; MG/1; MG/1
1 TABLET ORAL 3 TIMES DAILY PRN
Qty: 15 TABLET | Refills: 1 | Status: SHIPPED | OUTPATIENT
Start: 2021-06-24 | End: 2021-09-13

## 2021-06-24 RX ORDER — TIZANIDINE 2 MG/1
2 TABLET ORAL 3 TIMES DAILY PRN
Qty: 30 TABLET | Refills: 1 | Status: SHIPPED | OUTPATIENT
Start: 2021-06-24 | End: 2023-08-03

## 2021-06-24 NOTE — PROGRESS NOTES
SUBJECTIVE:  Heron Gee, a 36 year old female scheduled an appointment to discuss the following issues:  Follow up HA, right ear pain- was seen in urgent care 6/20 & 6/22 and prescribe Amoxicillin - still feeling pain  Patient was seen in ER and had normal ct head and given toradol.   Not much AOM or headaches in past.   No sinus congestion. Behind right ear/temple area past week. Sharp at times.   No blurry vision or nausea.   Sleep difficult to lay on right side. Ice pack.   Ibuprofen/tylenol - daily. Worse after med where off. No focal weakness/tingling.   No teeth grinding. Emotionally ok but had lap hyst with cervix for cervical cancer.   Headaches one week after.   Medical, social, surgical, and family histories reviewed.    ROS:  All other ROS negative.   OBJECTIVE:  /87   Pulse 85   Temp 98.7  F (37.1  C) (Tympanic)   Wt 94.3 kg (208 lb)   LMP 06/01/2021   SpO2 99%   BMI 40.62 kg/m    EXAM:  GENERAL APPEARANCE: healthy, alert and no distress  EYES: EOMI,  PERRL  HENT: ear canals and TM's normal and nose and mouth without ulcers or lesions  NECK: no adenopathy, no asymmetry, masses, or scars and thyroid normal to palpation  NECK: tenderness right base of neck behind pinna. No pain with pulling pinna  RESP: lungs clear to auscultation - no rales, rhonchi or wheezes  CV: regular rates and rhythm, normal S1 S2, no S3 or S4 and no murmur, click or rub -  ABDOMEN:  soft, nontender, no HSM or masses and bowel sounds normal  MS: extremities normal- no gross deformities noted, no evidence of inflammation in joints, FROM in all extremities.  SKIN: no suspicious lesions or rashes  NEURO: Normal strength and tone, sensory exam grossly normal, mentation intact and speech normal  PSYCH: mentation appears normal and affect normal/bright  LYMPHATICS: No axillary, cervical or supraclavicular nodes    ASSESSMENT / PLAN:  (M54.2) Neck pain  (primary encounter diagnosis)  Comment: likely source of  headache/ear pain. TM ok  Plan: butalbital-acetaminophen-caffeine (ESGIC)         -40 MG tablet, tiZANidine (ZANAFLEX) 2 MG        tablet, Orthopedic  Referral        Reveiwed risks and side effects of medication  RANGE OF MOTION/stretching. Heat. Follow-up sports med neck week if not improving overall.     (G44.209) Tension-type headache, not intractable, unspecified chronicity pattern  Comment: likely from neck and maybe rebound headache  Plan: butalbital-acetaminophen-caffeine (ESGIC)         -40 MG tablet, tiZANidine (ZANAFLEX) 2 MG        tablet        STOP tylenol/ibuprofen. Reveiwed risks and side effects of medication  Consider neurology input. To ER if focal weakness/tingling/worsening headaches/blurry vision/etc. Call/email with questions/concerns   Expected course and warning signs reviewed.    Jin Yi MD

## 2021-06-24 NOTE — PROGRESS NOTES
Follow up HA, right ear pain- was seen in urgent care 6/20 & 6/22 and prescribe Amoxicillin - still feeling pain

## 2021-06-25 ASSESSMENT — ASTHMA QUESTIONNAIRES: ACT_TOTALSCORE: 22

## 2021-07-06 ENCOUNTER — PATIENT OUTREACH (OUTPATIENT)
Dept: OBGYN | Facility: CLINIC | Age: 36
End: 2021-07-06

## 2021-07-19 DIAGNOSIS — J45.20 MILD INTERMITTENT ASTHMA WITHOUT COMPLICATION: Primary | ICD-10-CM

## 2021-08-25 NOTE — PROGRESS NOTES
"    Assessment & Plan     Diarrhea, unspecified type    - Clostridium difficile Toxin B PCR; Future  - Enteric Bacteria and Virus Panel by [facility]ENCER Stool; Future  - Ova and Parasite Exam Routine; Future  - Adult Gastro Ref - Procedure Only; Future  - loperamide (IMODIUM A-D) 2 MG tablet; Take 1 tablet (2 mg) by mouth 4 times daily as needed for diarrhea    Squamous cell carcinoma of cervix, stage 1 (H)    - Adult Gastro Ref - Procedure Only; Future    Hx of human papillomavirus infection    - Adult Gastro Ref - Procedure Only; Future    Encounter for hepatitis C screening test for low risk patient    - Hepatitis C Screen Reflex to HCV RNA Quant and Genotype; Future  - Hepatitis C Screen Reflex to HCV RNA Quant and Genotype    Other fatigue    - CBC with platelets and differential; Future  - Iron and iron binding capacity; Future  - Ferritin; Future  - TSH with free T4 reflex; Future  - Vitamin D Deficiency; Future  - Vitamin D Deficiency  - TSH with free T4 reflex  - Ferritin  - Iron and iron binding capacity  - CBC with platelets and differential    Tobacco use disorder    - nicotine (NICODERM CQ) 21 MG/24HR 24 hr patch; Place 1 patch onto the skin every 24 hours Use first  - nicotine (NICODERM CQ) 14 MG/24HR 24 hr patch; Place 1 patch onto the skin every 24 hours Use second  - nicotine (NICODERM CQ) 7 MG/24HR 24 hr patch; Place 1 patch onto the skin every 24 hours  - nicotine (NICOTROL) 10 MG inhaler; Use 1 cartridge as needed for urge to smoke by puffing over course of 20min.  Use 6-16 cart/day; reduce number of cart/day over 6-12 weeks.    20 minutes spent on the date of the encounter doing chart review, history and exam, documentation and further activities per the note     BMI:   Estimated body mass index is 41.15 kg/m  as calculated from the following:    Height as of this encounter: 1.515 m (4' 11.65\").    Weight as of this encounter: 94.4 kg (208 lb 3.2 oz).   Weight management plan: Discussed healthy diet and " "exercise guidelines    See Patient Instructions: discussed scheduling colonoscopy. We will let you know your lab results and if abnormal a treatment plan. Keep trying to quit smoking!!  Follow up as needed.     Return in about 4 weeks (around 9/23/2021), or if symptoms worsen or fail to improve.    JORDI Welsh  [facility] ROSE Shelby is a 36 year old who presents for the following health issues     HPI   GI Issues     Onset: 2019- since her son was born- loose runny stools multiple times per day. No abdominal pain. Hx of cervical cancer requiring hyst d/t HPV. Still has gallbladder. No blood in stools.  She has not had a colonoscopy. She is a smoker and would like help to quit at this time. She reports daytime fatigue, is a stay at home mom, feels she has to nap when her kids do.     Description: soft/loose stools    Intensity: constant, multiple times a day    Progression of Symptoms: improving    Accompanying Signs & Symptoms:  Nausea: no  Vomiting (bloody?): no  Abdominal Pain: no  Black-Tarry stools: no:  Bloody stools: no    History:   Previous ulcers: no    Precipitating factors:   Caffeine use: YES  Alcohol use: YES  NSAID/Aspirin use: YES  Tobacco use: YES  Worse with rich foods.    Treatments Tried: probiotic          Review of Systems   Constitutional, HEENT, cardiovascular, pulmonary, GI, , musculoskeletal, neuro, skin, endocrine and psych systems are negative, except as otherwise noted.      Objective    /83   Pulse 105   Temp 98.5  F (36.9  C) (Tympanic)   Resp 20   Ht 1.515 m (4' 11.65\")   Wt 94.4 kg (208 lb 3.2 oz)   LMP 06/01/2021   SpO2 96%   BMI 41.15 kg/m    Body mass index is 41.15 kg/m .  Physical Exam   GENERAL: healthy, alert and no distress  RESP: lungs clear to auscultation - no rales, rhonchi or wheezes  CV: regular rate and rhythm, normal S1 S2, no S3 or S4, no murmur, click or rub, no peripheral edema and peripheral pulses " strong  ABDOMEN: soft, nontender, no hepatosplenomegaly, no masses and bowel sounds normal  MS: no gross musculoskeletal defects noted, no edema, no CVA tenderness  SKIN: no suspicious rashes  PSYCH: mentation appears normal, affect normal/bright    See orders

## 2021-08-26 ENCOUNTER — OFFICE VISIT (OUTPATIENT)
Dept: FAMILY MEDICINE | Facility: CLINIC | Age: 36
End: 2021-08-26
Payer: COMMERCIAL

## 2021-08-26 ENCOUNTER — TELEPHONE (OUTPATIENT)
Dept: OBGYN | Facility: CLINIC | Age: 36
End: 2021-08-26

## 2021-08-26 VITALS
SYSTOLIC BLOOD PRESSURE: 135 MMHG | HEIGHT: 60 IN | RESPIRATION RATE: 20 BRPM | TEMPERATURE: 98.5 F | OXYGEN SATURATION: 96 % | DIASTOLIC BLOOD PRESSURE: 83 MMHG | BODY MASS INDEX: 40.88 KG/M2 | WEIGHT: 208.2 LBS | HEART RATE: 105 BPM

## 2021-08-26 DIAGNOSIS — R53.83 OTHER FATIGUE: ICD-10-CM

## 2021-08-26 DIAGNOSIS — Z11.59 ENCOUNTER FOR HEPATITIS C SCREENING TEST FOR LOW RISK PATIENT: ICD-10-CM

## 2021-08-26 DIAGNOSIS — R19.7 DIARRHEA, UNSPECIFIED TYPE: Primary | ICD-10-CM

## 2021-08-26 DIAGNOSIS — E66.01 MORBID OBESITY (H): ICD-10-CM

## 2021-08-26 DIAGNOSIS — Z86.19 HX OF HUMAN PAPILLOMAVIRUS INFECTION: ICD-10-CM

## 2021-08-26 DIAGNOSIS — F17.200 TOBACCO USE DISORDER: ICD-10-CM

## 2021-08-26 DIAGNOSIS — C53.9 SQUAMOUS CELL CARCINOMA OF CERVIX, STAGE 1 (H): ICD-10-CM

## 2021-08-26 LAB
BASOPHILS # BLD AUTO: 0.1 10E3/UL (ref 0–0.2)
BASOPHILS NFR BLD AUTO: 1 %
EOSINOPHIL # BLD AUTO: 0.3 10E3/UL (ref 0–0.7)
EOSINOPHIL NFR BLD AUTO: 3 %
ERYTHROCYTE [DISTWIDTH] IN BLOOD BY AUTOMATED COUNT: 13.5 % (ref 10–15)
HCT VFR BLD AUTO: 45.1 % (ref 35–47)
HGB BLD-MCNC: 15.3 G/DL (ref 11.7–15.7)
LYMPHOCYTES # BLD AUTO: 3.7 10E3/UL (ref 0.8–5.3)
LYMPHOCYTES NFR BLD AUTO: 33 %
MCH RBC QN AUTO: 30.3 PG (ref 26.5–33)
MCHC RBC AUTO-ENTMCNC: 33.9 G/DL (ref 31.5–36.5)
MCV RBC AUTO: 89 FL (ref 78–100)
MONOCYTES # BLD AUTO: 0.7 10E3/UL (ref 0–1.3)
MONOCYTES NFR BLD AUTO: 7 %
NEUTROPHILS # BLD AUTO: 6.3 10E3/UL (ref 1.6–8.3)
NEUTROPHILS NFR BLD AUTO: 57 %
PLATELET # BLD AUTO: 192 10E3/UL (ref 150–450)
RBC # BLD AUTO: 5.05 10E6/UL (ref 3.8–5.2)
WBC # BLD AUTO: 11.2 10E3/UL (ref 4–11)

## 2021-08-26 PROCEDURE — 82728 ASSAY OF FERRITIN: CPT | Performed by: NURSE PRACTITIONER

## 2021-08-26 PROCEDURE — 99214 OFFICE O/P EST MOD 30 MIN: CPT | Performed by: NURSE PRACTITIONER

## 2021-08-26 PROCEDURE — 82306 VITAMIN D 25 HYDROXY: CPT | Performed by: NURSE PRACTITIONER

## 2021-08-26 PROCEDURE — 83550 IRON BINDING TEST: CPT | Performed by: NURSE PRACTITIONER

## 2021-08-26 PROCEDURE — 86803 HEPATITIS C AB TEST: CPT | Performed by: NURSE PRACTITIONER

## 2021-08-26 PROCEDURE — 84443 ASSAY THYROID STIM HORMONE: CPT | Performed by: NURSE PRACTITIONER

## 2021-08-26 PROCEDURE — 85025 COMPLETE CBC W/AUTO DIFF WBC: CPT | Performed by: NURSE PRACTITIONER

## 2021-08-26 PROCEDURE — 36415 COLL VENOUS BLD VENIPUNCTURE: CPT | Performed by: NURSE PRACTITIONER

## 2021-08-26 RX ORDER — LOPERAMIDE HYDROCHLORIDE 2 MG/1
2 TABLET ORAL 4 TIMES DAILY PRN
Qty: 56 TABLET | Refills: 0 | Status: SHIPPED | OUTPATIENT
Start: 2021-08-26 | End: 2021-09-13

## 2021-08-26 RX ORDER — NICOTINE 21 MG/24HR
1 PATCH, TRANSDERMAL 24 HOURS TRANSDERMAL EVERY 24 HOURS
Qty: 14 PATCH | Refills: 0 | Status: SHIPPED | OUTPATIENT
Start: 2021-08-26 | End: 2023-08-03

## 2021-08-26 ASSESSMENT — MIFFLIN-ST. JEOR: SCORE: 1560.27

## 2021-08-26 ASSESSMENT — PAIN SCALES - GENERAL: PAINLEVEL: NO PAIN (0)

## 2021-08-26 NOTE — TELEPHONE ENCOUNTER
Prior authorization required for : Nicotrol 10mg inhaler  Insurance plan: [employer]  Patient Id: [id number]  Bin Number:[id number]  Pcn: [id number]  Help Desk Number:  [phone]    Please fax over an alternative medication to the pharmacy or if you are going to pursue a prior authorization please contact the pharmacy and patient when approved. Thanks!    Jane Naranjo [facility]

## 2021-08-26 NOTE — PATIENT INSTRUCTIONS
Patient Education   Coping with Diarrhea  What is diarrhea?  If you have loose, watery bowel movements at least three times a day, you have diarrhea. You may also have gas and stomach cramps.  Emotional upset, infection and bad reactions to food may make diarrhea worse.  How is it treated?  Severe diarrhea can be treated with medicine, such as Imodium and Lomotil. These slow the digestive tract and reduce the amount of fluid lost in bowel movements. Your care team can tell you if medicine is right for you.  What else can I do to treat or prevent diarrhea?    Avoid:  ? Fried, fatty, greasy, spicy or very sweet foods  ? Caffeine and alcohol  ? High-fiber foods such as whole grains, raw vegetables, unpeeled fresh fruits, dried fruits, dried beans and peas, nuts, seeds and popcorn  ? Chewing gum and sugar-free candies (these often contain a sugar alcohol that may increase diarrhea)  ? Gas-forming foods such as broccoli, cauliflower, brussels sprouts and carbonated (fizzy) drinks.    Eat smaller amounts of food, but eat more often. Serve foods cold or at room temperature.    Drink six to eight 8-ounce glasses of fluid each day, such as:  ? Fruit juice, watered-down (half water)  ? Broth or gelatin  ? Popsicles  ? Sports drinks or flat decaf soda pop (leave it open for at least 10 minutes before drinking).    Limit milk products to two low-fat servings daily.    Increase your potassium with watered-down orange juice, sports drinks, potatoes without skin, bananas or tomato products.    Increase your sodium with soups and broths, sports drinks, crackers and pretzels.    Rest and avoid stress.    Weigh yourself daily. Keep a record of your weight and how often you pass loose stools.    Take warm baths to keep yourself clean. Tell your doctor if your rectum is red, painful or swollen.    Think about buying a skin barrier (such as Aquaphor) at the drug store. This can help protect the skin around your rectum from irritation  and skin breakdown. Use it after each bowel movement.    If diarrhea is severe, have only clear liquids (liquids that you can see through) until it stops. Once it stops, slowly return to your normal diet.  When should I call my care team?  Call your care team if:    You follow the steps listed here, but your diarrhea does not improve within 24 hours.    You have more than six loose stools in one day.    You have sudden, severe belly pain.    You have been unable to eat for over two days.    You have fever or dizziness along with diarrhea.    You notice blood in your stool or you have black, tarry stools.  Food group Recommended foods Foods to avoid   Meats, eggs and cheese Broiled or baked lean meat, fish, chicken or turkey (no skin). Eggs, well-cooked. Beans. Chicken or turkey with the skin.   Breads and starches Breads, rolls and pastas made from white flour. Instant white rice, refined cereals (Cream of Wheat, Cream of Rice, Cornflakes, Rice Krispies), pancakes, waffles, muffins, cornbread, jeremy crackers. Whole grain breads and cereals, bran, Shredded Wheat, wild rice, granola.   Fruits and vegetables Canned, frozen or peeled fresh fruits such as bananas or applesauce. Tomato paste, tomato sauce, tomato puree, cooked vegetables (acorn squash, asparagus, beets, carrots, celery, green beans, mushrooms, baked potato without skin, peeled zucchini). Unpeeled fruits, melons, grapefruit juice, all vegetables not listed on the left.   Milk products Skim or 1% milk, low-fat yogurt, low-fat cheese. Whole or 2% milk, high-fat ice cream, cream.   Drinks Sports drinks, watered-down fruit juices. (No caffeine.) Prune juice, caffeine.   Desserts Cookies, cake, gelatin, sherbet, fruit pies (avoid pies made with unpeeled fruit). Nuts, coconut, chocolate, licorice.   Other Broth, butter, margarine, mayonnaise, salad dressing, vegetable oil. Hot sauce, pepper, chili powder, taco seasoning.    Comments:  __________________________________________  __________________________________________  __________________________________________  __________________________________________  __________________________________________  __________________________________________  __________________________________________  __________________________________________  __________________________________________  __________________________________________  For informational purposes only. Not to replace the advice of your health care provider. Copyright   2006 [facility]. All rights reserved. Clinically reviewed by [facility]. SMARTworks 143611 - REV 04/19.       Patient Education     Diarrhea with Uncertain Cause (Adult)    Diarrhea is when stools are loose and watery. This can be caused by:    Viral infections    Bacterial infections    Food poisoning    Parasites    Irritable bowel syndrome (IBS)    Inflammatory bowel diseases such as ulcerative colitis, Crohn's disease, and celiac disease    Food intolerance, such as to lactose, the sugar found in milk and milk products    Reaction to medicines like antibiotics, laxatives, cancer drugs, and antacids  Along with diarrhea, you may also have:    Abdominal pain and cramping    Nausea and vomiting    Loss of bowel control    Fever and chills    Bloody stools  In some cases, antibiotics may help to treat diarrhea. You may have a stool sample test. This is done to see what is causing your diarrhea, and if antibiotics will help treat it. The results of a stool sample test may take up to 2 days. The healthcare provider may not give you antibiotics until he or she has the stool test results.  Diarrhea can cause dehydration. This is the loss of too much water and other fluids from the body. When this occurs, body fluid must be replaced. This can be done with oral rehydration solutions. Oral rehydration solutions are available at drugstores and grocery stores  without a prescription. Sports drinks are not the best choice if you are very dehydrated. They have too much sugar and not enough electrolytes.  Home care  Follow all instructions given by your healthcare provider. Rest at home for the next 24 hours, or until you feel better. Avoid caffeine, tobacco, and alcohol. These can make diarrhea, cramping, and pain worse.  If taking medicines:    Over-the-counter nausea and diarrhea medicines are generally OK unless you experience fever or blood stool. Check with your doctor first in those circumstances.    You may use acetaminophen or NSAID medicines like ibuprofen or naproxen to reduce pain and fever. Don t use these if you have chronic liver or kidney disease, or ever had a stomach ulcer or gastrointestinal bleeding. Don't use NSAID medicines if you are already taking one for another condition (like arthritis) or are on daily aspirin therapy (such as for heart disease or after a stroke). Talk with your healthcare provider first.    If antibiotics were prescribed, be sure you take them until they are finished. Don t stop taking them even when you feel better. Antibiotics must be taken as a full course.  To prevent the spread of illness:    Remember that washing with soap and water and using alcohol-based  is the best way to prevent the spread of infection. Dry your hands with a single use towel (like a paper towel).    Clean the toilet after each use.    Wash your hands before eating.    Wash your hands before and after preparing food. Keep in mind that people with diarrhea or vomiting should not prepare food for others.    Wash your hands after using cutting boards, countertops, and knives that have been in contact with raw foods.    Wash and then peel fruits and vegetables.    Keep uncooked meats away from cooked and ready-to-eat foods.    Use a food thermometer when cooking. Cook poultry to at least 165 F (74 C). Cook ground meat (beef, veal, pork, lamb) to at  least 160 F (71 C). Cook fresh beef, veal, lamb, and pork to at least 145 F (63 C).    Don t eat raw or undercooked eggs (poached or viktoria side up), poultry, meat, or unpasteurized milk and juices.  Food and drinks  The main goal while treating vomiting or diarrhea is to prevent dehydration. This is done by taking small amounts of liquids often.    Keep in mind that liquids are more important than food right now.    Drink only small amounts of liquids at a time.    Don t force yourself to eat, especially if you are having cramping, vomiting, or diarrhea. Don t eat large amounts at a time, even if you are hungry.    If you eat, avoid fatty, greasy, spicy, or fried foods.    Don t eat dairy foods or drink milk if you have diarrhea. These can make diarrhea worse.  During the first 24 hours you can try:    Oral rehydration solutions.  Sports drinks may be used if you are not too dehydrated and are otherwise healthy.    Soft drinks without caffeine    Ginger ale    Water (plain or flavored)    Decaf tea or coffee    Clear broth, consommé, or bouillon    Gelatin, popsicles, or frozen fruit juice bars  The second 24 hours, if you are feeling better, you can add:    Hot cereal, plain toast, bread, rolls, or crackers    Plain noodles, rice, mashed potatoes, chicken noodle soup, or rice soup    Unsweetened canned fruit (no pineapple)    Bananas  As you recover:    Limit fat intake to less than 15 grams per day. Don t eat margarine, butter, oils, mayonnaise, sauces, gravies, fried foods, peanut butter, meat, poultry, or fish.    Limit fiber. Don t eat raw or cooked vegetables, fresh fruits except bananas, or bran cereals.    Limit caffeine and chocolate.    Limit dairy.    Don t use spices or seasonings except salt.    Go back to your normal diet over time, as you feel better and your symptoms improve.    If the symptoms come back, go back to a simple diet or clear liquids.  Follow-up care  Follow up with your healthcare  provider, or as advised. If a stool sample was taken or cultures were done, call the healthcare provider for the results as instructed.  Call 911  Call 911 if you have any of these symptoms:    Trouble breathing    Confusion    Extreme drowsiness or trouble walking    Loss of consciousness    Rapid heart rate    Chest pain    Stiff neck    Seizure  When to seek medical advice  Call your healthcare provider right away if any of these occur:    Abdominal pain that gets worse    Constant lower right abdominal pain    Continued vomiting and inability to keep liquids down    Diarrhea more than 5 times a day    Blood in vomit or stool    Dark urine or no urine for 8 hours, dry mouth and tongue, tiredness, weakness, or dizziness    Drowsiness    New rash    You don t get better in 2 to 3 days    Fever of 100.4 F (38 C) or higher, or as directed by your healthcare provider  Jignesh last reviewed this educational content on 6/1/2018 2000-2021 [employer]. All rights reserved. This information is not intended as a substitute for professional medical care. Always follow your healthcare professional's instructions.           Patient Education   Resources to Help You Quit Smoking  If you have quit smoking or are thinking about quitting, congratulations! It can be hard to quit smoking, but the benefits are well worth it. To help you quit and stay smoke-free, there are many resources that can help.  Your health plan  If you have health insurance, call them for more details about their phone coaching programs.    [facility]:  1-888-662-BLUE (8-342-018-2406)    [facility]:  1-888-662-QUIT (5-654-461-2511)    [facility]:  1-888-662-BLUE (6-631-918-6060)    [facility]:  [phone]    [facility]:  1-163.133.8135    [place] [employer] members:  1-100.543.7464    [facility] Health Plan:   [phone]    [facility]  Plan:  [phone]    [facility]:  [phone]  Other resources  American Cancer Society: 1-657.204.9976  The American Cancer Society can help you find local resources to quit smoking.  [employer]: 0-701-441-PLAN (1-590.549.1354)  Offers a telephone helpline, gum, patches and lozenges. These services are free for the uninsured and those without coverage. The online program is free to everyone at www.quitTurnStar.com.  American Lung Association: 1-800-LUNG-USA (1-832.334.6025)  Provides a lung helpline as well as an online program, self-help book and group clinic support for quitting smoking. www.lung.org/stop-smoking  [employer] [facility] [place]:  9-180-452-QUIT (1-621.611.2628)  Offers a telephone hotline, online text chat and a website with tools, information and support for smokers who want to quit. www.smokefree.gov  [facility] (MTM):  [phone] (toll free)  [email or web address]  This is a clinic program to help you quit smoking. It offers one-on-one sessions with a pharmacist. Call or email to find out if your insurance covers MTM and to schedule an appointment at all locations.  For informational purposes only. Not to replace the advice of your health care provider. Copyright   2013 [facility]. All rights reserved. Clinically reviewed by Dana Herrmann MD, [facility] Lung Cancer Screening Program. Node1 129856 - Rev 06/19.       Patient Education     Kicking the Smoking Habit   If you smoke, it doubles your risk of heart disease. Quitting is one of the best changes you can make for your heart and your overall health. Your risk of heart attack goes down within 1 day of putting out that last cigarette. As you go longer without smoking, your risk drops even more. Quitting isn t easy. But millions of people have done it. You can, too. It s never too late to quit.  Getting started  Boost your chances of success by deciding on your quit plan.  Your healthcare provider and cardiac rehab team can help you create this plan. Even if you ve already quit, it s easy to slip back into smoking. Your plan can help you prevent and recover from relapse. Start by setting a date to quit within a month. And then do it.    Keys to your quit plan    Talk with your healthcare provider about prescription medicines and nicotine replacement products that help stop the urge to smoke.     Join a support group or quit-smoking program. Talking with others about the challenges of quitting can help you get through them. For more support options, try www.smokefree.gov.    Ask other smokers in your home to quit with you.    Look for the cues in your life that you connect with smoking. Stay away from them.    Track your triggers  What gives you that  C-tbqm-w-cigarette  feeling? List all the situations that make you want a cigarette. Then think of other ways to deal with these situations. Here are some examples:  Situation How I'll handle it   Finishing a meal Get up from the table and take a walk   Having an argument Find a quiet place and breathe deeply   Feeling lonely or bored Call a friend to talk   Tips for quitting successfully    List the benefits of quitting such as reducing heart risks and saving money. Keep this list and review it whenever you feel like smoking.    Get support. Let your friends know you may call them to talk when you have an urge to smoke.    If you ve tried to quit before without success, this time stay away from the triggers that may cause the relapse.    Make the most of slip-ups. Try to learn from them, and then get back on track.    Be accountable to your friends and your calendar so that you stay on track.    For family and friends    Be supportive and patient. Quitting smoking can be hard and stressful.    If you smoke, now s a great time to quit. Even if you don t quit, never smoke around your loved one. Secondhand smoke is dangerous to his or her  "heart.    The best goals are reached as a team. Remember that when your loved one says he or she wants to stop smoking.    [employer] last reviewed this educational content on 7/1/2019 2000-2021 [employer]. All rights reserved. This information is not intended as a substitute for professional medical care. Always follow your healthcare professional's instructions.           Patient Education     Help for Smokers  What Are Your Reasons for Quitting?  Should I think about quitting smoking?  When it comes to lasting life change, readiness is everything. This may be the perfect time to quit using tobacco. If you have concerns about your health, this is your chance to reflect on your habits and make healthy changes.  Perhaps you have tried quitting in the past. It may help to think of quitting as a process that you take one day at a time. Every attempt brings you closer to success. And each time you try, you learn more about what works for you.  What are my reasons for quitting?  The first step in quitting is to decide why quitting is important to you. Here is one good reason:  \"Scientists have identified more than 7,000 chemicals and chemical compounds in tobacco smoke. At least 70 of them are known specifically to cause cancer.\"  --2014 Surgeon General Report  Consider health risks:    Smoking is the leading cause of heart disease, lung disease and stroke.    For the first time, women are as likely to die as men from many diseases caused by smoking.    Smoking can harm a person's health before birth and throughout his or her life.    Smoking causes cancer of the lungs, kidney, stomach, pancreas, cervix and bone marrow.    Smoking leads to impotence (loss of sexual function).    Smoking causes cataracts (clouding of the lens in the eye).    Smoking can cause you to lose teeth.    Smoking can lead to osteoporosis (brittle bone disease). This means a greater risk of broken bones. And if you smoke, your " "bones will heal more slowly.    Smoking leads to more infections. If you are healing from surgery, your incision has a greater chance of getting infected, which delays healing.    Smoking leads to more trips to the hospital--and longer stays.    Smokers are 30 to 40 percent more likely to develop type 2 diabetes than non-smokers.    About 3 out of 4 teen smokers become adult smokers, even if they plan to quit in a few years.    Secondhand smoke exposure is now known to cause strokes in nonsmokers.    More than 33,000 nonsmokers die every year in the [place] from coronary artery disease caused by exposure to secondhand smoke.  Consider personal reasons:    Smoking costs too much money.    My clothes stink.    I miss out on activities with my family because I am away smoking.    My breathing test showed I have the beginnings of emphysema.    A loved one  of cancer.    Smoking seems to control my life.  What other reasons do you have for quitting?  __________________________________________  __________________________________________  Does the way I talk to myself affect my ability to quit smoking?  If you tell yourself you can't quit, it will be very hard to quit. If you tell yourself you are a non-smoker, you will live up to that name.  Quitting happens one day at a time. When you feel a strong urge to smoke, think about your larger goal: to have a free and healthy life.  If I change my behaviors, am I more likely to succeed?  To go to the Super Bowl, a football team must have an action plan. Team members must prepare for the obrien to win. They have to have more than one play. And if they don't make it to the big game, they don't give up--they simply make a new plan for next time.  Like getting to the Super Bowl, quitting smoking calls for more than just willpower. Nicotine is a powerful, addictive drug. Withdrawal symptoms are much like those from cocaine or heroin. To fight them, you need many \"plays\" " "throughout the day.  Most people do not succeed the first time they try to quit smoking. It may take several tries, and you may need several action plans.  Start your day with a plan. Change how and where you do things. If you always smoke in a certain chair in the living room, don't go there. If you always have a cigarette with a drink, avoid drinking for a while. Other steps you might take:    Instead of smoking in the morning, brush your teeth when you first get up, then eat a healthy breakfast.    Instead of smoking in the car, stash some low-fat treats in the glove box. Or buy special music for the ride.    Instead of smoking after meals, clear the table and go for a walk.    Instead of smoking when you're under stress, do some deep-breathing exercises.  When you wake up the next day, start your action plan again. See yourself as a winner. Being free of nicotine puts you in control of your body and health. Don't take your eyes off that goal, even when the going gets rough.  What can I do about nicotine withdrawal?  Nicotine addiction is not about willpower or strength of character. It's about brain chemistry. Nicotine provides a \"kick,\" causing the brain to release chemicals into the body. These chemicals give you a feeling of pleasure. Depending on the dose, they can also make you feel calm. People smoke to keep high levels of these chemicals in the body. The pleasure they feel makes them want to keep smoking.  In the first 48 hours after quitting, nicotine withdrawal can be intense. You may feel irritable and have strong cigarette cravings. You might have a hard time going to sleep or concentrating while awake. You may want to eat more than usual.  The good news is, these symptoms peak within a few days. They should subside within a few weeks.  Do smoking aids help?  Yes. Using a smoking aid--such as a nicotine patch or a medicine like Chantix--can increase your chances for success. Ask your doctor which aid " is best for you:    Nicotine patches    Nicotine gum or lozenges    Nicotine nasal spray or a nicotine inhaler (a plastic filter that you puff almost like a cigarette), prescribed by a doctor    Varenicline (Chantix) or bupropion (Zyban, Wellbutrin), prescribed by a doctor  Nicotine replacement helps with the physical addiction. If you combine this with a quit-smoking program, you can double your chances for success.  What is my action plan?    Before you quit, make two lists: reasons you want to quit and reasons you don't. When your first list is twice as long as the second, you will be more likely to succeed.    Pick a quit date. Perhaps it was the day you came to the hospital or clinic.    Tell friends and family.    Stock up on carrots, sugar-free mints, cinnamon sticks and other items to keep your mouth busy.    Keep your hands busy with drawing, knitting, playing an instrument or other hobbies.    Find support. Will you use nicotine replacement? Attend a class? Join [employer]? Talk with friends who have quit?    Get rid of all cigarettes, lighters, ashtrays and other smoking items.    Keep active. Exercise will release pleasure chemicals in your brain, just like smoking did. Try walking, biking, gardening and other activities.    Drink lots of water.    Reduce or avoid alcohol.    Breathe deeply. When you smoked, you breathed the smoke deep into your lungs. Now picture your lungs filling with fresh, clean air.    Reward yourself with the money you saved by quitting. Go to a movie, take a vacation, buy a car.  Today, there are more former smokers than current smokers. Each year, over half of all daily smokers try to quit.  How quickly will my health improve?  Your body has an amazing way of healing itself over time, if you let it. Here are just a few of the long-term rewards of quitting. But don't forget--all these benefits will end if you light another cigarette.  The moment you stop smoking  The air around you  is no longer harmful to children or other adults.  After 20 minutes  Your blood pressure and heart rate drop to normal.  Your hands and feet warm to a normal temperature.  After 8 hours  The carbon monoxide level in your blood drops to normal.  The oxygen level in your blood rises to normal.  After 24 hours  Your chance of heart attack is lower.  After 48 hours  Your sense of smell and taste improve.  After 2 weeks to 3 months  Your blood flow improves.  It's easier for you to walk.  Your lungs work up to 30 percent better than before.  You catch colds and the flu less often.  After 1 to 9 months  You have less coughing and shortness of breath.  You have fewer sinus problems.  You have more energy.  You feel better about yourself because you've done what you set out to do.  After 1 year  You cut your risk of heart disease in half.  You lower your risk of cancer and lung disease.  You have fewer sick days and health problems.  You reduce the cost of your auto, home and life insurance.  After 10 years  Normal cells replace your pre-cancer cells.  You greatly lower your risk of mouth, larynx, lung and bladder cancer.  Your risk of artery disease is the same as if you had never smoked.  You--and anyone who was exposed to your second-hand smoke--will have a longer life expectancy.  For more information  [employer] (4-398-430-PLAN), the [place] Tobacco Quit Line, provides free professional counseling over the phone.  www.[employer].com  For informational purposes only. Not to replace the advice of your health care provider.  Copyright   2004 [facility]. All rights reserved. Clinically reviewed by Jes Peterson. [employer] 426081 - REV 08/18.           Patient Education     How to Quit Smoking  Smoking is a hard habit to break. About 50% of all people who have ever smoked have been able to quit. Most people who still smoke want to quit. Here are some of the best ways to stop smoking.     Keep in mind the health  benefits of quitting  The health benefits of quitting start right away. They keep improving the longer you go without smoking. Knowing this can help inspire you to stay on track. These benefits occur at any age. If you are 17 or 70, quitting is a good choice. Some of the health benefits after your last cigarette include:     After 20 minutes: Your blood pressure and pulse return to normal.    After 8 hours: Your oxygen levels return to normal.    After 2 days: Your ability to smell and taste start to improve as damaged nerves regrow.    After 2 to 3 weeks: Your circulation and lung function improve.    After 1 to 9 months: Your coughing, congestion, and shortness of breath decrease. Your tiredness decreases.    After 1 year: Your risk of heart attack decreases by 50%.    After 5 years: Your risk of lung cancer decreases by 50%. Your risk of stroke becomes the same as a nonsmoker s.  Go cold turkey  Most former smokers quit cold turkey. This means stopping all at once. Trying to cut back slowly often doesn't work as well. This may be because it continues the habit of smoking. Also you may inhale more smoke while smoking fewer cigarettes. This leads to the same amount of nicotine in your body.   Get support  Support programs can be a big help, especially for heavy smokers. These groups offer lectures, ways to change behavior, and peer support. Here are some ways to find a support program:     Free national quitline 800-QUIT-NOW (482-016-0994)    [facility] quit-smoking programs    American Lung Association 028-951-2120    American Cancer Society 339-714-0697  Support at home is important too. Family and friends can offer praise and reassurance. If the smoker in your life finds it hard to quit, encourage them to keep trying.   Try over-the-counter medicine  Nicotine replacement therapy may make it easier to quit. Some aids are available without a prescription. These include a nicotine patch, gum, and lozenges. But it's  best to use these under the care of your healthcare provider. The skin patch gives a steady supply of nicotine. Nicotine gum and lozenges give short-time doses of low levels of nicotine. Both methods reduce the craving for cigarettes. If you have nausea, vomiting, dizziness, weakness, or a fast heartbeat, stop using these products. See your provider.   Ask about prescription medicine  After reviewing your smoking patterns and past attempts to quit, your healthcare provider may offer a prescription medicine such as bupropion, varenicline, a nicotine inhaler, or nasal spray. Each has advantages and side effects. Your provider can review these with you.   Keep trying  Most smokers make many attempts at quitting before they succeed. It s important not to give up.   To learn more  For more on how to quit smoking, try these resources:     www.cdc.gov/tobacco/quit_smoking/ 800-QUIT-NOW (001-343-5177)    www.smokefree.gov 877-44U-QUIT (810-851-0826)    www.lung.org/stop-smoking/ 800-LUNGUSA (202-571-2786)  Jignesh last reviewed this educational content on 12/1/2019 2000-2021 [employer]. All rights reserved. This information is not intended as a substitute for professional medical care. Always follow your healthcare professional's instructions.

## 2021-08-27 LAB
DEPRECATED CALCIDIOL+CALCIFEROL SERPL-MC: 27 UG/L (ref 20–75)
FERRITIN SERPL-MCNC: 46 NG/ML (ref 12–150)
HCV AB SERPL QL IA: NONREACTIVE
IRON SATN MFR SERPL: 19 % (ref 15–46)
IRON SERPL-MCNC: 60 UG/DL (ref 35–180)
TIBC SERPL-MCNC: 313 UG/DL (ref 240–430)
TSH SERPL DL<=0.005 MIU/L-ACNC: 1.22 MU/L (ref 0.4–4)

## 2021-08-27 ASSESSMENT — ASTHMA QUESTIONNAIRES: ACT_TOTALSCORE: 20

## 2021-08-27 NOTE — TELEPHONE ENCOUNTER
Central Prior Authorization Team   Phone: [phone]      PA Initiation    Medication: Nicotrol 10mg inhaler -Initiated  Insurance Company: [employer] - Phone [phone] Fax [phone]  Pharmacy Filling the Rx: [place] [facility] - [id number] [facility] [place]  Filling Pharmacy Phone: [phone]  Filling Pharmacy Fax:    Start Date: 8/27/2021

## 2021-08-27 NOTE — RESULT ENCOUNTER NOTE
Santiago Shelby,    Thank you for your recent office visit.    Here are your recent results.  Blood labs look pretty normal    Feel free to contact me via [employer] or call the clinic at [phone].    Sincerely,    FELA Scott, FNP-BC

## 2021-08-30 ENCOUNTER — LAB (OUTPATIENT)
Dept: LAB | Facility: CLINIC | Age: 36
End: 2021-08-30
Payer: COMMERCIAL

## 2021-08-30 DIAGNOSIS — R19.7 DIARRHEA, UNSPECIFIED TYPE: ICD-10-CM

## 2021-08-30 LAB — C DIFF TOX B STL QL: POSITIVE

## 2021-08-30 PROCEDURE — 87177 OVA AND PARASITES SMEARS: CPT

## 2021-08-30 PROCEDURE — 87493 C DIFF AMPLIFIED PROBE: CPT | Mod: 59

## 2021-08-30 PROCEDURE — 87506 IADNA-DNA/RNA PROBE TQ 6-11: CPT

## 2021-08-30 PROCEDURE — 87209 SMEAR COMPLEX STAIN: CPT

## 2021-08-30 NOTE — TELEPHONE ENCOUNTER
Teresa Cowan Prior Auth 1 hour ago (7:37 AM)   DS  PA has been denied, please see rational.  If an appeal is desired please let the PA team know when a letter of medical necessity has been written and placed in the patient's chart.   If done with encounter, please notify the patient and close the encounter.     Thank you,   Central PA Team      Nicotrol inhaler that is being denies was prescribed by Dana Borja not Dr. Agbeh.  Routing to Dana Borja to determine if a appeals letter needs to be done on this denial.    Charmaine Whitfield, RN

## 2021-08-30 NOTE — TELEPHONE ENCOUNTER
PRIOR AUTHORIZATION DENIED    Medication: Nicotrol 10mg inhaler -DENIED    Denial Date: 8/27/2021    Denial Rational: Patient must have a history of trial & failure to the formulary alternative(s) or have a contraindication or intolerance to the formulary alternatives: Bupropion SR                  Appeal Information:     If provider would like to appeal please provide a letter of medical necessity stating why formulary alternatives would not be clinically appropriate for patient and route back to the PA team.

## 2021-08-31 LAB
C COLI+JEJUNI+LARI FUSA STL QL NAA+PROBE: NOT DETECTED
EC STX1 GENE STL QL NAA+PROBE: NOT DETECTED
EC STX2 GENE STL QL NAA+PROBE: NOT DETECTED
NOROV GI+II ORF1-ORF2 JNC STL QL NAA+PR: NOT DETECTED
O+P STL MICRO: NEGATIVE
RVA NSP5 STL QL NAA+PROBE: NOT DETECTED
SALMONELLA SP RPOD STL QL NAA+PROBE: NOT DETECTED
SHIGELLA SP+EIEC IPAH STL QL NAA+PROBE: NOT DETECTED
V CHOL+PARA RFBL+TRKH+TNAA STL QL NAA+PR: NOT DETECTED
Y ENTERO RECN STL QL NAA+PROBE: NOT DETECTED

## 2021-09-01 ENCOUNTER — OFFICE VISIT (OUTPATIENT)
Dept: NURSING | Facility: CLINIC | Age: 36
End: 2021-09-01
Attending: OBSTETRICS & GYNECOLOGY
Payer: COMMERCIAL

## 2021-09-01 ENCOUNTER — ANCILLARY PROCEDURE (OUTPATIENT)
Dept: GENERAL RADIOLOGY | Facility: CLINIC | Age: 36
End: 2021-09-01
Payer: COMMERCIAL

## 2021-09-01 VITALS — HEART RATE: 101 BPM | BODY MASS INDEX: 40.34 KG/M2 | OXYGEN SATURATION: 96 % | WEIGHT: 205.5 LBS | HEIGHT: 60 IN

## 2021-09-01 DIAGNOSIS — J45.20 MILD INTERMITTENT ASTHMA WITHOUT COMPLICATION: ICD-10-CM

## 2021-09-01 DIAGNOSIS — F17.200 TOBACCO USE DISORDER: Primary | ICD-10-CM

## 2021-09-01 PROCEDURE — 94726 PLETHYSMOGRAPHY LUNG VOLUMES: CPT | Performed by: INTERNAL MEDICINE

## 2021-09-01 PROCEDURE — 94729 DIFFUSING CAPACITY: CPT | Performed by: INTERNAL MEDICINE

## 2021-09-01 PROCEDURE — 71046 X-RAY EXAM CHEST 2 VIEWS: CPT | Mod: GC | Performed by: RADIOLOGY

## 2021-09-01 PROCEDURE — 94375 RESPIRATORY FLOW VOLUME LOOP: CPT | Performed by: INTERNAL MEDICINE

## 2021-09-01 ASSESSMENT — MIFFLIN-ST. JEOR: SCORE: 1553.64

## 2021-09-01 NOTE — RESULT ENCOUNTER NOTE
Santiago Shelby,    Thank you for your recent office visit.    Here are your recent results.  I see that you have cdiff and you are currently being treated as I was out of office. Please take the full course of medication, be careful not to miss doses.  Wash hands well, often. Follow up if your symptoms worsen!    Feel free to contact me via [employer] or call the clinic at [phone].    Sincerely,    Dana Borja, FELA, FNP-BC

## 2021-09-02 LAB
DLCOCOR-%PRED-PRE: 71 %
DLCOCOR-PRE: 13.77 ML/MIN/MMHG
DLCOUNC-%PRED-PRE: 75 %
DLCOUNC-PRE: 14.51 ML/MIN/MMHG
DLCOUNC-PRED: 19.35 ML/MIN/MMHG
ERV-%PRED-PRE: 107 %
ERV-PRE: 0.54 L
ERV-PRED: 0.5 L
EXPTIME-PRE: 6.23 SEC
FEF2575-%PRED-PRE: 90 %
FEF2575-PRE: 2.84 L/SEC
FEF2575-PRED: 3.13 L/SEC
FEFMAX-%PRED-PRE: 97 %
FEFMAX-PRE: 6.22 L/SEC
FEFMAX-PRED: 6.42 L/SEC
FEV1-%PRED-PRE: 76 %
FEV1-PRE: 2.11 L
FEV1FEV6-PRE: 86 %
FEV1FEV6-PRED: 85 %
FEV1FVC-PRE: 86 %
FEV1FVC-PRED: 85 %
FEV1SVC-PRE: 86 %
FEV1SVC-PRED: 83 %
FIFMAX-PRE: 3.43 L/SEC
FRCPLETH-%PRED-PRE: 66 %
FRCPLETH-PRE: 1.63 L
FRCPLETH-PRED: 2.45 L
FVC-%PRED-PRE: 75 %
FVC-PRE: 2.46 L
FVC-PRED: 3.26 L
IC-%PRED-PRE: 68 %
IC-PRE: 1.92 L
IC-PRED: 2.81 L
RVPLETH-%PRED-PRE: 83 %
RVPLETH-PRE: 1.09 L
RVPLETH-PRED: 1.3 L
TLCPLETH-%PRED-PRE: 83 %
TLCPLETH-PRE: 3.55 L
TLCPLETH-PRED: 4.27 L
VA-%PRED-PRE: 81 %
VA-PRE: 3.36 L
VC-%PRED-PRE: 74 %
VC-PRE: 2.46 L
VC-PRED: 3.31 L

## 2021-09-03 DIAGNOSIS — Z11.59 ENCOUNTER FOR SCREENING FOR OTHER VIRAL DISEASES: ICD-10-CM

## 2021-09-13 ENCOUNTER — OFFICE VISIT (OUTPATIENT)
Dept: PULMONOLOGY | Facility: CLINIC | Age: 36
End: 2021-09-13
Payer: COMMERCIAL

## 2021-09-13 VITALS
RESPIRATION RATE: 17 BRPM | WEIGHT: 205 LBS | OXYGEN SATURATION: 98 % | HEART RATE: 100 BPM | TEMPERATURE: 98.1 F | BODY MASS INDEX: 40.25 KG/M2 | DIASTOLIC BLOOD PRESSURE: 83 MMHG | HEIGHT: 60 IN | SYSTOLIC BLOOD PRESSURE: 117 MMHG

## 2021-09-13 DIAGNOSIS — J45.20 MILD INTERMITTENT ASTHMA WITHOUT COMPLICATION: Primary | ICD-10-CM

## 2021-09-13 PROCEDURE — 99204 OFFICE O/P NEW MOD 45 MIN: CPT | Performed by: INTERNAL MEDICINE

## 2021-09-13 ASSESSMENT — ASTHMA QUESTIONNAIRES
QUESTION_4 LAST FOUR WEEKS HOW OFTEN HAVE YOU USED YOUR RESCUE INHALER OR NEBULIZER MEDICATION (SUCH AS ALBUTEROL): NOT AT ALL
QUESTION_2 LAST FOUR WEEKS HOW OFTEN HAVE YOU HAD SHORTNESS OF BREATH: ONCE OR TWICE A WEEK
QUESTION_5 LAST FOUR WEEKS HOW WOULD YOU RATE YOUR ASTHMA CONTROL: WELL CONTROLLED
ACT_TOTALSCORE: 22
QUESTION_3 LAST FOUR WEEKS HOW OFTEN DID YOUR ASTHMA SYMPTOMS (WHEEZING, COUGHING, SHORTNESS OF BREATH, CHEST TIGHTNESS OR PAIN) WAKE YOU UP AT NIGHT OR EARLIER THAN USUAL IN THE MORNING: ONCE OR TWICE
QUESTION_1 LAST FOUR WEEKS HOW MUCH OF THE TIME DID YOUR ASTHMA KEEP YOU FROM GETTING AS MUCH DONE AT WORK, SCHOOL OR AT HOME: NONE OF THE TIME

## 2021-09-13 ASSESSMENT — MIFFLIN-ST. JEOR: SCORE: 1551.37

## 2021-09-13 NOTE — PROGRESS NOTES
Pulmonary Clinic New Patient Consult  Reason for Consult: Asthma  History of Present Illness  I have the pleasure of seeing Heron Gee, who is a 36-year-old female who presents to clinic for evaluation of asthma and shortness of breath.  Heron was diagnosed with asthma in childhood and has had exercise-induced asthma during her teen years.  Her asthma was very controlled and she rarely required the use of her prescribed albuterol.  More lately, she has had an interval slight increase in shortness of breath that is worse with exertion.  She was recently admitted for an elective hysterectomy following a diagnosis of cervical cancer and she was noted to have had slight respiratory distress during the perioperative period.  There was also complication of acute colitis secondary to C diff for which she has completed treatment.  More recently, she has noticed increasing shortness of breath, worse with exertion and absent at rest.  She denies any frequent wheezing, no chest tightness, no cough.  She does not necessarily require the use of her albuterol as she is not very active and does not exercise frequently.  She denies any chest pains, no leg swellings, no palpitations, no fevers, no hemoptysis, no orthopnea and no weight loss.  She denies any reflux symptoms and no loud snoring.  Heron continues to smoke, she smokes about 1 pack per day, for a total of 14 pack years of smoking.  She started smoking at age 22.  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 Has no pets. No history of flooding or molds in her house.  No family history of lung cancer or chronic lung disease.  Review of Systems:  10 of 14 systems reviewed and are negative unless otherwise stated in HPI.    Past Medical History:   Diagnosis Date     Abnormal Pap smear of cervix     4/17/15, 2019. 1/29/21     Cervical high risk HPV (human papillomavirus) test positive     04/15/2019, 10/28/19, 1/29/21     Cervix cancer (H)      Diabetes  (H)      Exercise-induced asthma        Past Surgical History:   Procedure Laterality Date     APPENDECTOMY OPEN CHILD        SECTION  09/04/2019    x 2     COLPOSCOPY, CONIZATION, COMBINED N/A 4/15/2021    Procedure: Cold Knife Cone of the cervix;  Surgeon: Fritz Barron MD;  Location: [facility] OR     ENT SURGERY       LAPAROSCOPIC HYSTERECTOMY TOTAL, SALPINGECTOMY BILATERAL Bilateral 6/10/2021    Procedure: Total laparoscopic hysterectomy, bilateral salpingectomy;  Surgeon: Fritz Barron MD;  [place] [facility] [place]     LEEP TX, CERVICAL       NOSE SURGERY      Deviated septum       Family History   Problem Relation Age of Onset     Diabetes Brother      Cancer Paternal Great-Grandmother      Breast Cancer Paternal Great-Grandmother      Hypertension No family hx of      Ovarian Cancer No family hx of      Cerebrovascular Disease No family hx of      Thyroid Disease No family hx of        Social History     Socioeconomic History     Marital status: Single     Spouse name: None     Number of children: None     Years of education: None     Highest education level: None   Occupational History     None   Tobacco Use     Smoking status: Current Every Day Smoker     Packs/day: 0.50     Years: 13.00     Pack years: 6.50     Types: Cigarettes     Smokeless tobacco: Never Used   Vaping Use     Vaping Use: Never used   Substance and Sexual Activity     Alcohol use: Yes     Drug use: No     Sexual activity: Yes     Partners: Male     Birth control/protection: Female Surgical   Other Topics Concern     Parent/sibling w/ CABG, MI or angioplasty before 65F 55M? No   Social History Narrative     None     Social Determinants of Health     Financial Resource Strain:      Difficulty of Paying Living Expenses:    Food Insecurity:      Worried About Running Out of Food in the Last Year:      Ran Out of Food in the Last Year:    Transportation Needs:      Lack of Transportation (Medical):      Lack of Transportation (Non-Medical):     Physical Activity:      Days of Exercise per Week:      Minutes of Exercise per Session:    Stress:      Feeling of Stress :    Social Connections:      Frequency of Communication with Friends and Family:      Frequency of Social Gatherings with Friends and Family:      Attends [employer] Services:      Active Member of Clubs or Organizations:      Attends Club or Organization Meetings:      Marital Status:    Intimate Partner Violence:      Fear of Current or Ex-Partner:      Emotionally Abused:      Physically Abused:      Sexually Abused:          No Known Allergies      Current Outpatient Medications:      albuterol (PROAIR HFA/PROVENTIL HFA/VENTOLIN HFA) 108 (90 Base) MCG/ACT inhaler, Inhale 2 puffs into the lungs every 6 hours, Disp: 1 Inhaler, Rfl: 1     fexofenadine (ALLEGRA) 180 MG tablet, Take 180 mg by mouth daily, Disp: , Rfl:      Probiotic Product ([employer] PROBIOTIC) CHEW, Take by mouth daily , Disp: , Rfl:      tiZANidine (ZANAFLEX) 2 MG tablet, Take 1 tablet (2 mg) by mouth 3 times daily as needed for muscle spasms May double dosage if needed, Disp: 30 tablet, Rfl: 1     butalbital-acetaminophen-caffeine (ESGIC) -40 MG tablet, Take 1 tablet by mouth 3 times daily as needed for headaches (Patient not taking: Reported on 9/13/2021), Disp: 15 tablet, Rfl: 1     loperamide (IMODIUM A-D) 2 MG tablet, Take 1 tablet (2 mg) by mouth 4 times daily as needed for diarrhea (Patient not taking: Reported on 9/13/2021), Disp: 56 tablet, Rfl: 0     nicotine (NICODERM CQ) 14 MG/24HR 24 hr patch, Place 1 patch onto the skin every 24 hours Use second (Patient not taking: Reported on 9/13/2021), Disp: 14 patch, Rfl: 0     nicotine (NICODERM CQ) 21 MG/24HR 24 hr patch, Place 1 patch onto the skin every 24 hours Use first (Patient not taking: Reported on 9/13/2021), Disp: 14 patch, Rfl: 0     nicotine (NICODERM CQ) 7 MG/24HR 24 hr patch, Place 1 patch onto the skin every 24 hours (Patient not taking: Reported on  9/13/2021), Disp: 90 patch, Rfl: 3     nicotine (NICOTROL) 10 MG inhaler, Use 1 cartridge as needed for urge to smoke by puffing over course of 20min.  Use 6-16 cart/day; reduce number of cart/day over 6-12 weeks., Disp: 168 each, Rfl: 3      Physical Exam:  /83   Pulse 100   Temp 98.1  F (36.7  C)   Resp 17   Ht 1.524 m (5')   Wt 93 kg (205 lb)   LMP 06/01/2021   SpO2 98%   BMI 40.04 kg/m    GENERAL: Well developed, well nourished, alert, and in no apparent distress.  HEENT: Normocephalic, atraumatic. PERRL, EOMI. Oral mucosa is moist. No perioral cyanosis.  NECK: supple, no masses, no thyromegaly.  RESP:  Normal respiratory effort.  CTAB.  No rales, wheezes, rhonchi.  No cyanosis or clubbing.  CV: Normal S1, S2, regular rhythm, normal rate. No murmur.  No LE edema.   ABDOMEN:  Soft, non-tender, non-distended.   SKIN: warm and dry. No rash.  NEURO: AAOx3.  Normal gait.  Fluent speech.  PSYCH: mentation appears normal.       Results:  PFTs:  Most Recent [facility] Pulmonary Function Testing    FVC-Pred   Date Value Ref Range Status   09/01/2021 3.26 L      FVC-Pre   Date Value Ref Range Status   09/01/2021 2.46 L      FVC-%Pred-Pre   Date Value Ref Range Status   09/01/2021 75 %      FEV1-Pre   Date Value Ref Range Status   09/01/2021 2.11 L      FEV1-%Pred-Pre   Date Value Ref Range Status   09/01/2021 76 %      FEV1FVC-Pred   Date Value Ref Range Status   09/01/2021 85 %      FEV1FVC-Pre   Date Value Ref Range Status   09/01/2021 86 %      No results found for: 20029  FEFMax-Pred   Date Value Ref Range Status   09/01/2021 6.42 L/sec      FEFMax-Pre   Date Value Ref Range Status   09/01/2021 6.22 L/sec      FEFMax-%Pred-Pre   Date Value Ref Range Status   09/01/2021 97 %      ExpTime-Pre   Date Value Ref Range Status   09/01/2021 6.23 sec      FIFMax-Pre   Date Value Ref Range Status   09/01/2021 3.43 L/sec      FEV1FEV6-Pred   Date Value Ref Range Status   09/01/2021 85 %      FEV1FEV6-Pre   Date Value  Ref Range Status   09/01/2021 86 %      No results found for: 20055  Imaging (personally reviewed in clinic today):CXR  09/01/2021  Minimal streaky left basilar opacities, most likely atelectasis/scarring.      Assessment and Plan:   Mild intermittent asthma/shortness of breath  ACT score is 22.  Symptoms are persistent but are very mild.  She is not very active at baseline and still has very minimal exercise restriction.  We discussed her PFTs which showed nonspecific spirometric pattern and mildly reduced diffusion.  I explained that although her spirometric evaluation showed no clear obstruction, the non-specific pattern may be indicative of underlying mild obstruction.  Given her ongoing smoking and reduced diffusion, we also discussed COPD as a potential cause of her ongoing symptoms and that it was prudent for her to consider smoking cessation.  She has been given a prescription for nicotine patches which she has not tried yet as she is not ready to quit.  We discussed smoking cessation.  I will try her on Advair 250/50 MCG twice daily for inhalers.  She knows to rinse her mouth after each use.  We discussed trigger avoidance and an asthma action plan was formulated today.  Questions and concerns were answered to the patient's satisfaction.  she was provided with my contact information should new questions or concerns arise in the interim.    I spent a total of 60 minutes face to face with Heron Gee during today's office visit. Over 50% of this time was spent counseling the patient and/or coordinating care regarding their pulmonary disease.      She should return to clinic in 12 months with pfts    López Osborn MD  Pulmonary, Critical Care and Sleep Medicine  [facility]  Pager: [phone]        The above note was dictated using voice recognition software and may include typographical errors. Please contact the author for any clarifications.

## 2021-09-13 NOTE — PROGRESS NOTES
Heron Gee's goals for this visit include: Consult  She requests these members of her care team be copied on today's visit information: PCP    PCP: Agbeh, Cephas Mawuena    Referring Provider:  Fritz Barron MD  [address] [place] [id number]    /83   Pulse 100   Temp 98.1  F (36.7  C)   Resp 17   Ht 1.524 m (5')   Wt 93 kg (205 lb)   LMP 06/01/2021   SpO2 98%   BMI 40.04 kg/m      Do you need any medication refills at today's visit? N      Don Herbert LPN  Pulmonary Medicine:  [facility]  Phone: [phone] Fax: [phone]

## 2021-09-14 ASSESSMENT — ASTHMA QUESTIONNAIRES: ACT_TOTALSCORE: 22

## 2021-09-14 NOTE — RESULT ENCOUNTER NOTE
Results discussed directly with patient while patient was present. Any further details documented in the note.   López Osborn MD

## 2021-09-26 ENCOUNTER — HEALTH MAINTENANCE LETTER (OUTPATIENT)
Age: 36
End: 2021-09-26

## 2021-10-01 ENCOUNTER — ANESTHESIA EVENT (OUTPATIENT)
Dept: GASTROENTEROLOGY | Facility: CLINIC | Age: 36
End: 2021-10-01
Payer: COMMERCIAL

## 2021-10-01 ENCOUNTER — LAB (OUTPATIENT)
Dept: LAB | Facility: CLINIC | Age: 36
End: 2021-10-01
Payer: COMMERCIAL

## 2021-10-01 DIAGNOSIS — Z11.59 ENCOUNTER FOR SCREENING FOR OTHER VIRAL DISEASES: ICD-10-CM

## 2021-10-01 PROCEDURE — U0003 INFECTIOUS AGENT DETECTION BY NUCLEIC ACID (DNA OR RNA); SEVERE ACUTE RESPIRATORY SYNDROME CORONAVIRUS 2 (SARS-COV-2) (CORONAVIRUS DISEASE [COVID-19]), AMPLIFIED PROBE TECHNIQUE, MAKING USE OF HIGH THROUGHPUT TECHNOLOGIES AS DESCRIBED BY CMS-2020-01-R: HCPCS

## 2021-10-01 PROCEDURE — U0005 INFEC AGEN DETEC AMPLI PROBE: HCPCS

## 2021-10-01 ASSESSMENT — LIFESTYLE VARIABLES: TOBACCO_USE: 1

## 2021-10-01 NOTE — ANESTHESIA PREPROCEDURE EVALUATION
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 21     Cervical high risk HPV (human papillomavirus) test positive     04/15/2019, 10/28/19, 21     Cervix cancer (H)      Diabetes (H)      Exercise-induced asthma       Past Surgical History:   Procedure Laterality Date     APPENDECTOMY OPEN CHILD        SECTION  09/04/2019    x 2     COLPOSCOPY, CONIZATION, COMBINED N/A 4/15/2021    Procedure: Cold Knife Cone of the cervix;  Surgeon: Fritz Barron MD;  Location:  OR     ENT SURGERY       LAPAROSCOPIC HYSTERECTOMY TOTAL, SALPINGECTOMY BILATERAL Bilateral 6/10/2021    Procedure: Total laparoscopic hysterectomy, bilateral salpingectomy;  Surgeon: Fritz Barron MD;  Location: [facility] OR     LEEP TX, CERVICAL       NOSE SURGERY      Deviated septum      No Known Allergies   Social History     Tobacco Use     Smoking status: Current Every Day Smoker     Packs/day: 0.50     Years: 13.00     Pack years: 6.50     Types: Cigarettes     Smokeless tobacco: Never Used   Substance Use Topics     Alcohol use: Yes      Wt Readings from Last 1 Encounters:   21 93 kg (205 lb)        Anesthesia Evaluation   Pt has had prior anesthetic. Type: General and MAC.    No history of anesthetic complications       ROS/MED HX  ENT/Pulmonary:     (+) FRANCISCO risk factors, obese, tobacco use, Current use, asthma     Neurologic:  - neg neurologic ROS     Cardiovascular:       METS/Exercise Tolerance: >4 METS    Hematologic:  - neg hematologic  ROS     Musculoskeletal:  - neg musculoskeletal ROS     GI/Hepatic:  - neg GI/hepatic ROS     Renal/Genitourinary:  - neg Renal ROS      Endo:     (+) type II DM, Obesity,     Psychiatric/Substance Use:  - neg psychiatric ROS     Infectious Disease:       Malignancy:   (+) Malignancy,     Other:            Physical Exam    Airway        Mallampati: II   TM distance: > 3 FB    Mouth opening: > 3 cm    Respiratory Devices and Support         Dental  no notable dental history         Cardiovascular   cardiovascular exam normal          Pulmonary   pulmonary exam normal                OUTSIDE LABS:  CBC:   Lab Results   Component Value Date    WBC 11.2 (H) 08/26/2021    WBC 12.7 (H) 06/22/2021    HGB 15.3 08/26/2021    HGB 14.9 06/22/2021    HCT 45.1 08/26/2021    HCT 43.6 06/22/2021     08/26/2021     06/22/2021     BMP:   Lab Results   Component Value Date     06/22/2021     01/29/2021    POTASSIUM 4.2 06/22/2021    POTASSIUM 3.5 01/29/2021    CHLORIDE 107 06/22/2021    CHLORIDE 108 01/29/2021    CO2 24 06/22/2021    CO2 25 01/29/2021    BUN 14 06/22/2021    BUN 16 01/29/2021    CR 0.72 06/22/2021    CR 0.83 01/29/2021    GLC 92 06/22/2021    GLC 84 01/29/2021     COAGS: No results found for: PTT, INR, FIBR  POC:   Lab Results   Component Value Date    HCG Negative 06/10/2021     HEPATIC:   Lab Results   Component Value Date    ALBUMIN 3.6 06/22/2021    PROTTOTAL 7.4 06/22/2021    ALT 34 06/22/2021    AST 17 06/22/2021    ALKPHOS 81 06/22/2021    BILITOTAL 0.2 06/22/2021     OTHER:   Lab Results   Component Value Date    A1C 4.9 06/14/2019    URVASHI 8.6 06/22/2021    TSH 1.22 08/26/2021       Anesthesia Plan    ASA Status:  2   NPO Status:  NPO Appropriate    Anesthesia Type: General.   Induction: Intravenous, Propofol.   Maintenance: TIVA.        Consents    Anesthesia Plan(s) and associated risks, benefits, and realistic alternatives discussed. Questions answered and patient/representative(s) expressed understanding.     - Discussed with:  Patient         Postoperative Care    Pain management: IV analgesics, Oral pain  medications.   PONV prophylaxis: Ondansetron (or other 5HT-3), Dexamethasone or Solumedrol     Comments:                FELA Harris CRNA

## 2021-10-02 LAB — SARS-COV-2 RNA RESP QL NAA+PROBE: NEGATIVE

## 2021-10-04 ENCOUNTER — ANESTHESIA (OUTPATIENT)
Dept: GASTROENTEROLOGY | Facility: CLINIC | Age: 36
End: 2021-10-04
Payer: COMMERCIAL

## 2021-10-04 ENCOUNTER — HOSPITAL ENCOUNTER (OUTPATIENT)
Facility: CLINIC | Age: 36
Discharge: HOME | End: 2021-10-04
Attending: SURGERY | Admitting: SURGERY
Payer: COMMERCIAL

## 2021-10-04 VITALS
BODY MASS INDEX: 40.25 KG/M2 | OXYGEN SATURATION: 95 % | HEART RATE: 89 BPM | TEMPERATURE: 98.5 F | SYSTOLIC BLOOD PRESSURE: 115 MMHG | RESPIRATION RATE: 15 BRPM | HEIGHT: 60 IN | WEIGHT: 205 LBS | DIASTOLIC BLOOD PRESSURE: 81 MMHG

## 2021-10-04 LAB — COLONOSCOPY: NORMAL

## 2021-10-04 PROCEDURE — 370N000017 HC ANESTHESIA TECHNICAL FEE, PER MIN: Performed by: SURGERY

## 2021-10-04 PROCEDURE — 45380 COLONOSCOPY AND BIOPSY: CPT | Performed by: SURGERY

## 2021-10-04 PROCEDURE — 250N000011 HC RX IP 250 OP 636: Performed by: NURSE ANESTHETIST, CERTIFIED REGISTERED

## 2021-10-04 PROCEDURE — 258N000003 HC RX IP 258 OP 636: Performed by: SURGERY

## 2021-10-04 PROCEDURE — 250N000009 HC RX 250: Performed by: NURSE ANESTHETIST, CERTIFIED REGISTERED

## 2021-10-04 PROCEDURE — 250N000009 HC RX 250: Performed by: SURGERY

## 2021-10-04 PROCEDURE — 88305 TISSUE EXAM BY PATHOLOGIST: CPT | Mod: TC | Performed by: SURGERY

## 2021-10-04 RX ORDER — LIDOCAINE 40 MG/G
CREAM TOPICAL
Status: DISCONTINUED | OUTPATIENT
Start: 2021-10-04 | End: 2021-10-04 | Stop reason: HOSPADM

## 2021-10-04 RX ORDER — PROPOFOL 10 MG/ML
INJECTION, EMULSION INTRAVENOUS PRN
Status: DISCONTINUED | OUTPATIENT
Start: 2021-10-04 | End: 2021-10-04

## 2021-10-04 RX ORDER — ONDANSETRON 2 MG/ML
4 INJECTION INTRAMUSCULAR; INTRAVENOUS
Status: DISCONTINUED | OUTPATIENT
Start: 2021-10-04 | End: 2021-10-04 | Stop reason: HOSPADM

## 2021-10-04 RX ORDER — LIDOCAINE HYDROCHLORIDE 10 MG/ML
INJECTION, SOLUTION EPIDURAL; INFILTRATION; INTRACAUDAL; PERINEURAL PRN
Status: DISCONTINUED | OUTPATIENT
Start: 2021-10-04 | End: 2021-10-04

## 2021-10-04 RX ORDER — SODIUM CHLORIDE, SODIUM LACTATE, POTASSIUM CHLORIDE, CALCIUM CHLORIDE 600; 310; 30; 20 MG/100ML; MG/100ML; MG/100ML; MG/100ML
INJECTION, SOLUTION INTRAVENOUS CONTINUOUS
Status: DISCONTINUED | OUTPATIENT
Start: 2021-10-04 | End: 2021-10-04 | Stop reason: HOSPADM

## 2021-10-04 RX ORDER — PROPOFOL 10 MG/ML
INJECTION, EMULSION INTRAVENOUS CONTINUOUS PRN
Status: DISCONTINUED | OUTPATIENT
Start: 2021-10-04 | End: 2021-10-04

## 2021-10-04 RX ADMIN — LIDOCAINE HYDROCHLORIDE 50 MG: 10 INJECTION, SOLUTION EPIDURAL; INFILTRATION; INTRACAUDAL; PERINEURAL at 07:36

## 2021-10-04 RX ADMIN — PROPOFOL 100 MG: 10 INJECTION, EMULSION INTRAVENOUS at 07:36

## 2021-10-04 RX ADMIN — PROPOFOL 200 MCG/KG/MIN: 10 INJECTION, EMULSION INTRAVENOUS at 07:36

## 2021-10-04 RX ADMIN — LIDOCAINE HYDROCHLORIDE 0.1 ML: 10 INJECTION, SOLUTION EPIDURAL; INFILTRATION; INTRACAUDAL; PERINEURAL at 06:57

## 2021-10-04 RX ADMIN — PROPOFOL 30 MG: 10 INJECTION, EMULSION INTRAVENOUS at 07:38

## 2021-10-04 RX ADMIN — SODIUM CHLORIDE, POTASSIUM CHLORIDE, SODIUM LACTATE AND CALCIUM CHLORIDE: 600; 310; 30; 20 INJECTION, SOLUTION INTRAVENOUS at 06:56

## 2021-10-04 ASSESSMENT — MIFFLIN-ST. JEOR: SCORE: 1551.37

## 2021-10-04 NOTE — OP NOTE
Colonoscopy - normal colon. No masses, polyps or diverticula.  Random biopsies taken for microscopic colitis.

## 2021-10-04 NOTE — ANESTHESIA CARE TRANSFER NOTE
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    Anesthesia Type:   General     Note:    Oropharynx: spontaneously breathing  Level of Consciousness: awake  Oxygen Supplementation: nasal cannula  Level of Supplemental Oxygen (L/min / FiO2): 3  Independent Airway: airway patency satisfactory and stable  Dentition: dentition unchanged  Vital Signs Stable: post-procedure vital signs reviewed and stable  Report to RN Given: handoff report given  Patient transferred to: Phase II    Handoff Report: Identifed the Patient, Identified the Reponsible Provider, Reviewed the pertinent medical history, Discussed the surgical course, Reviewed Intra-OP anesthesia mangement and issues during anesthesia, Set expectations for post-procedure period and Allowed opportunity for questions and acknowledgement of understanding      Vitals:  Vitals Value Taken Time   /75 10/04/21 0800   Temp     Pulse 88 10/04/21 0800   Resp 15 10/04/21 0753   SpO2 96 % 10/04/21 0804   Vitals shown include unvalidated device data.    Electronically Signed By: FELA Nair CRNA  October 4, 2021  8:06 AM

## 2021-10-04 NOTE — H&P
36 year old year old female here for colonoscopy for changes in bowel habits.    Patient Active Problem List   Diagnosis     Supervision of normal first pregnancy     Obesity     Family history of diabetes mellitus     Cervical cancer (H)     Previous  delivery, antepartum condition or complication     Squamous cell carcinoma of cervix, stage 1 (H)     Morbid obesity (H)     Tobacco use disorder     Hx of human papillomavirus infection       Past Medical History:   Diagnosis Date     Abnormal Pap smear of cervix     4/17/15, 2019. 21     Cervical high risk HPV (human papillomavirus) test positive     04/15/2019, 10/28/19, 21     Cervix cancer (H)      Diabetes (H)      Exercise-induced asthma        Past Surgical History:   Procedure Laterality Date     APPENDECTOMY OPEN CHILD        SECTION  09/04/2019    x 2     COLPOSCOPY, CONIZATION, COMBINED N/A 4/15/2021    Procedure: Cold Knife Cone of the cervix;  Surgeon: Fritz Barron MD;  Location:  OR     ENT SURGERY       LAPAROSCOPIC HYSTERECTOMY TOTAL, SALPINGECTOMY BILATERAL Bilateral 6/10/2021    Procedure: Total laparoscopic hysterectomy, bilateral salpingectomy;  Surgeon: Fritz Barron MD;  [place] [facility] [place]     LEEP [place], CERVICAL       NOSE SURGERY      Deviated septum       @[facility]@    No current outpatient medications on file.       No Known Allergies    Pt reports that she has been smoking cigarettes. She has a 6.50 pack-year smoking history. She has never used smokeless tobacco. She reports current alcohol use. She reports that she does not use drugs.    Exam:  /88 (BP Location: Right arm)   Pulse 100   Temp 98.5  F (36.9  C) (Oral)   Resp 18   Ht 1.524 m (5')   Wt 93 kg (205 lb)   LMP 2021   SpO2 97%   BMI 40.04 kg/m      Awake, Alert OX3  Lungs - CTA bilaterally  CV - RRR, no murmurs, distal pulses intact  Abd - soft, non-distended, non-tender, +BS  Extr - No cyanosis or edema    A/P 36 year old year old  female in need of colonoscopy for changes in bowel habits.. Risks, benefits, alternatives, and complications were discussed including the possibility of perforation and the patient agreed to proceed.    Cl Comer MD

## 2021-10-04 NOTE — ANESTHESIA POSTPROCEDURE EVALUATION
Patient: Heron Gee    Procedure(s):  COLONOSCOPY, WITH POLYPECTOMY AND BIOPSY    Diagnosis:Diarrhea, unspecified type [R19.7]  Squamous cell carcinoma in situ (SCCIS) of cervix [D06.9]  History of human papilloma virus [Z86.19]  Diagnosis Additional Information: No value filed.    Anesthesia Type:  General    Note:  Disposition: Outpatient   Postop Pain Control: Uneventful            Sign Out: Well controlled pain   PONV: No   Neuro/Psych: Uneventful            Sign Out: Acceptable/Baseline neuro status   Airway/Respiratory: Uneventful            Sign Out: Acceptable/Baseline resp. status   CV/Hemodynamics: Uneventful            Sign Out: Acceptable CV status; No obvious hypovolemia; No obvious fluid overload   Other NRE: NONE   DID A NON-ROUTINE EVENT OCCUR? No           Last vitals:  Vitals Value Taken Time   /75 10/04/21 0800   Temp     Pulse 88 10/04/21 0800   Resp 15 10/04/21 0753   SpO2 97 % 10/04/21 0804   Vitals shown include unvalidated device data.    Electronically Signed By: FELA Nair CRNA  October 4, 2021  8:06 AM

## 2021-10-04 NOTE — ADDENDUM NOTE
Addendum  created 10/04/21 0813 by Huyen Allison APRN CRNA    Flowsheet accepted, Intraprocedure Flowsheets edited

## 2021-10-06 LAB
PATH REPORT.COMMENTS IMP SPEC: NORMAL
PATH REPORT.COMMENTS IMP SPEC: NORMAL
PATH REPORT.FINAL DX SPEC: NORMAL
PATH REPORT.GROSS SPEC: NORMAL
PATH REPORT.MICROSCOPIC SPEC OTHER STN: NORMAL
PATH REPORT.RELEVANT HX SPEC: NORMAL
PHOTO IMAGE: NORMAL

## 2021-10-06 PROCEDURE — 88305 TISSUE EXAM BY PATHOLOGIST: CPT | Mod: 26 | Performed by: PATHOLOGY

## 2021-10-20 ENCOUNTER — TELEPHONE (OUTPATIENT)
Dept: PULMONOLOGY | Facility: CLINIC | Age: 36
End: 2021-10-20

## 2021-10-20 NOTE — TELEPHONE ENCOUNTER
Prior Authorization Retail Medication Request    Medication/Dose: fluticasone-salmeterol (ADVAIR) 250-50 MCG/DOSE inhaler  ICD code (if different than what is on RX):    Previously Tried and Failed:    Rationale:      Insurance Name:    Insurance ID:        Pharmacy Information (if different than what is on RX)  Name:    Phone:

## 2021-10-20 NOTE — TELEPHONE ENCOUNTER
P/A not needed. Insurance prefers Name Brand Advair.    Medication: fluticasone-salmeterol (ADVAIR) 250-50 MCG/DOSE inhaler  Insurance Company: [employer] - Phone [phone] Fax [phone]  Expected CoPay: $3.00  Pharmacy Filling the Rx: [id number] - [place], [place] - [address] [address] AT [address] [facility]  Pharmacy Notified: Yes - spoke to male pharmacist, confirmed they processed Name Brand Advair for a $3.00 copay and patient has picked up medication.  Patient Notified: No

## 2021-10-21 ENCOUNTER — IMMUNIZATION (OUTPATIENT)
Dept: FAMILY MEDICINE | Facility: CLINIC | Age: 36
End: 2021-10-21
Payer: COMMERCIAL

## 2021-10-21 DIAGNOSIS — Z23 NEED FOR PROPHYLACTIC VACCINATION AND INOCULATION AGAINST INFLUENZA: Primary | ICD-10-CM

## 2021-10-21 PROCEDURE — 90471 IMMUNIZATION ADMIN: CPT

## 2021-10-21 PROCEDURE — 90686 IIV4 VACC NO PRSV 0.5 ML IM: CPT

## 2021-10-21 PROCEDURE — 99207 PR NO CHARGE NURSE ONLY: CPT

## 2022-01-03 ENCOUNTER — ONCOLOGY VISIT (OUTPATIENT)
Dept: ONCOLOGY | Facility: CLINIC | Age: 37
End: 2022-01-03
Attending: OBSTETRICS & GYNECOLOGY
Payer: COMMERCIAL

## 2022-01-03 VITALS
HEART RATE: 82 BPM | SYSTOLIC BLOOD PRESSURE: 125 MMHG | OXYGEN SATURATION: 95 % | BODY MASS INDEX: 41.62 KG/M2 | DIASTOLIC BLOOD PRESSURE: 85 MMHG | HEIGHT: 60 IN | WEIGHT: 212 LBS

## 2022-01-03 DIAGNOSIS — C53.9 SQUAMOUS CELL CARCINOMA OF CERVIX, STAGE 1 (H): Primary | ICD-10-CM

## 2022-01-03 PROCEDURE — 99214 OFFICE O/P EST MOD 30 MIN: CPT | Performed by: OBSTETRICS & GYNECOLOGY

## 2022-01-03 ASSESSMENT — PAIN SCALES - GENERAL: PAINLEVEL: NO PAIN (0)

## 2022-01-03 ASSESSMENT — MIFFLIN-ST. JEOR: SCORE: 1583.13

## 2022-01-03 NOTE — NURSING NOTE
Oncology Rooming Note    January 3, 2022 3:30 PM   Heron Gee is a 36 year old female who presents for:    Chief Complaint   Patient presents with     Oncology Clinic Visit     Cervical Cancer follow up     Initial Vitals: /85 (BP Location: Left arm, Patient Position: Chair, Cuff Size: Adult Large)   Pulse 82   Ht 1.524 m (5')   Wt 96.2 kg (212 lb)   LMP 06/01/2021   SpO2 95%   BMI 41.40 kg/m   Estimated body mass index is 41.4 kg/m  as calculated from the following:    Height as of this encounter: 1.524 m (5').    Weight as of this encounter: 96.2 kg (212 lb). Body surface area is 2.02 meters squared.  No Pain (0) Comment: Data Unavailable   Patient's last menstrual period was 06/01/2021.  Allergies reviewed: Yes  Medications reviewed: Yes    Medications: Medication refills not needed today.  Pharmacy name entered into Sp[facility]: [id number] - [place], [place] - [id number] [address] AT [address]    Clinical concerns: NO Dr. Barron was notified.       Jacinda Muse, CMA

## 2022-01-03 NOTE — LETTER
1/3/2022         RE: Heron Gee  [id number] [address] [place] [address]        Dear Colleague,    Thank you for referring your patient, Heron Gee, to the [facility]. Please see a copy of my visit note below.    Gynecologic Oncology Clinic - Established Patient Visit    Visit date: Pratik 3, 2022     CC: cervical cancer surveillance visit    Interval history: Heron Gee is a 36 year old  pre-menopausal female with a history of Stage 1A1 squamous cell cancer of the cervix here for a surveillance visit.    She is overall feeling very well. She started working out which has given her more energy and helped her to feel better overall. She has no vaginal bleeding. No dyspareunia. No pelvic pain. No change in bladder or bowel.       Relevant history:  2019 ASCUS/HPV HR positive  10/2019: Pap negative, HPV HR positive  2019: ECC negative  2021: Pap ASCUS/HPV other HR positive  2021: Colposcopy with EUGENE 2 biopsy and neg ECC  3/15/2021: LEEP with 2.2mm invasive SCC of the cervix extending to endocervical margin, neg LVSI  4/15/2021: CKC, ECC with no residual carcinoma  6/10/2021: Total laparoscopic hysterectomy, bilateral salpingectomy      Review of Systems:  As per HPI, otherwise non-contributory.     Past Surgical History:  Past Surgical History:   Procedure Laterality Date     APPENDECTOMY OPEN CHILD        SECTION  09/04/2019    x 2     COLONOSCOPY N/A 10/4/2021    Procedure: COLONOSCOPY, WITH POLYPECTOMY AND BIOPSY;  Surgeon: Cl Comer MD;  Location: [facility]     COLPOSCOPY, CONIZATION, COMBINED N/A 4/15/2021    Procedure: Cold Knife Cone of the cervix;  Surgeon: Fritz Barron MD;  Location: [facility]     ENT SURGERY       LAPAROSCOPIC HYSTERECTOMY TOTAL, SALPINGECTOMY BILATERAL Bilateral 6/10/2021    Procedure: Total laparoscopic hysterectomy, bilateral salpingectomy;  Surgeon: Fritz Barron MD;  Location: [facility] [place] [facility] [place]     LEEP  TX, CERVICAL       NOSE SURGERY      Deviated septum        Physical Exam:  /85 (BP Location: Left arm, Patient Position: Chair, Cuff Size: Adult Large)   Pulse 82   Ht 1.524 m (5')   Wt 96.2 kg (212 lb)   LMP 2021   SpO2 95%   BMI 41.40 kg/m     General appearance: no acute distress, well groomed, sitting comfortably   Lymph: no supraclavicular, cervical, or inguinal adenopathy  GI: abdomen soft, incisions well healed, no nodules  : normal external genitalia, some folliculitis, vagina normal mucosa without lesions, slight bulging of the anterior vaginal wall, no nodularity   Rectovaginal exam: confirmatory,     Lab:  Na    Assessment:  Heron is a 36 year old  pre-menopausal female with Stage 1A1 squamous cell carcinoma of the cervix here for surveillacne visit with no concerns on exam.    Plan:  - Squamous cell carcinoma of the cervix: No concerns for recurrence. Well healed from surgery.   - Recommend regular follow-up with every 6 mos exam for 2 years (2023) and then annual follow-up until at least 5 years from diagnosis (2026).   - She should have vaginal Pap smear, cytology only, every 12 mos. First would be due 2022.   - Return to clinic in 6 mos for exam with Pap smear    I spent a total of 30 min on the care of Heron Gee on the day of service.    Fritz Barron MD     Gynecologic Oncology       Again, thank you for allowing me to participate in the care of your patient.        Sincerely,        Fritz Barron MD

## 2022-01-03 NOTE — PROGRESS NOTES
Gynecologic Oncology Clinic - Established Patient Visit    Visit date: Pratik 3, 2022     CC: cervical cancer surveillance visit    Interval history: Heron Gee is a 36 year old  pre-menopausal female with a history of Stage 1A1 squamous cell cancer of the cervix here for a surveillance visit.    She is overall feeling very well. She started working out which has given her more energy and helped her to feel better overall. She has no vaginal bleeding. No dyspareunia. No pelvic pain. No change in bladder or bowel.       Relevant history:  2019 ASCUS/HPV HR positive  10/2019: Pap negative, HPV HR positive  2019: ECC negative  2021: Pap ASCUS/HPV other HR positive  2021: Colposcopy with EUGENE 2 biopsy and neg ECC  3/15/2021: LEEP with 2.2mm invasive SCC of the cervix extending to endocervical margin, neg LVSI  4/15/2021: CKC, ECC with no residual carcinoma  6/10/2021: Total laparoscopic hysterectomy, bilateral salpingectomy      Review of Systems:  As per HPI, otherwise non-contributory.     Past Surgical History:  Past Surgical History:   Procedure Laterality Date     APPENDECTOMY OPEN CHILD        SECTION  09/04/2019    x 2     COLONOSCOPY N/A 10/4/2021    Procedure: COLONOSCOPY, WITH POLYPECTOMY AND BIOPSY;  Surgeon: Cl Comer MD;  Location: [facility]     COLPOSCOPY, CONIZATION, COMBINED N/A 4/15/2021    Procedure: Cold Knife Cone of the cervix;  Surgeon: Fritz Barron MD;  Location: [facility]     ENT SURGERY       LAPAROSCOPIC HYSTERECTOMY TOTAL, SALPINGECTOMY BILATERAL Bilateral 6/10/2021    Procedure: Total laparoscopic hysterectomy, bilateral salpingectomy;  Surgeon: Fritz Barron MD;  [place] [facility] [place]     LEEP TX, CERVICAL       NOSE SURGERY      Deviated septum        Physical Exam:  /85 (BP Location: Left arm, Patient Position: Chair, Cuff Size: Adult Large)   Pulse 82   Ht 1.524 m (5')   Wt 96.2 kg (212 lb)   LMP 2021   SpO2 95%   BMI 41.40 kg/m      General appearance: no acute distress, well groomed, sitting comfortably   Lymph: no supraclavicular, cervical, or inguinal adenopathy  GI: abdomen soft, incisions well healed, no nodules  : normal external genitalia, some folliculitis, vagina normal mucosa without lesions, slight bulging of the anterior vaginal wall, no nodularity   Rectovaginal exam: confirmatory,     Lab:  Na    Assessment:  Heron is a 36 year old  pre-menopausal female with Stage 1A1 squamous cell carcinoma of the cervix here for surveillacne visit with no concerns on exam.    Plan:  - Squamous cell carcinoma of the cervix: No concerns for recurrence. Well healed from surgery.   - Recommend regular follow-up with every 6 mos exam for 2 years (2023) and then annual follow-up until at least 5 years from diagnosis (2026).   - She should have vaginal Pap smear, cytology only, every 12 mos. First would be due 2022.   - Return to clinic in 6 mos for exam with Pap smear    I spent a total of 30 min on the care of Heron Gee on the day of service.    Fritz Barron MD     Gynecologic Oncology

## 2022-02-13 ENCOUNTER — E-VISIT (OUTPATIENT)
Dept: URGENT CARE | Facility: URGENT CARE | Age: 37
End: 2022-02-13
Payer: COMMERCIAL

## 2022-02-13 DIAGNOSIS — J01.90 ACUTE SINUSITIS WITH SYMPTOMS > 10 DAYS: Primary | ICD-10-CM

## 2022-02-13 PROCEDURE — 99421 OL DIG E/M SVC 5-10 MIN: CPT | Performed by: FAMILY MEDICINE

## 2022-02-13 RX ORDER — FLUTICASONE PROPIONATE 50 MCG
1 SPRAY, SUSPENSION (ML) NASAL DAILY
Qty: 15.8 ML | Refills: 0 | Status: SHIPPED | OUTPATIENT
Start: 2022-02-13 | End: 2023-08-03

## 2022-02-13 NOTE — PATIENT INSTRUCTIONS
Dear Heron Gee    After reviewing your responses, I've been able to diagnose you with?a sinus infection caused by bacteria.?     Based on your responses and diagnosis, I have prescribed Augmentin and Flonase to treat your symptoms. I have sent this to your pharmacy.?     It is also important to stay well hydrated, get lots of rest and take over-the-counter decongestants,?tylenol?or ibuprofen if you?are able to?take those medications per your primary care provider to help relieve discomfort.?     It is important that you take?all of?your prescribed medication even if your symptoms are improving after a few doses.? Taking?all of?your medicine helps prevent the symptoms from returning.?     If your symptoms worsen, you develop severe headache, vomiting, high fever (>102), or are not improving in 7 days, please contact your primary care provider for an appointment or visit any of our convenient Walk-in Care or Urgent Care Centers to be seen which can be found on our website?here.?     Thanks again for choosing?us?as your health care partner,?   ?  Lennox Gore, DO?

## 2022-03-12 ENCOUNTER — HEALTH MAINTENANCE LETTER (OUTPATIENT)
Age: 37
End: 2022-03-12

## 2022-05-09 ENCOUNTER — ONCOLOGY VISIT (OUTPATIENT)
Dept: ONCOLOGY | Facility: CLINIC | Age: 37
End: 2022-05-09
Payer: COMMERCIAL

## 2022-05-09 VITALS
HEIGHT: 60 IN | WEIGHT: 212.8 LBS | OXYGEN SATURATION: 96 % | RESPIRATION RATE: 18 BRPM | SYSTOLIC BLOOD PRESSURE: 122 MMHG | DIASTOLIC BLOOD PRESSURE: 86 MMHG | HEART RATE: 83 BPM | BODY MASS INDEX: 41.78 KG/M2

## 2022-05-09 DIAGNOSIS — R53.83 FATIGUE, UNSPECIFIED TYPE: ICD-10-CM

## 2022-05-09 DIAGNOSIS — N95.1 MENOPAUSAL SYNDROME (HOT FLASHES): ICD-10-CM

## 2022-05-09 DIAGNOSIS — C53.9 SQUAMOUS CELL CARCINOMA OF CERVIX, STAGE 1 (H): Primary | ICD-10-CM

## 2022-05-09 LAB
FSH SERPL-ACNC: 6.1 IU/L
LH SERPL-ACNC: 6.7 IU/L
TSH SERPL DL<=0.005 MIU/L-ACNC: 1.64 MU/L (ref 0.4–4)

## 2022-05-09 PROCEDURE — 84443 ASSAY THYROID STIM HORMONE: CPT | Performed by: OBSTETRICS & GYNECOLOGY

## 2022-05-09 PROCEDURE — 36415 COLL VENOUS BLD VENIPUNCTURE: CPT | Performed by: OBSTETRICS & GYNECOLOGY

## 2022-05-09 PROCEDURE — 83001 ASSAY OF GONADOTROPIN (FSH): CPT | Performed by: OBSTETRICS & GYNECOLOGY

## 2022-05-09 PROCEDURE — 99214 OFFICE O/P EST MOD 30 MIN: CPT | Performed by: OBSTETRICS & GYNECOLOGY

## 2022-05-09 PROCEDURE — 83002 ASSAY OF GONADOTROPIN (LH): CPT | Performed by: OBSTETRICS & GYNECOLOGY

## 2022-05-09 ASSESSMENT — PAIN SCALES - GENERAL: PAINLEVEL: NO PAIN (0)

## 2022-05-09 NOTE — NURSING NOTE
Oncology Rooming Note    May 9, 2022 3:04 PM   Heron Gee is a 37 year old female who presents for:    Chief Complaint   Patient presents with     Oncology Clinic Visit     6 month follow up     Initial Vitals: /86 (BP Location: Right arm)   Pulse 83   Resp 18   Ht 1.524 m (5')   Wt 96.5 kg (212 lb 12.8 oz)   LMP 06/01/2021   SpO2 96%   BMI 41.56 kg/m   Estimated body mass index is 41.56 kg/m  as calculated from the following:    Height as of this encounter: 1.524 m (5').    Weight as of this encounter: 96.5 kg (212 lb 12.8 oz). Body surface area is 2.02 meters squared.  No Pain (0) Comment: Data Unavailable   Patient's last menstrual period was 06/01/2021.  Allergies reviewed: Yes  Medications reviewed: Yes    Medications: Medication refills not needed today.  Pharmacy name entered into [employer]: [facility][id number] - [place], [place] - [id number] AT [address]    Clinical concerns: increasing fatigue and hot flashes       April ANDREAS Villanueva

## 2022-05-11 LAB
BKR LAB AP GYN ADEQUACY: NORMAL
BKR LAB AP GYN INTERPRETATION: NORMAL
BKR LAB AP HPV REFLEX: NORMAL
BKR LAB AP PREVIOUS ABNL DX: NORMAL
BKR LAB AP PREVIOUS ABNORMAL: NORMAL
PATH REPORT.COMMENTS IMP SPEC: NORMAL
PATH REPORT.COMMENTS IMP SPEC: NORMAL
PATH REPORT.RELEVANT HX SPEC: NORMAL

## 2022-05-16 NOTE — PROGRESS NOTES
Gynecologic Oncology Clinic - Established Patient Visit    Visit date: May 9, 2022     CC: cervical cancer surveillance visit    Interval history: Heron Gee is a 37 year old  pre-menopausal female with a history of Stage 1A1 squamous cell cancer of the cervix here for a surveillance visit.    She notes hot flashes and night sweats since around the time of surgery, wondering if this could be related. Otherwise she is doing well. She has no pelvic pain or vaginal bleeding. No post-coital spotting, no dyspareunia.     Relevant history:  2019 ASCUS/HPV HR positive  10/2019: Pap negative, HPV HR positive  2019: ECC negative  2021: Pap ASCUS/HPV other HR positive  2021: Colposcopy with EUGENE 2 biopsy and neg ECC  3/15/2021: LEEP with 2.2mm invasive SCC of the cervix extending to endocervical margin, neg LVSI  4/15/2021: CKC, ECC with no residual carcinoma  6/10/2021: Total laparoscopic hysterectomy, bilateral salpingectomy      Review of Systems:  As per HPI, otherwise non-contributory.     Past Surgical History:  Past Surgical History:   Procedure Laterality Date     APPENDECTOMY OPEN CHILD        SECTION  09/04/2019    x 2     COLONOSCOPY N/A 10/4/2021    Procedure: COLONOSCOPY, WITH POLYPECTOMY AND BIOPSY;  Surgeon: Cl Comer MD;  Location: [facility]     COLPOSCOPY, CONIZATION, COMBINED N/A 4/15/2021    Procedure: Cold Knife Cone of the cervix;  Surgeon: Fritz Barron MD;  Location:  OR     ENT SURGERY       LAPAROSCOPIC HYSTERECTOMY TOTAL, SALPINGECTOMY BILATERAL Bilateral 6/10/2021    Procedure: Total laparoscopic hysterectomy, bilateral salpingectomy;  Surgeon: Fritz Barron MD;  [place] [facility] [place]     LEEP TX, CERVICAL       NOSE SURGERY      Deviated septum        Physical Exam:  /86 (BP Location: Right arm)   Pulse 83   Resp 18   Ht 1.524 m (5')   Wt 96.5 kg (212 lb 12.8 oz)   LMP 2021   SpO2 96%   BMI 41.56 kg/m     General appearance: no acute  distress, well groomed, sitting comfortably   Lymph: no supraclavicular, cervical, or inguinal adenopathy  GI: abdomen soft, incisions well healed, no nodules  : normal external genitalia, vagina normal mucosa without lesions, slight bulging of the anterior vaginal wall, no nodularity, Pap collected    Lab:  2022 Pap smear collected - negative cytology    Assessment:  Heron is a 37 year old  pre-menopausal female with Stage 1A1 squamous cell carcinoma of the cervix here for surveillance visit with no concerns on exam. Surveillance annual Pap collected today. Also with vasomotor symptoms     Plan:  - Squamous cell carcinoma of the cervix: No concerns for recurrence. Well healed from surgery.   - Recommend regular follow-up with every 6 mos exam for 2 years (2023) and then annual follow-up until at least 5 years from diagnosis (2026).   - She should have vaginal Pap smear, cytology only, every 12 mos. Done today.  - Return to clinic in 6 mos for exam.    Vasomotor symptoms: FSH/LH and TSH collected today, which were all within normal ranges. No signs of premature menopause or thyroid dysfunction as etiology. Work with primary care provider if this continues.     I spent a total of 30 min on the care of Heron Gee on the day of service.    Fritz Barron MD     Gynecologic Oncology

## 2022-05-31 ENCOUNTER — PATIENT OUTREACH (OUTPATIENT)
Dept: OBGYN | Facility: CLINIC | Age: 37
End: 2022-05-31
Payer: COMMERCIAL

## 2022-05-31 NOTE — TELEPHONE ENCOUNTER
"6/10/21 Hyster with GYN/ONC Path Neg for malignancy. Plan Pap in 1 year due 06/10/22. Provider informed the pt of the results and recommendations.   1/3/22 Gyn Onc visit. Plan: \"Recommend regular follow-up with every 6 mos exam for 2 years (6/2023) and then annual follow-up until at least 5 years from diagnosis (6/2026).   - She should have vaginal Pap smear, cytology only, every 12 mos. First would be due 6/2022.   - Return to clinic in 6 mos for exam with Pap smear  5/9/22 Gyn Onc visit - NIL vaginal pap. Plan: cytology only in 1 yr.   6/13/23 Gyn Onc visit.   "

## 2022-08-10 ENCOUNTER — TELEPHONE (OUTPATIENT)
Dept: PULMONOLOGY | Facility: CLINIC | Age: 37
End: 2022-08-10

## 2022-08-10 NOTE — TELEPHONE ENCOUNTER
8/10 Provided phone number [phone] to schedule short PFT 45 minutes before appointment with Dr. Osborn on 9/14.    Brittani jacques Procedure   Orthopedics, Podiatry, Sports Medicine, ENT/Eye Specialties  [facility]   [phone]      ----- Message from SHARYN WATSON sent at 8/10/2022 10:16 AM CDT -----  Regarding: Short 45 min PFT  Hello,  Can someone please assist pt in scheduling a short 45 min PFT prior to pt seeing Dr. Osborn for follow up 09/14? Thanks so much!    Sharyn Watson LPN  Pulmonary Medicine:  [facility]  Phone: [phone] Fax: [phone]

## 2022-08-16 NOTE — TELEPHONE ENCOUNTER
8/16 2nd attempt.  Provided phone number [phone] to schedule short PFT 45 minutes before appointment with Dr. Osborn on 9/14.     Brittani jacques Procedure   Orthopedics, Podiatry, Sports Medicine, ENT/Eye Specialties  [facility] and Surgery [facility]   [phone]

## 2022-11-21 ENCOUNTER — ONCOLOGY VISIT (OUTPATIENT)
Dept: ONCOLOGY | Facility: CLINIC | Age: 37
End: 2022-11-21
Attending: OBSTETRICS & GYNECOLOGY
Payer: COMMERCIAL

## 2022-11-21 VITALS
WEIGHT: 211 LBS | OXYGEN SATURATION: 98 % | HEIGHT: 60 IN | BODY MASS INDEX: 41.43 KG/M2 | DIASTOLIC BLOOD PRESSURE: 87 MMHG | SYSTOLIC BLOOD PRESSURE: 128 MMHG | HEART RATE: 84 BPM

## 2022-11-21 DIAGNOSIS — C53.9 SQUAMOUS CELL CARCINOMA OF CERVIX, STAGE 1 (H): Primary | ICD-10-CM

## 2022-11-21 PROCEDURE — 99213 OFFICE O/P EST LOW 20 MIN: CPT | Performed by: OBSTETRICS & GYNECOLOGY

## 2022-11-21 ASSESSMENT — PAIN SCALES - GENERAL: PAINLEVEL: NO PAIN (0)

## 2022-11-21 NOTE — PROGRESS NOTES
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    She is overall feeling well.  Her hot flashes that she previously noted have almost entirely resolved.  She does still have some periods where she feels she gets a little sweaty.  No changes to her medical history.  No vaginal bleeding pelvic pain or changes to bladder or bowel function.    Relevant history:  2019 ASCUS/HPV HR positive  10/2019: Pap negative, HPV HR positive  2019: ECC negative  2021: Pap ASCUS/HPV other HR positive  2021: Colposcopy with EUGENE 2 biopsy and neg ECC  3/15/2021: LEEP with 2.2mm invasive SCC of the cervix extending to endocervical margin, neg LVSI  4/15/2021: CKC, ECC with no residual carcinoma  6/10/2021: Total laparoscopic hysterectomy, bilateral salpingectomy      Review of Systems:  As per HPI, otherwise non-contributory.     Past Surgical History:  Past Surgical History:   Procedure Laterality Date     APPENDECTOMY OPEN CHILD        SECTION  09/04/2019    x 2     COLONOSCOPY N/A 10/4/2021    Procedure: COLONOSCOPY, WITH POLYPECTOMY AND BIOPSY;  Surgeon: Cl Comer MD;  Location: [facility]     COLPOSCOPY, CONIZATION, COMBINED N/A 4/15/2021    Procedure: Cold Knife Cone of the cervix;  Surgeon: Fritz Barron MD;  Location: [facility]     ENT SURGERY       LAPAROSCOPIC HYSTERECTOMY TOTAL, SALPINGECTOMY BILATERAL Bilateral 6/10/2021    Procedure: Total laparoscopic hysterectomy, bilateral salpingectomy;  Surgeon: Fritz Barron MD;  [place] [facility] [place]     LEEP [place], CERVICAL       NOSE SURGERY      Deviated septum        Physical Exam:  /87 (BP Location: Left arm, Patient Position: Chair, Cuff Size: Adult Large)    Pulse 84   Ht 1.524 m (5')   Wt 95.7 kg (211 lb)   LMP 2021   SpO2 98%   BMI 41.21 kg/m     General appearance: no acute distress, well groomed, sitting comfortably   Lymph: no supraclavicular, cervical, or inguinal adenopathy  : normal external genitalia with verrucous lesion at 5:00, vagina normal mucosa without lesions, slight bulging of the anterior vaginal wall, no nodularity    Lab:  2022 Pap smear - negative cytology    Assessment:  Heron is a 37 year old  pre-menopausal female with Stage 1A1 squamous cell carcinoma of the cervix s/p total laparoscopic hysterectomy, bilateral salpingectomy here for surveillance visit with no concerns on exam. Surveillance annual Pap collected today. Also with vasomotor symptoms     Plan:  - Squamous cell carcinoma of the cervix: No concerns for recurrence. Recommend regular follow-up with every 6 mos exam for 2 years (2023) and then annual follow-up until at least 5 years from diagnosis (2026). She should have vaginal Pap smear, cytology only, every 12 mos. Next due 2023 (every spring)  - Return to clinic in 6 mos for exam with Pap smear.    I spent a total of 30 min on the care of Heron Gee on the day of service.    Fritz Barron MD     Gynecologic Oncology

## 2022-11-21 NOTE — NURSING NOTE
Oncology Rooming Note    November 21, 2022 12:55 PM   Heron Gee is a 37 year old female who presents for:    Chief Complaint   Patient presents with     Oncology Clinic Visit     Follow up     Initial Vitals: /87 (BP Location: Left arm, Patient Position: Chair, Cuff Size: Adult Large)   Pulse 84   Ht 1.524 m (5')   Wt 95.7 kg (211 lb)   LMP 06/01/2021   SpO2 98%   BMI 41.21 kg/m   Estimated body mass index is 41.21 kg/m  as calculated from the following:    Height as of this encounter: 1.524 m (5').    Weight as of this encounter: 95.7 kg (211 lb). Body surface area is 2.01 meters squared.  No Pain (0) Comment: Data Unavailable   Patient's last menstrual period was 06/01/2021.  Allergies reviewed: Yes  Medications reviewed: Yes    Medications: Medication refills not needed today.  Pharmacy name entered into [employer]: [facility][id number] - [place], [place] - [id number] [address] [facility] [address] AT [address]    Clinical concerns: NO Dr. Barron was notified.      Jacinda Muse, CMA

## 2023-01-08 ENCOUNTER — HEALTH MAINTENANCE LETTER (OUTPATIENT)
Age: 38
End: 2023-01-08

## 2023-03-06 NOTE — LETTER
2022         RE: Heron Gee  [address] [facility] [facility] [place] [address]        Dear Colleague,    Thank you for referring your patient, Heron Gee, to the [facility]. Please see a copy of my visit note below.    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    She is overall feeling well.  Her hot flashes that she previously noted have almost entirely resolved.  She does still have some periods where she feels she gets a little sweaty.  No changes to her medical history.  No vaginal bleeding pelvic pain or changes to bladder or bowel function.    Relevant history:  2019 ASCUS/HPV HR positive  10/2019: Pap negative, HPV HR positive  2019: ECC negative  2021: Pap ASCUS/HPV other HR positive  2021: Colposcopy with EUGENE 2 biopsy and neg ECC  3/15/2021: LEEP with 2.2mm invasive SCC of the cervix extending to endocervical margin, neg LVSI  4/15/2021: CKC, ECC with no residual carcinoma  6/10/2021: Total laparoscopic hysterectomy, bilateral salpingectomy      Review of Systems:  As per HPI, otherwise non-contributory.     Past Surgical History:  Past Surgical History:   Procedure Laterality Date     APPENDECTOMY OPEN CHILD        SECTION  09/04/2019    x 2     COLONOSCOPY N/A 10/4/2021    Procedure: COLONOSCOPY, WITH POLYPECTOMY AND BIOPSY;  Surgeon: Cl Comer MD;  Location: [facility]     COLPOSCOPY, CONIZATION, COMBINED N/A 4/15/2021    Procedure: Cold Knife Cone of the cervix;  Surgeon: Fritz Barron MD;  Location:  OR     ENT SURGERY       LAPAROSCOPIC HYSTERECTOMY TOTAL, SALPINGECTOMY BILATERAL Bilateral 6/10/2021     Procedure: Total laparoscopic hysterectomy, bilateral salpingectomy;  Surgeon: Fritz Barron MD;  [place] [facility] [place]     LEEP TX, CERVICAL       NOSE SURGERY      Deviated septum        Physical Exam:  /87 (BP Location: Left arm, Patient Position: Chair, Cuff Size: Adult Large)   Pulse 84   Ht 1.524 m (5')   Wt 95.7 kg (211 lb)   LMP 2021   SpO2 98%   BMI 41.21 kg/m     General appearance: no acute distress, well groomed, sitting comfortably   Lymph: no supraclavicular, cervical, or inguinal adenopathy  : normal external genitalia with verrucous lesion at 5:00, vagina normal mucosa without lesions, slight bulging of the anterior vaginal wall, no nodularity    Lab:  2022 Pap smear - negative cytology    Assessment:  Heron is a 37 year old  pre-menopausal female with Stage 1A1 squamous cell carcinoma of the cervix s/p total laparoscopic hysterectomy, bilateral salpingectomy here for surveillance visit with no concerns on exam. Surveillance annual Pap collected today. Also with vasomotor symptoms     Plan:  - Squamous cell carcinoma of the cervix: No concerns for recurrence. Recommend regular follow-up with every 6 mos exam for 2 years (2023) and then annual follow-up until at least 5 years from diagnosis (2026). She should have vaginal Pap smear, cytology only, every 12 mos. Next due 2023 (every spring)  - Return to clinic in 6 mos for exam with Pap smear.    I spent a total of 30 min on the care of Heron Gee on the day of service.    Fritz Barron MD     Gynecologic Oncology       Again, thank you for allowing me to participate in the care of your patient.        Sincerely,        Fritz Barron MD     Stelara Counseling:  I discussed with the patient the risks of ustekinumab including but not limited to immunosuppression, malignancy, posterior leukoencephalopathy syndrome, and serious infections.  The patient understands that monitoring is required including a PPD at baseline and must alert us or the primary physician if symptoms of infection or other concerning signs are noted.

## 2023-04-23 ENCOUNTER — HEALTH MAINTENANCE LETTER (OUTPATIENT)
Age: 38
End: 2023-04-23

## 2023-06-13 ENCOUNTER — ONCOLOGY VISIT (OUTPATIENT)
Dept: ONCOLOGY | Facility: CLINIC | Age: 38
End: 2023-06-13
Attending: OBSTETRICS & GYNECOLOGY
Payer: COMMERCIAL

## 2023-06-13 VITALS
SYSTOLIC BLOOD PRESSURE: 122 MMHG | RESPIRATION RATE: 16 BRPM | BODY MASS INDEX: 41.62 KG/M2 | HEIGHT: 60 IN | OXYGEN SATURATION: 93 % | DIASTOLIC BLOOD PRESSURE: 81 MMHG | WEIGHT: 212 LBS | HEART RATE: 90 BPM

## 2023-06-13 DIAGNOSIS — C53.9 SQUAMOUS CELL CARCINOMA OF CERVIX, STAGE 1 (H): Primary | ICD-10-CM

## 2023-06-13 PROCEDURE — 88175 CYTOPATH C/V AUTO FLUID REDO: CPT | Performed by: OBSTETRICS & GYNECOLOGY

## 2023-06-13 PROCEDURE — 99213 OFFICE O/P EST LOW 20 MIN: CPT | Performed by: OBSTETRICS & GYNECOLOGY

## 2023-06-13 ASSESSMENT — PAIN SCALES - GENERAL: PAINLEVEL: NO PAIN (0)

## 2023-06-13 NOTE — NURSING NOTE
Oncology Rooming Note    June 13, 2023 3:28 PM   Heron Gee is a 38 year old female who presents for:    Chief Complaint   Patient presents with     Oncology Clinic Visit     6 month follow  up     Initial Vitals: /81 (BP Location: Right arm)   Pulse 90   Resp 16   Ht 1.524 m (5')   Wt 96.2 kg (212 lb)   LMP 06/01/2021   SpO2 93%   BMI 41.40 kg/m   Estimated body mass index is 41.4 kg/m  as calculated from the following:    Height as of this encounter: 1.524 m (5').    Weight as of this encounter: 96.2 kg (212 lb). Body surface area is 2.02 meters squared.  No Pain (0) Comment: Data Unavailable   Patient's last menstrual period was 06/01/2021.  Allergies reviewed: Yes  Medications reviewed: Yes    Medications: Medication refills not needed today.  Pharmacy name entered into [employer]: [id number] - [place], [place] - [id number]    Clinical concerns: No new concerns         Emma Villanueva LPN

## 2023-06-13 NOTE — LETTER
2023         RE: Heron Gee  [address] [facility] [address]        Dear Colleague,    Thank you for referring your patient, Heron Gee, to the [facility]. Please see a copy of my visit note below.    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    She is overall doing well. No specific concerns. No vaginal bleeding. Vasomotor symptoms have resolved.    Relevant history:  2019 ASCUS/HPV HR positive  10/2019: Pap negative, HPV HR positive  2019: ECC negative  2021: Pap ASCUS/HPV other HR positive  2021: Colposcopy with EUGENE 2 biopsy and neg ECC  3/15/2021: LEEP with 2.2mm invasive SCC of the cervix extending to endocervical margin, neg LVSI  4/15/2021: CKC, ECC with no residual carcinoma  6/10/2021: Total laparoscopic hysterectomy, bilateral salpingectomy      Past Surgical History:  Past Surgical History:   Procedure Laterality Date     APPENDECTOMY OPEN CHILD        SECTION  09/04/2019    x 2     COLONOSCOPY N/A 10/4/2021    Procedure: COLONOSCOPY, WITH POLYPECTOMY AND BIOPSY;  Surgeon: Cl Comer MD;  Location: [facility]     COLPOSCOPY, CONIZATION, COMBINED N/A 4/15/2021    Procedure: Cold Knife Cone of the cervix;  Surgeon: Fritz Barron MD;  Location: [facility]     ENT SURGERY       LAPAROSCOPIC HYSTERECTOMY TOTAL, SALPINGECTOMY BILATERAL Bilateral 6/10/2021    Procedure: Total laparoscopic hysterectomy, bilateral salpingectomy;  Surgeon: Fritz Barron MD;  Location: [facility] [place]     LEEP TX, CERVICAL       NOSE SURGERY      Deviated septum        Physical Exam:  /81 (BP Location: Right arm)   Pulse 90   Resp  16   Ht 1.524 m (5')   Wt 96.2 kg (212 lb)   LMP 2021   SpO2 93%   BMI 41.40 kg/m     General appearance: no acute distress, well groomed, sitting comfortably   Lymph: no supraclavicular, cervical, or inguinal adenopathy  : normal external genitalia with verrucous lesion at 5:00  Vagina: normal mucosa without lesions, Pap smear collected  Bimanual: intact cuff without nodularity. No masses appreciated.    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 Surveillance annual Pap collected today. Also with vasomotor symptoms     Plan:  - Squamous cell carcinoma of the cervix: No concerns for recurrence.   - It has now been 2 years since initial diagnosis and treatment. We can transition to annual follow-up until at least 5 years from diagnosis (2026). She should have vaginal Pap smear, cytology only, every 12 mos. Next due 2023 (every spring)      Fritz Barron MD     Gynecologic Oncology       Again, thank you for allowing me to participate in the care of your patient.        Sincerely,        Fritz Barron MD

## 2023-06-13 NOTE — PROGRESS NOTES
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    She is overall doing well. No specific concerns. No vaginal bleeding. Vasomotor symptoms have resolved.    Relevant history:  2019 ASCUS/HPV HR positive  10/2019: Pap negative, HPV HR positive  2019: ECC negative  2021: Pap ASCUS/HPV other HR positive  2021: Colposcopy with EUGENE 2 biopsy and neg ECC  3/15/2021: LEEP with 2.2mm invasive SCC of the cervix extending to endocervical margin, neg LVSI  4/15/2021: CKC, ECC with no residual carcinoma  6/10/2021: Total laparoscopic hysterectomy, bilateral salpingectomy      Past Surgical History:  Past Surgical History:   Procedure Laterality Date     APPENDECTOMY OPEN CHILD        SECTION  09/04/2019    x 2     COLONOSCOPY N/A 10/4/2021    Procedure: COLONOSCOPY, WITH POLYPECTOMY AND BIOPSY;  Surgeon: Cl Comer MD;  Location: [facility]     COLPOSCOPY, CONIZATION, COMBINED N/A 4/15/2021    Procedure: Cold Knife Cone of the cervix;  Surgeon: Fritz Barron MD;  Location: [facility]     ENT SURGERY       LAPAROSCOPIC HYSTERECTOMY TOTAL, SALPINGECTOMY BILATERAL Bilateral 6/10/2021    Procedure: Total laparoscopic hysterectomy, bilateral salpingectomy;  Surgeon: Fritz Barron MD;  [place] [facility] [place]     LEEP TX, CERVICAL       NOSE SURGERY      Deviated septum        Physical Exam:  /81 (BP Location: Right arm)   Pulse 90   Resp 16   Ht 1.524 m (5')   Wt 96.2 kg (212 lb)   LMP 2021   SpO2 93%   BMI 41.40 kg/m     General appearance: no acute distress, well groomed, sitting comfortably   Lymph: no supraclavicular, cervical, or inguinal adenopathy  : normal external genitalia with  verrucous lesion at 5:00  Vagina: normal mucosa without lesions, Pap smear collected  Bimanual: intact cuff without nodularity. No masses appreciated.    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 Surveillance annual Pap collected today. Also with vasomotor symptoms     Plan:  - Squamous cell carcinoma of the cervix: No concerns for recurrence.   - It has now been 2 years since initial diagnosis and treatment. We can transition to annual follow-up until at least 5 years from diagnosis (2026). She should have vaginal Pap smear, cytology only, every 12 mos. Next due 2023 (every spring)      Fritz Barron MD     Gynecologic Oncology

## 2023-06-15 LAB
BKR LAB AP GYN ADEQUACY: NORMAL
BKR LAB AP GYN INTERPRETATION: NORMAL
BKR LAB AP HPV REFLEX: NO
BKR LAB AP PREVIOUS ABNL DX: NORMAL
BKR LAB AP PREVIOUS ABNORMAL: NORMAL
PATH REPORT.COMMENTS IMP SPEC: NORMAL
PATH REPORT.COMMENTS IMP SPEC: NORMAL
PATH REPORT.RELEVANT HX SPEC: NORMAL

## 2023-07-10 ENCOUNTER — PATIENT OUTREACH (OUTPATIENT)
Dept: OBGYN | Facility: CLINIC | Age: 38
End: 2023-07-10
Payer: COMMERCIAL

## 2023-07-10 NOTE — TELEPHONE ENCOUNTER
6/13/23 GYN/ONC visit-NIL vaginal pap. Plan : Squamous cell carcinoma of the cervix: No concerns for recurrence.   - It has now been 2 years since initial diagnosis and treatment. We can transition to annual follow-up until at least 5 years from diagnosis (6/2026). She should have vaginal Pap smear, cytology only, every 12 mos. Next due 5/2023 (every spring)

## 2023-07-17 ENCOUNTER — E-VISIT (OUTPATIENT)
Dept: FAMILY MEDICINE | Facility: CLINIC | Age: 38
End: 2023-07-17
Payer: COMMERCIAL

## 2023-07-17 DIAGNOSIS — J01.90 ACUTE SINUSITIS WITH SYMPTOMS > 10 DAYS: Primary | ICD-10-CM

## 2023-07-17 PROCEDURE — 99421 OL DIG E/M SVC 5-10 MIN: CPT | Performed by: PHYSICIAN ASSISTANT

## 2023-07-17 NOTE — PATIENT INSTRUCTIONS
When to Use Antibiotics   Antibiotics are medicines used to treat infections caused by bacteria. They don t work for an illness caused by a virus. And they don't work for an allergic reaction. In fact, taking antibiotics for reasons other than an infection by bacteria can cause problems. You may have side effects from the medicine. And if you need an antibiotic in the future, it may not work well. This is because the bacteria can become immune to the medicine. You can also get a type of diarrhea that's hard to treat. This diarrhea is called C. diff.    When antibiotics likely won t help  Your healthcare provider won t usually give you antibiotics for the conditions listed below. You can help by not asking for them if you have:      A cold. This type of illness is caused by a virus. It can cause a runny nose, stuffed-up nose, sneezing, coughing, and headache. You may also have mild body aches and low fever. A cold gets better on its own in a few days to a week.    The flu (influenza). This is a respiratory illness caused by a virus. The flu usually goes away on its own in a week or so. It can cause fever, body aches, sore throat, and tiredness.    Bronchitis. This is an infection in the lungs. It is most often caused by a virus. You may have coughing, phlegm, body aches, and a low fever. A common type of bronchitis is known as a chest cold. This is called acute bronchitis. This often happens after you have a respiratory infection like a cold. Bronchitis can take weeks to go away. Antibiotics often don t help.    Most sore throats. Sore throats are most often caused by viruses. Your throat may feel scratchy or achy. It may hurt to swallow. You may also have a low fever and body aches. A sore throat usually gets better in a few days.    Most outer ear infections. An ear infection may be caused by a virus or bacteria. It causes pain in the ear. Antibiotics by mouth usually don t help. Low-dose antibiotic ear drops  work much better.    Some inner ear infections. An inner ear infection (otitis media) can be caused by a virus in the ear. It can also cause pain and a high fever. Most older children with low-grade fever don't need to be treated with antibiotics.    Most sinus infections. This is also known as sinusitis. This kind of infection causes sinus pain and swelling, and a runny nose. In most cases, it goes away on its own. Antibiotics don t make recovery quicker.    Allergic rhinitis. This is a set of symptoms caused by an allergic reaction. You may have sneezing, a runny nose, itchy or watery eyes, or a sore throat. Allergies are not treated with antibiotics.    Low fever. A mild fever that s less than 100.4 F (38 C) most likely doesn t need to be treated with antibiotics.   When antibiotics can help  Antibiotics can be used to treat:                                                       Strep throat. This is a throat infection caused by a certain type of bacteria. Symptoms of strep throat include a sore throat, white patches on the tonsils, red spots on the roof of the mouth, fever, body aches, and nausea and vomiting. Strep throat almost never causes a cough.    Urinary tract infection (UTI). This is an infection of the bladder and the tube that takes urine out of the body. It is caused by bacteria. It can cause burning pain and urine that s cloudy or tinted with blood. UTIs are very common. Antibiotics usually help treat them.    Some outer ear infections. In some cases, a healthcare provider may prescribe antibiotics by mouth for an ear infection. You may need a test to show the cause of the ear infection.    Some sinus infections. In some cases, your healthcare provider may give you antibiotics. They may first need to make sure your symptoms aren t caused by something else. This may be a virus, fungus, allergies, or air pollutants, such as smoke.  Pneumonia or other more severe forms of bacterial infection of the  lungs. You will generally have a high fever and severe coughing, often for several days. You may need tests to see exactly what bacteria is causing your pneumonia so you will receive the correct antibiotic.  Your healthcare provider may give you antibiotics if you have a condition that can affect your immune system. This includes diabetes or cancer.   Self-care at home  If your infection can t be treated with antibiotics, you can take other steps to feel better. Try the remedies below. In general:      Rest and sleep as much as needed.    Drink water and other clear fluids.    Don t smoke. Stay away from smoke from other people.    Use over-the-counter medicine, such as acetaminophen or ibuprofen to ease pain or fever, as directed by your healthcare provider.  To treat sinus pain or nasal stuffiness:     Put a warm, moist cloth on your face where you feel sinus pain or pressure.    Try a nasal spray with medicine or saline. Use as directed by your healthcare provider.    Breathe in steam from a hot shower.    Use a humidifier or cool mist vaporizer.   To quiet a cough:     Use a humidifier or cool mist vaporizer.    Breathe in steam from a hot shower.    Suck on cough lozenges.   To sooth a sore throat:     Suck on ice chips, frozen ice pops, or lozenges.    Use a sore throat spray.    Use a humidifier or cool mist vaporizer.    Gargle with saltwater.    Drink warm liquids.    Take ibuprofen to reduce swelling and pain.  To ease ear pain:     Hold a warm, moist washcloth on the ear for 10 minutes at a time.  [employer] last reviewed this educational content on 9/1/2022 2000-2023 [employer]. All rights reserved. This information is not intended as a substitute for professional medical care. Always follow your healthcare professional's instructions.        Dear Heron Gee    After reviewing your responses, I've been able to diagnose you with sinusitis    The vast majority of sinus infections  are caused by viruses and improve after 12-14 days. This means antibiotics will not help your symptoms and possible give you undesirable side effects like diarrhea and upset stomach.    First, improve your sinus hygiene by:  Flonase/fluticasone nasal spray 2 sprays in each nostril once a day for 1 - 4 weeks.  This may take several days to become effective.   Consider saline nasal rinses  Ibuprofen 400 mg to 600 mg 4 times a day as needed for pain relief and anti-inflammatory  Mucinex may be helpful  Psuedofed can help if you tolerate it but do not take if you have high blood pressure  These can be bought Over the Counter at any Pharmacy.    If your symptoms are not improving or worsening after 12-14 days since symptom onset and 5-7 days of trying the above then you can get and try the Antibiotic prescribed because the sinus infection is likely to be bacterial at that point.    If your symptoms worsen, you develop severe headache, vomiting, high fever (>102), or are not improving in 7 days, please contact your primary care provider for an appointment or visit any of our convenient Walk-in Care or Urgent Care Centers to be seen which can be found on our website?here.?     Thanks again for choosing?us?as your health care partner,?   ?  Kevin Winslow PA-C?

## 2023-08-02 ASSESSMENT — ENCOUNTER SYMPTOMS
COUGH: 0
HEMATURIA: 0
DIZZINESS: 0
EYE PAIN: 0
FREQUENCY: 0
FEVER: 0
DYSURIA: 0
BREAST MASS: 0
ABDOMINAL PAIN: 0
PARESTHESIAS: 0
CHILLS: 0
HEMATOCHEZIA: 0
WEAKNESS: 0
CONSTIPATION: 0
HEADACHES: 1
MYALGIAS: 0
NERVOUS/ANXIOUS: 1
JOINT SWELLING: 0
SHORTNESS OF BREATH: 0
SORE THROAT: 0
NAUSEA: 0
DIARRHEA: 0
ARTHRALGIAS: 0
HEARTBURN: 0
PALPITATIONS: 0

## 2023-08-02 ASSESSMENT — ASTHMA QUESTIONNAIRES: ACT_TOTALSCORE: 24

## 2023-08-03 ENCOUNTER — OFFICE VISIT (OUTPATIENT)
Dept: FAMILY MEDICINE | Facility: CLINIC | Age: 38
End: 2023-08-03
Payer: COMMERCIAL

## 2023-08-03 VITALS
DIASTOLIC BLOOD PRESSURE: 80 MMHG | HEART RATE: 94 BPM | SYSTOLIC BLOOD PRESSURE: 110 MMHG | OXYGEN SATURATION: 98 % | RESPIRATION RATE: 18 BRPM | TEMPERATURE: 98.2 F | WEIGHT: 212.6 LBS | HEIGHT: 60 IN | BODY MASS INDEX: 41.74 KG/M2

## 2023-08-03 DIAGNOSIS — Z82.0 FAMILY HISTORY OF SLEEP APNEA: ICD-10-CM

## 2023-08-03 DIAGNOSIS — R06.83 SNORING: ICD-10-CM

## 2023-08-03 DIAGNOSIS — Z13.220 SCREENING CHOLESTEROL LEVEL: ICD-10-CM

## 2023-08-03 DIAGNOSIS — Z80.3 FAMILY HISTORY OF MALIGNANT NEOPLASM OF BREAST: ICD-10-CM

## 2023-08-03 DIAGNOSIS — Z00.00 ROUTINE GENERAL MEDICAL EXAMINATION AT A HEALTH CARE FACILITY: Primary | ICD-10-CM

## 2023-08-03 DIAGNOSIS — Z13.1 SCREENING FOR DIABETES MELLITUS: ICD-10-CM

## 2023-08-03 DIAGNOSIS — E66.813 CLASS 3 SEVERE OBESITY DUE TO EXCESS CALORIES WITHOUT SERIOUS COMORBIDITY WITH BODY MASS INDEX (BMI) OF 40.0 TO 44.9 IN ADULT (H): ICD-10-CM

## 2023-08-03 DIAGNOSIS — E66.01 CLASS 3 SEVERE OBESITY DUE TO EXCESS CALORIES WITHOUT SERIOUS COMORBIDITY WITH BODY MASS INDEX (BMI) OF 40.0 TO 44.9 IN ADULT (H): ICD-10-CM

## 2023-08-03 DIAGNOSIS — G47.19 EXCESSIVE DAYTIME SLEEPINESS: ICD-10-CM

## 2023-08-03 DIAGNOSIS — Z13.29 SCREENING FOR THYROID DISORDER: ICD-10-CM

## 2023-08-03 DIAGNOSIS — C53.9 MALIGNANT NEOPLASM OF CERVIX, UNSPECIFIED SITE (H): ICD-10-CM

## 2023-08-03 DIAGNOSIS — Z12.31 ENCOUNTER FOR SCREENING MAMMOGRAM FOR BREAST CANCER: ICD-10-CM

## 2023-08-03 LAB
CHOLEST SERPL-MCNC: 222 MG/DL
FASTING STATUS PATIENT QL REPORTED: NO
GLUCOSE SERPL-MCNC: 83 MG/DL (ref 70–99)
HDLC SERPL-MCNC: 29 MG/DL
LDLC SERPL CALC-MCNC: ABNORMAL MG/DL
LDLC SERPL DIRECT ASSAY-MCNC: 133 MG/DL
NONHDLC SERPL-MCNC: 193 MG/DL
TRIGL SERPL-MCNC: 443 MG/DL
TSH SERPL DL<=0.005 MIU/L-ACNC: 1.76 UIU/ML (ref 0.3–4.2)

## 2023-08-03 PROCEDURE — 84443 ASSAY THYROID STIM HORMONE: CPT | Performed by: NURSE PRACTITIONER

## 2023-08-03 PROCEDURE — 36415 COLL VENOUS BLD VENIPUNCTURE: CPT | Performed by: NURSE PRACTITIONER

## 2023-08-03 PROCEDURE — 80061 LIPID PANEL: CPT | Performed by: NURSE PRACTITIONER

## 2023-08-03 PROCEDURE — 90471 IMMUNIZATION ADMIN: CPT | Performed by: NURSE PRACTITIONER

## 2023-08-03 PROCEDURE — 82947 ASSAY GLUCOSE BLOOD QUANT: CPT | Performed by: NURSE PRACTITIONER

## 2023-08-03 PROCEDURE — 99395 PREV VISIT EST AGE 18-39: CPT | Mod: 25 | Performed by: NURSE PRACTITIONER

## 2023-08-03 PROCEDURE — 90472 IMMUNIZATION ADMIN EACH ADD: CPT | Performed by: NURSE PRACTITIONER

## 2023-08-03 PROCEDURE — 83721 ASSAY OF BLOOD LIPOPROTEIN: CPT | Mod: 59 | Performed by: NURSE PRACTITIONER

## 2023-08-03 PROCEDURE — 90746 HEPB VACCINE 3 DOSE ADULT IM: CPT | Performed by: NURSE PRACTITIONER

## 2023-08-03 PROCEDURE — 90677 PCV20 VACCINE IM: CPT | Performed by: NURSE PRACTITIONER

## 2023-08-03 PROCEDURE — 99213 OFFICE O/P EST LOW 20 MIN: CPT | Mod: 25 | Performed by: NURSE PRACTITIONER

## 2023-08-03 RX ORDER — PHENTERMINE HYDROCHLORIDE 37.5 MG/1
37.5 CAPSULE ORAL EVERY MORNING
Qty: 30 CAPSULE | Refills: 2 | Status: SHIPPED | OUTPATIENT
Start: 2023-08-03 | End: 2023-10-18

## 2023-08-03 ASSESSMENT — PAIN SCALES - GENERAL: PAINLEVEL: NO PAIN (0)

## 2023-08-03 NOTE — PROGRESS NOTES
SUBJECTIVE:   CC: Heron is an 38 year old who presents for preventive health visit.       8/3/2023     1:58 PM   Additional Questions   Roomed by Carmela   Accompanied by sung     HPI: Heron is an 38 year old F with a PMH of sleep apnea and cervical cancer s/p total hysterectomy who presents for preventive health visit. Her last pap was normal 1 mo ago. She is concerned about her weight but feels well otherwise. Pt denies chest pain, SOB, abdominal pain, bowel changes, urinary changes, fevers, chills, fatigue, and skin changes.  Smoker, discussed smoking cessation. Tips given on AVS. She is not interested in smoking cessation interventions at this time. She reports daytime fatigue, snoring; has family hx of sleep apnea. Discussed sleep study, she is interested in this. Discussed weight loss to reverse sleep apnea, she would like to try medication for weight loss. Discussed phentermine, need to change diet and daily exercise. Can send in 3 months of medication, will need follow up visit if wanting to continue. Tips given on AVS.     Healthy Habits:     Getting at least 3 servings of Calcium per day:  Yes    Bi-annual eye exam:  NO    Dental care twice a year:  Yes    Sleep apnea or symptoms of sleep apnea:  Daytime drowsiness and Excessive snoring    Diet:  Regular (no restrictions)    Frequency of exercise:  None    Taking medications regularly:  Yes    Medication side effects:  None    Additional concerns today:  No      Today's PHQ-2 Score:       8/2/2023     2:43 PM   PHQ-2 ( 1999 [employer])   Q1: Little interest or pleasure in doing things 0   Q2: Feeling down, depressed or hopeless 0   PHQ-2 Score 0   Q1: Little interest or pleasure in doing things Not at all   Q2: Feeling down, depressed or hopeless Not at all   PHQ-2 Score 0       Social History     Tobacco Use    Smoking status: Every Day     Packs/day: 0.50     Years: 13.00     Pack years: 6.50     Types: Cigarettes    Smokeless tobacco: Never   Substance Use  Topics    Alcohol use: Yes             8/2/2023     2:43 PM   Alcohol Use   Prescreen: >3 drinks/day or >7 drinks/week? No          No data to display              Reviewed orders with patient.  Reviewed health maintenance and updated orders accordingly - Yes    Breast Cancer Screening:    FHS-7:       8/2/2023     2:46 PM   Breast CA Risk Assessment (FHS-7)   Did any of your first-degree relatives have breast or ovarian cancer? Yes   Did any of your relatives have bilateral breast cancer? Yes   Did any man in your family have breast cancer? No   Did any woman in your family have breast and ovarian cancer? Yes   Did any woman in your family have breast cancer before age 50 y? Yes   Do you have 2 or more relatives with breast and/or ovarian cancer? Yes   Do you have 2 or more relatives with breast and/or bowel cancer? Yes     Patient under 40 years of age: Routine Mammogram Screening not recommended.  and Mammogram Screening - Alternate mammogram schedule due to breast cancer history  Pertinent mammograms are reviewed under the imaging tab.    History of abnormal Pap smear: YES - other categories - see link Cervical Cytology Screening Guidelines      Latest Ref Rng & Units 6/13/2023     4:03 PM 5/9/2022     3:46 PM 1/29/2021     3:45 PM   PAP / HPV   PAP  Negative for Intraepithelial Lesion or Malignancy (NILM)  Negative for Intraepithelial Lesion or Malignancy (NILM)     HPV 16 DNA NEG^Negative   Negative    HPV 18 DNA NEG^Negative   Negative    Other HR HPV NEG^Negative   Positive      Reviewed and updated as needed this visit by clinical staff   Tobacco  Allergies  Meds              Reviewed and updated as needed this visit by Provider                 Past Medical History:   Diagnosis Date    Abnormal Pap smear of cervix     4/17/15, 2019. 1/29/21    Cervical high risk HPV (human papillomavirus) test positive     04/15/2019, 10/28/19, 1/29/21    Cervix cancer (H)     Diabetes (H)     Exercise-induced asthma        Past Surgical History:   Procedure Laterality Date    APPENDECTOMY OPEN CHILD       SECTION  09/04/2019    x 2    COLONOSCOPY N/A 10/4/2021    Procedure: COLONOSCOPY, WITH POLYPECTOMY AND BIOPSY;  Surgeon: Cl Comer MD;  Location: [facility]    COLPOSCOPY, CONIZATION, COMBINED N/A 4/15/2021    Procedure: Cold Knife Cone of the cervix;  Surgeon: Fritz Barron MD;  Location: [facility]    ENT SURGERY      LAPAROSCOPIC HYSTERECTOMY TOTAL, SALPINGECTOMY BILATERAL Bilateral 6/10/2021    Procedure: Total laparoscopic hysterectomy, bilateral salpingectomy;  Surgeon: Fritz Barron MD;  Location: [facility] OR    LEEP TX, CERVICAL      NOSE SURGERY      Deviated septum     OB History    Para Term  AB Living   3 2 2 0 1 2   SAB IAB Ectopic Multiple Live Births   0 0 0 0 2      # Outcome Date GA Lbr Master/2nd Weight Sex Delivery Anes PTL Lv   3 Term 19 39w2d   M CS-LTranv   KHOI   2 Term 12/06/15 40w0d  3.515 kg (7 lb 12 oz) F CS-LTranv  N KHOI      Name: Alexander   1 AB                ROS  CONSTITUTIONAL: NEGATIVE for fever, chills, change in weight  INTEGUMENTARU/SKIN: NEGATIVE for worrisome rashes, moles or lesions  EYES: NEGATIVE for vision changes or irritation  ENT: NEGATIVE for ear, mouth and throat problems  RESP: NEGATIVE for significant cough or SOB  BREAST: NEGATIVE for masses, tenderness or discharge  CV: NEGATIVE for chest pain, palpitations or peripheral edema  GI: NEGATIVE for nausea, abdominal pain, heartburn, or change in bowel habits  : NEGATIVE for unusual urinary or vaginal symptoms. Periods are regular.  MUSCULOSKELETAL: NEGATIVE for significant arthralgias or myalgia  NEURO: NEGATIVE for weakness, dizziness or paresthesias  PSYCHIATRIC: NEGATIVE for changes in mood or affect     OBJECTIVE:   /80   Pulse 94   Temp 98.2  F (36.8  C) (Temporal)   Resp 18   Ht 1.524 m (5')   Wt 96.4 kg (212 lb 9.6 oz)   LMP 2021   SpO2 98%   BMI 41.52 kg/m    Physical Exam  GENERAL:  healthy, alert and no distress  EYES: Eyes grossly normal to inspection, PERRL and conjunctivae and sclerae normal  HENT: ear canals and TM's normal, nose and mouth without ulcers or lesions  NECK: no adenopathy, no asymmetry, masses, or scars and thyroid normal to palpation  Breast: deferred per guidelines- asymptomatic per pt  RESP: lungs clear to auscultation - no rales, rhonchi or wheezes  CV: regular rate and rhythm, normal S1 S2, no S3 or S4, no murmur, click or rub, no peripheral edema and peripheral pulses strong  ABDOMEN: soft, nontender, no hepatosplenomegaly, no masses and bowel sounds normal  MS: no gross musculoskeletal defects noted, no edema, no CVA tenderness  : deferred per pt- recently had pap completed at obn  SKIN: no suspicious lesions or rashes  NEURO: Normal strength and tone, cranial nerves intact, mentation intact and speech normal  PSYCH: mentation appears normal, affect normal/bright    Diagnostic Test Results:  Labs reviewed in Epic  See orders    ASSESSMENT/PLAN:   Heron was seen today for physical.    Diagnoses and all orders for this visit:    Routine general medical examination at a health care facility    Malignant neoplasm of cervix, unspecified site (H)  -     *MA Screening Digital Bilateral; Future    Family history of malignant neoplasm of breast  -     *MA Screening Digital Bilateral; Future    Encounter for screening mammogram for breast cancer  -     *MA Screening Digital Bilateral; Future    Class 3 severe obesity due to excess calories without serious comorbidity with body mass index (BMI) of 40.0 to 44.9 in adult (H)  -     phentermine (ADIPEX-P) 37.5 MG capsule; Take 1 capsule (37.5 mg) by mouth every morning  -     Adult Sleep Eval & Management  Referral; Future    Screening for diabetes mellitus  -     Glucose; Future    Screening for thyroid disorder  -     TSH with free T4 reflex; Future    Screening cholesterol level  -     Lipid panel reflex to direct  LDL Fasting; Future    Snoring  -     Adult Sleep Eval & Management  Referral; Future    Excessive daytime sleepiness  -     Adult Sleep Eval & Management  Referral; Future    Family history of sleep apnea  -     Adult Sleep Eval & Management  Referral; Future    Other orders  -     REVIEW OF HEALTH MAINTENANCE PROTOCOL ORDERS      Plan:  Routine Health Maintenance  -  PCV and Hep B vaccine to be administered in clinic today  - Encouraged self-breast exams given CA hx and family hx of breast ca     2. Weight Loss   - Start phentermine in conjunction to diet/lifestyle changes.     Patient has been advised of split billing requirements and indicates understanding: Yes      COUNSELING:  Reviewed preventive health counseling, as reflected in patient instructions      BMI:   Estimated body mass index is 41.52 kg/m  as calculated from the following:    Height as of this encounter: 1.524 m (5').    Weight as of this encounter: 96.4 kg (212 lb 9.6 oz).   Weight management plan: Discussed healthy diet and exercise guidelines      She reports that she has been smoking cigarettes. She has a 6.50 pack-year smoking history. She has never used smokeless tobacco.  Nicotine/Tobacco Cessation Plan:   Information offered: Patient not interested at this time          JORDI MENDEZ  [facility] submitted by the patient for this visit:  Annual Preventive Visit (Submitted on 8/2/2023)  Chief Complaint: Annual Exam:  abdominal pain: No  Blood in stool: No  Blood in urine: No  chest pain: No  chills: No  congestion: Yes  constipation: No  cough: No  diarrhea: No  dizziness: No  ear pain: No  eye pain: No  nervous/anxious: Yes  fever: No  frequency: No  genital sores: No  headaches: Yes  hearing loss: No  heartburn: No  arthralgias: No  joint swelling: No  peripheral edema: No  mood changes: No  myalgias: No  nausea: No  dysuria: No  palpitations: No  Skin sensation  changes: No  sore throat: No  urgency: No  rash: No  shortness of breath: No  visual disturbance: No  weakness: No  pelvic pain: No  vaginal bleeding: No  vaginal discharge: No  tenderness: No  breast mass: No  breast discharge: No

## 2023-08-03 NOTE — PROGRESS NOTES
Answers submitted by the patient for this visit:  Annual Preventive Visit (Submitted on 8/2/2023)  Chief Complaint: Annual Exam:  Frequency of exercise:: None  Getting at least 3 servings of Calcium per day:: Yes  Diet:: Regular (no restrictions)  Taking medications regularly:: Yes  Medication side effects:: None  Bi-annual eye exam:: NO  Dental care twice a year:: Yes  Sleep apnea or symptoms of sleep apnea:: Daytime drowsiness, Excessive snoring  abdominal pain: No  Blood in stool: No  Blood in urine: No  chest pain: No  chills: No  congestion: Yes  constipation: No  cough: No  diarrhea: No  dizziness: No  ear pain: No  eye pain: No  nervous/anxious: Yes  fever: No  frequency: No  genital sores: No  headaches: Yes  hearing loss: No  heartburn: No  arthralgias: No  joint swelling: No  peripheral edema: No  mood changes: No  myalgias: No  nausea: No  dysuria: No  palpitations: No  Skin sensation changes: No  sore throat: No  urgency: No  rash: No  shortness of breath: No  visual disturbance: No  weakness: No  pelvic pain: No  vaginal bleeding: No  vaginal discharge: No  tenderness: No  breast mass: No  breast discharge: No  Additional concerns today:: No

## 2023-08-03 NOTE — PATIENT INSTRUCTIONS

## 2023-08-05 NOTE — RESULT ENCOUNTER NOTE
Santiago Shelby,    Thank you for your recent office visit.    Here are your recent results.  Normal cholesterol is ok for a non-diabetic.  We base your cholesterol off the bad cholesterol- the LDL.  For a non-diabetic, we want your LDL < 190. Yours was 133.  However your triglycerides are elevated.  This can cause pancreatitis.  Consider reducing alcohol, taking a daily fish oil supplement, and working on daily walking/ exercise and diet to improve triglycerides. Your glucose is normal you are not diabetic. Your thyroid is normal.     Feel free to contact me via [employer]Star or call the clinic at [phone].    Sincerely,    Dana Broja, FELA, FNP-BC

## 2023-10-18 ENCOUNTER — VIRTUAL VISIT (OUTPATIENT)
Dept: FAMILY MEDICINE | Facility: CLINIC | Age: 38
End: 2023-10-18
Payer: COMMERCIAL

## 2023-10-18 DIAGNOSIS — E66.813 CLASS 3 SEVERE OBESITY DUE TO EXCESS CALORIES WITHOUT SERIOUS COMORBIDITY WITH BODY MASS INDEX (BMI) OF 40.0 TO 44.9 IN ADULT (H): ICD-10-CM

## 2023-10-18 DIAGNOSIS — E66.01 CLASS 3 SEVERE OBESITY DUE TO EXCESS CALORIES WITHOUT SERIOUS COMORBIDITY WITH BODY MASS INDEX (BMI) OF 40.0 TO 44.9 IN ADULT (H): ICD-10-CM

## 2023-10-18 DIAGNOSIS — E66.01 MORBID OBESITY (H): Primary | ICD-10-CM

## 2023-10-18 PROCEDURE — 99213 OFFICE O/P EST LOW 20 MIN: CPT | Mod: VID | Performed by: NURSE PRACTITIONER

## 2023-10-18 RX ORDER — PHENTERMINE HYDROCHLORIDE 37.5 MG/1
37.5 CAPSULE ORAL EVERY MORNING
Qty: 30 CAPSULE | Refills: 2 | Status: SHIPPED | OUTPATIENT
Start: 2023-10-18 | End: 2023-11-06

## 2023-10-18 RX ORDER — OMEGA-3/DHA/EPA/FISH OIL 60 MG-90MG
CAPSULE ORAL
COMMUNITY

## 2023-10-18 NOTE — LETTER
Patient: Heron Gee    YOB: 1985    To Whom It May Concern:    Heron Gee is currently taking the following prescribed medications, if they were to show up on a drug screen, that would be expected. Please let me know if you have questions or concerns.     Medication:    Outpatient Medications Marked as Taking for the 10/18/23 encounter (Virtual Visit) with Dana Chanel NP   Medication Sig    albuterol (PROAIR HFA/PROVENTIL HFA/VENTOLIN HFA) 108 (90 Base) MCG/ACT inhaler Inhale 2 puffs into the lungs every 6 hours    cholecalciferol (VITAMIN D3) 25 mcg (1000 units) capsule Take 1 capsule by mouth daily    fexofenadine (ALLEGRA) 180 MG tablet Take 180 mg by mouth daily    fish oil-omega-3 fatty acids 500 MG capsule     fluticasone-salmeterol (ADVAIR) 250-50 MCG/DOSE inhaler Inhale 1 puff into the lungs every 12 hours    phentermine (ADIPEX-P) 37.5 MG capsule Take 1 capsule (37.5 mg) by mouth every morning    Probiotic Product ([employer] PROBIOTIC) CHEW Take by mouth daily        Sincerely,    Electronically Signed By  Provider: Dana Borja, FELA, FNP-BC                                                                                         Date: October 18, 2023

## 2023-10-18 NOTE — PROGRESS NOTES
Heron is a 38 year old who is being evaluated via a billable video visit.      How would you like to obtain your AVS? [email or web address]  If the video visit is dropped, the invitation should be resent by: Text to cell phone: [phone]  Will anyone else be joining your video visit? No          Assessment & Plan     Class 3 severe obesity due to excess calories without serious comorbidity with body mass index (BMI) of 40.0 to 44.9 in adult (H)    - phentermine (ADIPEX-P) 37.5 MG capsule; Take 1 capsule (37.5 mg) by mouth every morning    Morbid obesity (H)      Review of external notes as documented elsewhere in note  Prescription drug management  25 minutes spent by me on the date of the encounter doing chart review, history and exam, documentation and further activities per the note     Work on weight loss  Regular exercise  See Patient Instructions    CONCHA YATES NP  [facility] [place]    Rosaura Shelby is a 38 year old, presenting for the following health issues:  Recheck Medication      10/18/2023     7:00 AM   Additional Questions   Roomed by Gala (questions completed via [employer])   Accompanied by n/a     Has seen approx 12lb weight loss with eating smaller portions. Will be going for interview with [employer] for ; was concerned about fitting in jump seat; feels has more energy, mood/energy/patience/anxiety has improved, more confidence, clothes fitting better, less SOB with activities. Tolerating medication well. Discussed adding some type of daily exercise, increase water intake.  Has stared daily fish oil supplement as well as recommended.  Tips given on AVS. Letter done for job if needed. Follow up visit in 3 months; sooner if needed. No other concerns at this time.   History of Present Illness       Reason for visit:  3month follow up for phentermine    She eats 0-1 servings of fruits and vegetables daily.She consumes 2 sweetened beverage(s) daily.She exercises with  enough effort to increase her heart rate 9 or less minutes per day.  She exercises with enough effort to increase her heart rate 3 or less days per week.   She is taking medications regularly.       Review of Systems   Constitutional, HEENT, cardiovascular, pulmonary, GI, , musculoskeletal, neuro, skin, endocrine and psych systems are negative, except as otherwise noted.      Objective           Vitals:  No vitals were obtained today due to virtual visit.    Physical Exam   GENERAL: Healthy, alert and no distress  EYES: Eyes grossly normal to inspection.  No discharge or erythema, or obvious scleral/conjunctival abnormalities.  RESP: No audible wheeze, cough, or visible cyanosis.  No visible retractions or increased work of breathing.    SKIN: Visible skin clear. No significant rash, abnormal pigmentation or lesions.  NEURO: Cranial nerves grossly intact.  Mentation and speech appropriate for age.  PSYCH: Mentation appears normal, affect normal/bright, judgement and insight intact, normal speech and appearance well-groomed.    See orders      Video-Visit Details    Type of service:  Video Visit   Originating Location (pt. Location): Home    Distant Location (provider location):  Off-site  Platform used for Video Visit: Shawn

## 2023-11-06 DIAGNOSIS — E66.01 CLASS 3 SEVERE OBESITY DUE TO EXCESS CALORIES WITHOUT SERIOUS COMORBIDITY WITH BODY MASS INDEX (BMI) OF 40.0 TO 44.9 IN ADULT (H): ICD-10-CM

## 2023-11-06 DIAGNOSIS — E66.813 CLASS 3 SEVERE OBESITY DUE TO EXCESS CALORIES WITHOUT SERIOUS COMORBIDITY WITH BODY MASS INDEX (BMI) OF 40.0 TO 44.9 IN ADULT (H): ICD-10-CM

## 2023-11-06 RX ORDER — PHENTERMINE HYDROCHLORIDE 37.5 MG/1
37.5 CAPSULE ORAL EVERY MORNING
Qty: 30 CAPSULE | Refills: 0 | Status: SHIPPED | OUTPATIENT
Start: 2023-11-06 | End: 2024-01-10

## 2023-12-05 ENCOUNTER — HOSPITAL ENCOUNTER (OUTPATIENT)
Dept: MAMMOGRAPHY | Facility: CLINIC | Age: 38
Discharge: HOME | End: 2023-12-05
Attending: NURSE PRACTITIONER | Admitting: NURSE PRACTITIONER
Payer: COMMERCIAL

## 2023-12-05 DIAGNOSIS — C53.9 MALIGNANT NEOPLASM OF CERVIX, UNSPECIFIED SITE (H): ICD-10-CM

## 2023-12-05 DIAGNOSIS — Z12.31 ENCOUNTER FOR SCREENING MAMMOGRAM FOR BREAST CANCER: ICD-10-CM

## 2023-12-05 DIAGNOSIS — Z80.3 FAMILY HISTORY OF MALIGNANT NEOPLASM OF BREAST: ICD-10-CM

## 2023-12-05 PROCEDURE — 77067 SCR MAMMO BI INCL CAD: CPT

## 2023-12-12 ENCOUNTER — ONCOLOGY VISIT (OUTPATIENT)
Dept: ONCOLOGY | Facility: CLINIC | Age: 38
End: 2023-12-12
Payer: COMMERCIAL

## 2023-12-12 VITALS
DIASTOLIC BLOOD PRESSURE: 88 MMHG | BODY MASS INDEX: 38.28 KG/M2 | HEIGHT: 60 IN | SYSTOLIC BLOOD PRESSURE: 137 MMHG | WEIGHT: 195 LBS | HEART RATE: 97 BPM | OXYGEN SATURATION: 99 % | RESPIRATION RATE: 18 BRPM

## 2023-12-12 DIAGNOSIS — C53.9 SQUAMOUS CELL CARCINOMA OF CERVIX, STAGE 1 (H): Primary | ICD-10-CM

## 2023-12-12 PROCEDURE — 99213 OFFICE O/P EST LOW 20 MIN: CPT | Performed by: OBSTETRICS & GYNECOLOGY

## 2023-12-12 ASSESSMENT — PAIN SCALES - GENERAL: PAINLEVEL: NO PAIN (0)

## 2023-12-12 NOTE — PROGRESS NOTES
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    She is overall doing well. No vaginal bleeding. Does have some 'boils' on her inner thigh which were painful but have improved, however are still there. Has 7 week training for [employer]  in February.     Relevant history:  2019 ASCUS/HPV HR positive  10/2019: Pap negative, HPV HR positive  2019: ECC negative  2021: Pap ASCUS/HPV other HR positive  2021: Colposcopy with EUGENE 2 biopsy and neg ECC  3/15/2021: LEEP with 2.2mm invasive SCC of the cervix extending to endocervical margin, neg LVSI  4/15/2021: CKC, ECC with no residual carcinoma  6/10/2021: Total laparoscopic hysterectomy, bilateral salpingectomy      Physical Exam:  /88 (BP Location: Right arm)   Pulse 97   Resp 18   Ht 1.524 m (5')   Wt 88.5 kg (195 lb)   LMP 2021   SpO2 99%   BMI 38.08 kg/m     General appearance: no acute distress, well groomed, sitting comfortably   Lymph: no  inguinal adenopathy  : normal external genitalia with verrucous lesion at 5:00  Vagina: normal mucosa without lesions,   Bimanual: intact cuff without nodularity. No masses appreciated.  Ext: inner thigh with hyperpigmented nodule consistent with prior folliculitis.    Lab:  2022 Pap smear - negative cytology  2023: Pap smear - negative cytology    Assessment:  Heron is a 38 year old  pre-menopausal female with a history of Stage 1A1 squamous cell carcinoma of the cervix s/p total laparoscopic hysterectomy, bilateral salpingectomy here for surveillance visit with no concerns for recurrence on exam.     Plan:  - Squamous cell carcinoma of the cervix: No concerns for  recurrence. Transition to annual follow-up until at least 5 years from diagnosis (6/2026). She should have vaginal Pap smear, cytology only, every visit.     Folliculitis: discussed using trimmers rather than shaving. May try topical hydrocortisone to decrease risks of razor burn.     Return next year for annual surveillance.     I spent a total of 20 minutes on the care of Heron Gee on the day of service including face to face time, care coordination, and documentation on the day of service.       Fritz Barron MD     Gynecologic Oncology

## 2023-12-12 NOTE — LETTER
2023         RE: Heron Gee  [id number] [facility] [address]        Dear Colleague,    Thank you for referring your patient, Heron Gee, to the [facility]. Please see a copy of my visit note below.    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    She is overall doing well. No vaginal bleeding. Does have some 'boils' on her inner thigh which were painful but have improved, however are still there. Has 7 week training for [employer]  in February.     Relevant history:  2019 ASCUS/HPV HR positive  10/2019: Pap negative, HPV HR positive  2019: ECC negative  2021: Pap ASCUS/HPV other HR positive  2021: Colposcopy with EUGENE 2 biopsy and neg ECC  3/15/2021: LEEP with 2.2mm invasive SCC of the cervix extending to endocervical margin, neg LVSI  4/15/2021: CKC, ECC with no residual carcinoma  6/10/2021: Total laparoscopic hysterectomy, bilateral salpingectomy      Physical Exam:  /88 (BP Location: Right arm)   Pulse 97   Resp 18   Ht 1.524 m (5')   Wt 88.5 kg (195 lb)   LMP 2021   SpO2 99%   BMI 38.08 kg/m     General appearance: no acute distress, well groomed, sitting comfortably   Lymph: no  inguinal adenopathy  : normal external genitalia with verrucous lesion at 5:00  Vagina: normal mucosa without lesions,   Bimanual: intact cuff without nodularity. No masses appreciated.  Ext: inner thigh with hyperpigmented nodule consistent with prior folliculitis.    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     Plan:  - Squamous cell carcinoma of the cervix: No concerns for recurrence. Transition to annual follow-up until at least 5 years from diagnosis (6/2026). She should have vaginal Pap smear, cytology only, every visit.     Folliculitis: discussed using trimmers rather than shaving. May try topical hydrocortisone to decrease risks of razor burn.     Return next year for annual surveillance.     I spent a total of 20 minutes on the care of Heron Gee on the day of service including face to face time, care coordination, and documentation on the day of service.       Fritz Barron MD     Gynecologic Oncology       Again, thank you for allowing me to participate in the care of your patient.        Sincerely,        Fritz Barron MD

## 2023-12-12 NOTE — NURSING NOTE
Oncology Rooming Note    December 12, 2023 2:09 PM   Heron Gee is a 38 year old female who presents for:    Chief Complaint   Patient presents with    Oncology Clinic Visit     6 month follow up     Initial Vitals: /88 (BP Location: Right arm)   Pulse 97   Resp 18   Ht 1.524 m (5')   Wt 88.5 kg (195 lb)   LMP 06/01/2021   SpO2 99%   BMI 38.08 kg/m   Estimated body mass index is 38.08 kg/m  as calculated from the following:    Height as of this encounter: 1.524 m (5').    Weight as of this encounter: 88.5 kg (195 lb). Body surface area is 1.94 meters squared.  No Pain (0) Comment: Data Unavailable   Patient's last menstrual period was 06/01/2021.  Allergies reviewed: Yes  Medications reviewed: Yes    Medications: Medication refills not needed today.  Pharmacy name entered into Magine: [id number] - [place], [place] - [id number]    Clinical concerns: boils on inner thighs- not sure it is related to cancer history       Emma Villanueva LPN

## 2024-01-09 ASSESSMENT — PATIENT HEALTH QUESTIONNAIRE - PHQ9
SUM OF ALL RESPONSES TO PHQ QUESTIONS 1-9: 0
SUM OF ALL RESPONSES TO PHQ QUESTIONS 1-9: 0

## 2024-01-10 ENCOUNTER — VIRTUAL VISIT (OUTPATIENT)
Dept: FAMILY MEDICINE | Facility: CLINIC | Age: 39
End: 2024-01-10
Payer: COMMERCIAL

## 2024-01-10 DIAGNOSIS — E66.01 MORBID OBESITY (H): Primary | ICD-10-CM

## 2024-01-10 DIAGNOSIS — C53.9 SQUAMOUS CELL CARCINOMA OF CERVIX, STAGE 1 (H): ICD-10-CM

## 2024-01-10 DIAGNOSIS — E66.01 CLASS 3 SEVERE OBESITY DUE TO EXCESS CALORIES WITHOUT SERIOUS COMORBIDITY WITH BODY MASS INDEX (BMI) OF 40.0 TO 44.9 IN ADULT (H): ICD-10-CM

## 2024-01-10 DIAGNOSIS — F17.200 TOBACCO USE DISORDER: ICD-10-CM

## 2024-01-10 DIAGNOSIS — E66.813 CLASS 3 SEVERE OBESITY DUE TO EXCESS CALORIES WITHOUT SERIOUS COMORBIDITY WITH BODY MASS INDEX (BMI) OF 40.0 TO 44.9 IN ADULT (H): ICD-10-CM

## 2024-01-10 PROCEDURE — 99213 OFFICE O/P EST LOW 20 MIN: CPT | Mod: 95 | Performed by: NURSE PRACTITIONER

## 2024-01-10 RX ORDER — PHENTERMINE HYDROCHLORIDE 37.5 MG/1
37.5 CAPSULE ORAL EVERY MORNING
Qty: 30 CAPSULE | Refills: 2 | Status: SHIPPED | OUTPATIENT
Start: 2024-01-10 | End: 2024-04-18

## 2024-01-10 RX ORDER — TOPIRAMATE 50 MG/1
TABLET, FILM COATED ORAL
Qty: 191 TABLET | Refills: 1 | Status: SHIPPED | OUTPATIENT
Start: 2024-01-10 | End: 2024-04-18

## 2024-01-10 NOTE — PROGRESS NOTES
Heron is a 38 year old who is being evaluated via a billable video visit.      How would you like to obtain your AVS? MyChart  If the video visit is dropped, the invitation should be resent by: Text to cell phone: [phone]  Will anyone else be joining your video visit? No          Assessment & Plan     Morbid obesity (H)    - phentermine (ADIPEX-P) 37.5 MG capsule; Take 1 capsule (37.5 mg) by mouth every morning  - topiramate (TOPAMAX) 50 MG tablet; Take 0.5 tablets (25 mg) by mouth daily for 7 days, THEN 0.5 tablets (25 mg) 2 times daily for 7 days, THEN 1 tablet (50 mg) 2 times daily for 90 days.    Class 3 severe obesity due to excess calories without serious comorbidity with body mass index (BMI) of 40.0 to 44.9 in adult (H)    - phentermine (ADIPEX-P) 37.5 MG capsule; Take 1 capsule (37.5 mg) by mouth every morning  - topiramate (TOPAMAX) 50 MG tablet; Take 0.5 tablets (25 mg) by mouth daily for 7 days, THEN 0.5 tablets (25 mg) 2 times daily for 7 days, THEN 1 tablet (50 mg) 2 times daily for 90 days.    Squamous cell carcinoma of cervix, stage 1 (H)      Tobacco use disorder      Review of external notes as documented elsewhere in note  Prescription drug management  25 minutes spent by me on the date of the encounter doing chart review, history and exam, documentation and further activities per the note     Work on weight loss  Regular exercise  See Patient Instructions    JORDI MENDEZ  [facility] ROSECHRIS Shelby is a 38 year old, presenting for the following health issues:  Follow Up        1/10/2024     1:01 PM   Additional Questions   Roomed by Yasmin SHRESTHA     Medication Followup of Phentermine   Taking Medication as prescribed: yes  Side Effects:  None  Medication Helping Symptoms:  yes    Got  job; Will be starting training in [place] for 7 weeks.  Felt phentermine helped with eating less, weight and energy level.  She did take a break to  see if it will be more effective again.  Is not exercising currently.  Discussed review after visit summary tips.  Changing diet and exercise routine while on medication will her to maintain weight loss once stopping.  Mental health stable.  No other concerns at this time.  Discussed restarting can try Topamax as well as this can add to weight loss when there is a plateau.  If develops anxiety with Topamax stop taking.  She discusses potential risks to  of HPV with her cervical cancer history.    Review of Systems   Constitutional, HEENT, cardiovascular, pulmonary, GI, , musculoskeletal, neuro, skin, endocrine and psych systems are negative, except as otherwise noted.      Objective           Vitals:  No vitals were obtained today due to virtual visit.    Physical Exam   GENERAL: Healthy, alert and no distress  EYES: Eyes grossly normal to inspection.  No discharge or erythema, or obvious scleral/conjunctival abnormalities.  RESP: No audible wheeze, cough, or visible cyanosis.  No visible retractions or increased work of breathing.    SKIN: Visible skin clear. No significant rash, abnormal pigmentation or lesions.  NEURO: Cranial nerves grossly intact.  Mentation and speech appropriate for age.  PSYCH: Mentation appears normal, affect normal/bright, judgement and insight intact, normal speech and appearance well-groomed.    See orders    Video-Visit Details    Type of service:  Video Visit   Originating Location (pt. Location): Home    Distant Location (provider location):  Off-site  Platform used for Video Visit: SIGFOX

## 2024-04-18 ENCOUNTER — VIRTUAL VISIT (OUTPATIENT)
Dept: FAMILY MEDICINE | Facility: CLINIC | Age: 39
End: 2024-04-18
Payer: COMMERCIAL

## 2024-04-18 DIAGNOSIS — E66.813 CLASS 3 SEVERE OBESITY DUE TO EXCESS CALORIES WITHOUT SERIOUS COMORBIDITY WITH BODY MASS INDEX (BMI) OF 40.0 TO 44.9 IN ADULT (H): ICD-10-CM

## 2024-04-18 DIAGNOSIS — E66.01 MORBID OBESITY (H): ICD-10-CM

## 2024-04-18 DIAGNOSIS — E66.01 CLASS 3 SEVERE OBESITY DUE TO EXCESS CALORIES WITHOUT SERIOUS COMORBIDITY WITH BODY MASS INDEX (BMI) OF 40.0 TO 44.9 IN ADULT (H): ICD-10-CM

## 2024-04-18 PROCEDURE — 99213 OFFICE O/P EST LOW 20 MIN: CPT | Mod: 95 | Performed by: NURSE PRACTITIONER

## 2024-04-18 RX ORDER — PHENTERMINE HYDROCHLORIDE 37.5 MG/1
37.5 CAPSULE ORAL EVERY MORNING
Qty: 30 CAPSULE | Refills: 2 | Status: SHIPPED | OUTPATIENT
Start: 2024-04-18 | End: 2024-08-27

## 2024-04-18 RX ORDER — TOPIRAMATE 50 MG/1
50 TABLET, FILM COATED ORAL DAILY
Qty: 180 TABLET | Refills: 1 | Status: SHIPPED | OUTPATIENT
Start: 2024-04-18 | End: 2024-08-27

## 2024-04-18 ASSESSMENT — ASTHMA QUESTIONNAIRES
QUESTION_4 LAST FOUR WEEKS HOW OFTEN HAVE YOU USED YOUR RESCUE INHALER OR NEBULIZER MEDICATION (SUCH AS ALBUTEROL): NOT AT ALL
QUESTION_1 LAST FOUR WEEKS HOW MUCH OF THE TIME DID YOUR ASTHMA KEEP YOU FROM GETTING AS MUCH DONE AT WORK, SCHOOL OR AT HOME: NONE OF THE TIME
QUESTION_5 LAST FOUR WEEKS HOW WOULD YOU RATE YOUR ASTHMA CONTROL: COMPLETELY CONTROLLED
QUESTION_3 LAST FOUR WEEKS HOW OFTEN DID YOUR ASTHMA SYMPTOMS (WHEEZING, COUGHING, SHORTNESS OF BREATH, CHEST TIGHTNESS OR PAIN) WAKE YOU UP AT NIGHT OR EARLIER THAN USUAL IN THE MORNING: NOT AT ALL
ACT_TOTALSCORE: 25
ACT_TOTALSCORE: 25
QUESTION_2 LAST FOUR WEEKS HOW OFTEN HAVE YOU HAD SHORTNESS OF BREATH: NOT AT ALL

## 2024-04-18 NOTE — PROGRESS NOTES
Heron is a 39 year old who is being evaluated via a billable video visit.    How would you like to obtain your AVS? [employer]  If the video visit is dropped, the invitation should be resent by: Text to cell phone: [phone]  Will anyone else be joining your video visit? No      Assessment & Plan     Morbid obesity (H)    - phentermine (ADIPEX-P) 37.5 MG capsule; Take 1 capsule (37.5 mg) by mouth every morning  - topiramate (TOPAMAX) 50 MG tablet; Take 1 tablet (50 mg) by mouth daily    Class 3 severe obesity due to excess calories without serious comorbidity with body mass index (BMI) of 40.0 to 44.9 in adult (H)    - phentermine (ADIPEX-P) 37.5 MG capsule; Take 1 capsule (37.5 mg) by mouth every morning  - topiramate (TOPAMAX) 50 MG tablet; Take 1 tablet (50 mg) by mouth daily    Prescription drug management  23 minutes spent by me on the date of the encounter doing chart review, history and exam, documentation and further activities per the note      BMI  Estimated body mass index is 38.08 kg/m  as calculated from the following:    Height as of 12/12/23: 1.524 m (5').    Weight as of 12/12/23: 88.5 kg (195 lb).   Weight management plan: Discussed healthy diet and exercise guidelines finished training for -active job and phentermine are helping her to lose weight she has more energy and motivation.  Mental health is good.      Work on weight loss  Regular exercise  See Patient Instructions    Subjective   Heron is a 39 year old, presenting for the following health issues:     finished training for -active job and phentermine are helping her to lose weight she has more energy and motivation.  Mental health is good.    Recheck Medication      4/18/2024     8:39 AM   Additional Questions   Roomed by Patient echecked in via mychart   Accompanied by na         4/18/2024     8:39 AM   Patient Reported Additional Medications   Patient reports taking the following new medications na        History of Present Illness       Reason for visit:  3 month prescription renewal for phentermine    She eats 0-1 servings of fruits and vegetables daily.She consumes 2 sweetened beverage(s) daily.She exercises with enough effort to increase her heart rate 10 to 19 minutes per day.  She exercises with enough effort to increase her heart rate 5 days per week.   She is taking medications regularly.         Review of Systems  Constitutional, HEENT, cardiovascular, pulmonary, GI, , musculoskeletal, neuro, skin, endocrine and psych systems are negative, except as otherwise noted.      Objective           Vitals:  No vitals were obtained today due to virtual visit.    Physical Exam   GENERAL: alert and no distress  EYES: Eyes grossly normal to inspection.  No discharge or erythema, or obvious scleral/conjunctival abnormalities.  RESP: No audible wheeze, cough, or visible cyanosis.    SKIN: Visible skin clear. No significant rash, abnormal pigmentation or lesions.  NEURO: Cranial nerves grossly intact.  Mentation and speech appropriate for age.  PSYCH: Appropriate affect, tone, and pace of words    See orders      Video-Visit Details    Type of service:  Video Visit   Originating Location (pt. Location): Home    Distant Location (provider location):  On-site  Platform used for Video Visit: Shawn  Signed Electronically by: JORDI MENDEZ

## 2024-05-01 ENCOUNTER — OFFICE VISIT (OUTPATIENT)
Dept: URGENT CARE | Facility: URGENT CARE | Age: 39
End: 2024-05-01
Payer: COMMERCIAL

## 2024-05-01 ENCOUNTER — E-VISIT (OUTPATIENT)
Dept: URGENT CARE | Facility: CLINIC | Age: 39
End: 2024-05-01
Payer: COMMERCIAL

## 2024-05-01 VITALS
DIASTOLIC BLOOD PRESSURE: 86 MMHG | OXYGEN SATURATION: 99 % | SYSTOLIC BLOOD PRESSURE: 133 MMHG | BODY MASS INDEX: 36.91 KG/M2 | WEIGHT: 189 LBS | RESPIRATION RATE: 23 BRPM | TEMPERATURE: 98.3 F | HEART RATE: 79 BPM

## 2024-05-01 DIAGNOSIS — H92.03 OTALGIA, BILATERAL: Primary | ICD-10-CM

## 2024-05-01 DIAGNOSIS — H69.91 DYSFUNCTION OF RIGHT EUSTACHIAN TUBE: Primary | ICD-10-CM

## 2024-05-01 PROCEDURE — 99207 PR NON-BILLABLE SERV PER CHARTING: CPT | Performed by: PHYSICIAN ASSISTANT

## 2024-05-01 PROCEDURE — 99213 OFFICE O/P EST LOW 20 MIN: CPT | Performed by: PHYSICIAN ASSISTANT

## 2024-05-01 NOTE — PATIENT INSTRUCTIONS
Use Flonase (fluticasone) as directed- 2 sprays in each nostril daily until symptoms resolve then continue with 1 spray in each nostril daily for 5 more days.  Afrin (oxymetazoline) nasal spray twice daily for 3 days. Stop after 3 days.- before flight  Ibuprofen as needed for pain relief and to reduce inflammation.  Warm compresses next to ear for pain relief.  Massage behind the ear to encourage ear to drain.  Drink plenty of fluids and place a humidifier in bedroom.  Follow up with primary care provider in 10-14 days with any concern of persistent pain.

## 2024-05-01 NOTE — PATIENT INSTRUCTIONS
Dear Heron Gee,    We are sorry you are not feeling well. Based on the responses you provided, it is recommended that you be seen in-person in urgent care so we can better evaluate your symptoms. Please click here to find the nearest urgent care location to you.   You will not be charged for this Visit. Thank you for trusting us with your care.    Jin Hernández PA-C

## 2024-05-01 NOTE — PROGRESS NOTES
Assessment & Plan     Dysfunction of right eustachian tube  - Adult ENT  Referral; Future    We discussed symptomatic measures to help clear the fluid in her ear.  Heat, massage, ibuprofen, Flonase.  Afrin can be used intermittently before flights.  Because she is a  which may make it more difficult for the fluid to drain, I did enter referral for her to see ENT.  I recommend trying the symptomatic measures first for at least a week.  If not clearing, schedule an appointment for ENT at next available to discuss further evaluation and treatment.    Return in about 1 month (around 6/1/2024).     Mayela Chirinos PA-C  [facility]    Subjective   Heron Gee is a 39 year old who presents for the following health issues     Patient presents with:  Otalgia: Rt ear pain for about a week. Recently started flight attending job; states she feels consistent pressure and aching in Rt ear, some fluid, muffled hearing and occasional sharp pains.       HENRIETTA Shelby presents clinic today for evaluation of right ear pain.  She recently started a new job as a  and notes that she has had intermittent sinus congestion.  This seems to be coming and going and has not been a huge problem.  However, she developed pain in her right ear about a week ago.  The pain also seems to come and go but has been more persistent.  It was very sharp last night.  She feels a lot of pressure in this ear and her hearing has been muffled.  There is been no discharge from her ear, she has had no fever.    Review of Systems   ROS negative except as stated above.      Objective    /86 (BP Location: Left arm, Cuff Size: Adult Regular)   Pulse 79   Temp 98.3  F (36.8  C) (Tympanic)   Resp 23   Wt 85.7 kg (189 lb)   LMP 06/01/2021   SpO2 99%   BMI 36.91 kg/m    Physical Exam   GENERAL: alert and no distress  EYES: Eyes grossly normal to inspection, PERRL and conjunctivae and  sclerae normal  HENT: ear canals normal bilaterally, right tympanic membrane in the neutral position with a good cone of light, visible air-fluid level with a clear yellow serous effusion.  Left tympanic membrane gray, good cone of light, no effusion noted   NECK: no adenopathy, no asymmetry, masses, or scars  RESP: lungs clear to auscultation - no rales, rhonchi or wheezes  CV: regular rate and rhythm, normal S1 S2, no S3 or S4, no murmur, click or rub, no peripheral edema    No results found for any visits on 05/01/24.

## 2024-07-05 ENCOUNTER — PATIENT OUTREACH (OUTPATIENT)
Dept: CARE COORDINATION | Facility: CLINIC | Age: 39
End: 2024-07-05
Payer: COMMERCIAL

## 2024-07-15 ENCOUNTER — TELEPHONE (OUTPATIENT)
Dept: FAMILY MEDICINE | Facility: CLINIC | Age: 39
End: 2024-07-15
Payer: COMMERCIAL

## 2024-08-22 SDOH — HEALTH STABILITY: PHYSICAL HEALTH: ON AVERAGE, HOW MANY DAYS PER WEEK DO YOU ENGAGE IN MODERATE TO STRENUOUS EXERCISE (LIKE A BRISK WALK)?: 4 DAYS

## 2024-08-22 SDOH — HEALTH STABILITY: PHYSICAL HEALTH: ON AVERAGE, HOW MANY MINUTES DO YOU ENGAGE IN EXERCISE AT THIS LEVEL?: 20 MIN

## 2024-08-22 ASSESSMENT — ASTHMA QUESTIONNAIRES
QUESTION_1 LAST FOUR WEEKS HOW MUCH OF THE TIME DID YOUR ASTHMA KEEP YOU FROM GETTING AS MUCH DONE AT WORK, SCHOOL OR AT HOME: NONE OF THE TIME
QUESTION_3 LAST FOUR WEEKS HOW OFTEN DID YOUR ASTHMA SYMPTOMS (WHEEZING, COUGHING, SHORTNESS OF BREATH, CHEST TIGHTNESS OR PAIN) WAKE YOU UP AT NIGHT OR EARLIER THAN USUAL IN THE MORNING: NOT AT ALL
ACT_TOTALSCORE: 25
QUESTION_2 LAST FOUR WEEKS HOW OFTEN HAVE YOU HAD SHORTNESS OF BREATH: NOT AT ALL
QUESTION_4 LAST FOUR WEEKS HOW OFTEN HAVE YOU USED YOUR RESCUE INHALER OR NEBULIZER MEDICATION (SUCH AS ALBUTEROL): NOT AT ALL
QUESTION_5 LAST FOUR WEEKS HOW WOULD YOU RATE YOUR ASTHMA CONTROL: COMPLETELY CONTROLLED
ACT_TOTALSCORE: 25

## 2024-08-22 ASSESSMENT — SOCIAL DETERMINANTS OF HEALTH (SDOH): HOW OFTEN DO YOU GET TOGETHER WITH FRIENDS OR RELATIVES?: TWICE A WEEK

## 2024-08-27 ENCOUNTER — OFFICE VISIT (OUTPATIENT)
Dept: FAMILY MEDICINE | Facility: CLINIC | Age: 39
End: 2024-08-27
Payer: COMMERCIAL

## 2024-08-27 ENCOUNTER — ONCOLOGY VISIT (OUTPATIENT)
Dept: ONCOLOGY | Facility: CLINIC | Age: 39
End: 2024-08-27
Attending: OBSTETRICS & GYNECOLOGY
Payer: COMMERCIAL

## 2024-08-27 ENCOUNTER — TRANSFERRED RECORDS (OUTPATIENT)
Dept: MULTI SPECIALTY CLINIC | Facility: CLINIC | Age: 39
End: 2024-08-27

## 2024-08-27 VITALS
WEIGHT: 190.8 LBS | OXYGEN SATURATION: 97 % | HEART RATE: 85 BPM | BODY MASS INDEX: 37.46 KG/M2 | TEMPERATURE: 97.1 F | RESPIRATION RATE: 18 BRPM | DIASTOLIC BLOOD PRESSURE: 84 MMHG | HEIGHT: 60 IN | SYSTOLIC BLOOD PRESSURE: 124 MMHG

## 2024-08-27 VITALS
SYSTOLIC BLOOD PRESSURE: 121 MMHG | HEART RATE: 97 BPM | OXYGEN SATURATION: 95 % | WEIGHT: 189.2 LBS | TEMPERATURE: 98.2 F | HEIGHT: 60 IN | DIASTOLIC BLOOD PRESSURE: 85 MMHG | BODY MASS INDEX: 37.15 KG/M2

## 2024-08-27 DIAGNOSIS — Z13.29 SCREENING FOR THYROID DISORDER: ICD-10-CM

## 2024-08-27 DIAGNOSIS — Z85.41 HX OF CERVICAL CANCER: ICD-10-CM

## 2024-08-27 DIAGNOSIS — C53.9 SQUAMOUS CELL CARCINOMA OF CERVIX, STAGE 1 (H): Primary | ICD-10-CM

## 2024-08-27 DIAGNOSIS — Z13.220 SCREENING CHOLESTEROL LEVEL: ICD-10-CM

## 2024-08-27 DIAGNOSIS — Z13.1 SCREENING FOR DIABETES MELLITUS: ICD-10-CM

## 2024-08-27 DIAGNOSIS — E66.01 MORBID OBESITY (H): ICD-10-CM

## 2024-08-27 DIAGNOSIS — E66.813 CLASS 3 SEVERE OBESITY DUE TO EXCESS CALORIES WITHOUT SERIOUS COMORBIDITY WITH BODY MASS INDEX (BMI) OF 40.0 TO 44.9 IN ADULT (H): ICD-10-CM

## 2024-08-27 DIAGNOSIS — Z12.31 ENCOUNTER FOR SCREENING MAMMOGRAM FOR BREAST CANCER: ICD-10-CM

## 2024-08-27 DIAGNOSIS — Z80.3 FAMILY HISTORY OF MALIGNANT NEOPLASM OF BREAST: ICD-10-CM

## 2024-08-27 DIAGNOSIS — Z00.00 ROUTINE GENERAL MEDICAL EXAMINATION AT A HEALTH CARE FACILITY: Primary | ICD-10-CM

## 2024-08-27 DIAGNOSIS — E66.01 CLASS 3 SEVERE OBESITY DUE TO EXCESS CALORIES WITHOUT SERIOUS COMORBIDITY WITH BODY MASS INDEX (BMI) OF 40.0 TO 44.9 IN ADULT (H): ICD-10-CM

## 2024-08-27 LAB
CHOLEST SERPL-MCNC: 211 MG/DL
FASTING STATUS PATIENT QL REPORTED: YES
FASTING STATUS PATIENT QL REPORTED: YES
GLUCOSE SERPL-MCNC: 91 MG/DL (ref 70–99)
HDLC SERPL-MCNC: 34 MG/DL
LDLC SERPL CALC-MCNC: 140 MG/DL
NONHDLC SERPL-MCNC: 177 MG/DL
PAP SMEAR - HIM PATIENT REPORTED: NORMAL
TRIGL SERPL-MCNC: 187 MG/DL
TSH SERPL DL<=0.005 MIU/L-ACNC: 1.86 UIU/ML (ref 0.3–4.2)

## 2024-08-27 PROCEDURE — 99213 OFFICE O/P EST LOW 20 MIN: CPT | Performed by: OBSTETRICS & GYNECOLOGY

## 2024-08-27 PROCEDURE — 82947 ASSAY GLUCOSE BLOOD QUANT: CPT | Performed by: NURSE PRACTITIONER

## 2024-08-27 PROCEDURE — 99213 OFFICE O/P EST LOW 20 MIN: CPT | Mod: 25 | Performed by: NURSE PRACTITIONER

## 2024-08-27 PROCEDURE — 99395 PREV VISIT EST AGE 18-39: CPT | Mod: 25 | Performed by: NURSE PRACTITIONER

## 2024-08-27 PROCEDURE — 36415 COLL VENOUS BLD VENIPUNCTURE: CPT | Performed by: NURSE PRACTITIONER

## 2024-08-27 PROCEDURE — 80061 LIPID PANEL: CPT | Performed by: NURSE PRACTITIONER

## 2024-08-27 PROCEDURE — 90472 IMMUNIZATION ADMIN EACH ADD: CPT | Performed by: NURSE PRACTITIONER

## 2024-08-27 PROCEDURE — G0145 SCR C/V CYTO,THINLAYER,RESCR: HCPCS | Performed by: OBSTETRICS & GYNECOLOGY

## 2024-08-27 PROCEDURE — 90471 IMMUNIZATION ADMIN: CPT | Performed by: NURSE PRACTITIONER

## 2024-08-27 PROCEDURE — 90746 HEPB VACCINE 3 DOSE ADULT IM: CPT | Performed by: NURSE PRACTITIONER

## 2024-08-27 PROCEDURE — 84443 ASSAY THYROID STIM HORMONE: CPT | Performed by: NURSE PRACTITIONER

## 2024-08-27 PROCEDURE — 87624 HPV HI-RISK TYP POOLED RSLT: CPT | Performed by: OBSTETRICS & GYNECOLOGY

## 2024-08-27 PROCEDURE — 90651 9VHPV VACCINE 2/3 DOSE IM: CPT | Performed by: NURSE PRACTITIONER

## 2024-08-27 RX ORDER — ALBUTEROL SULFATE 90 UG/1
2 AEROSOL, METERED RESPIRATORY (INHALATION) EVERY 6 HOURS
Qty: 18 G | Refills: 3 | Status: SHIPPED | OUTPATIENT
Start: 2024-08-27

## 2024-08-27 RX ORDER — PHENTERMINE HYDROCHLORIDE 37.5 MG/1
37.5 CAPSULE ORAL EVERY MORNING
Qty: 30 CAPSULE | Refills: 3 | Status: SHIPPED | OUTPATIENT
Start: 2024-08-27

## 2024-08-27 ASSESSMENT — PAIN SCALES - GENERAL: PAINLEVEL: NO PAIN (0)

## 2024-08-27 NOTE — PROGRESS NOTES
Preventive Care Visit  [facility] JRODI OJEDA, Family Medicine  Aug 27, 2024      Assessment & Plan     Routine general medical examination at a health care facility      Morbid obesity (H)    - phentermine (ADIPEX-P) 37.5 MG capsule; Take 1 capsule (37.5 mg) by mouth every morning.    Class 3 severe obesity due to excess calories without serious comorbidity with body mass index (BMI) of 40.0 to 44.9 in adult (H)    - phentermine (ADIPEX-P) 37.5 MG capsule; Take 1 capsule (37.5 mg) by mouth every morning.    Family history of malignant neoplasm of breast    - MA Screen Bilateral w/Payam; Future    Hx of cervical cancer    - MA Screen Bilateral w/Payam; Future    Encounter for screening mammogram for breast cancer    - MA Screen Bilateral w/Payam; Future    Screening for diabetes mellitus    - Glucose; Future  - Glucose    Screening for thyroid disorder    - TSH with free T4 reflex; Future  - TSH with free T4 reflex    Screening cholesterol level    - Lipid panel reflex to direct LDL Fasting; Future  - Lipid panel reflex to direct LDL Fasting    Patient has been advised of split billing requirements and indicates understanding: Yes        Nicotine/Tobacco Cessation  She reports that she has been smoking cigarettes. She has a 6.5 pack-year smoking history. She has never used smokeless tobacco.  Nicotine/Tobacco Cessation Plan  Information offered: Patient not interested at this time      Counseling  Appropriate preventive services were addressed with this patient via screening, questionnaire, or discussion as appropriate for fall prevention, nutrition, physical activity, Tobacco-use cessation, social engagement, weight loss and cognition.  Checklist reviewing preventive services available has been given to the patient.  Reviewed patient's diet, addressing concerns and/or questions.   She is at risk for psychosocial distress and has been provided with information to reduce risk.        Work on weight loss  Regular exercise  See Patient Instructions    Subjective   Heron is a 39 year old, presenting for the following:  Physical / Refills / Labwork / Referrals  and Family history of Breast Cancer  Patient working for [employer] as a , loves her job.  Mental health is good.  Has lost 20+ pounds with phentermine feels it works well for her would like to continue.  Blood pressure controlled.  Discussed controlled substance agreement needed annually and follow-up visit every 4 months for refill.  She does smoke cigarettes she is not interested in quitting at this time.  Breathing has been well-controlled using albuterol inhaler rarely.  Discussed risks of smoking.  She has Paps done by oncologist due to cervical cancer history, history of hysterectomy.  She would like HPV and hep B vaccine today.  Would like mammogram due to her history and family history of breast cancer.  In agreement to screening labs.        8/27/2024     8:31 AM   Additional Questions   Roomed by Carmen RODRIGUEZ   Accompanied by Daughter and Son          HPI              8/22/2024   General Health   How would you rate your overall physical health? Good   Feel stress (tense, anxious, or unable to sleep) Only a little      (!) STRESS CONCERN      8/22/2024   Nutrition   Three or more servings of calcium each day? Yes   Diet: Regular (no restrictions)   How many servings of fruit and vegetables per day? (!) 0-1   How many sweetened beverages each day? (!) 2            8/22/2024   Exercise   Days per week of moderate/strenous exercise 4 days   Average minutes spent exercising at this level 20 min            8/22/2024   Social Factors   Frequency of gathering with friends or relatives Twice a week   Worry food won't last until get money to buy more No   Food not last or not have enough money for food? No   Do you have housing? (Housing is defined as stable permanent housing and does not include staying ouside in a car, in a  tent, in an abandoned building, in an overnight shelter, or couch-surfing.) Yes   Are you worried about losing your housing? No   Lack of transportation? No   Unable to get utilities (heat,electricity)? No            8/22/2024   Dental   Dentist two times every year? Yes            8/22/2024   TB Screening   Were you born outside of the [place]? No                  8/22/2024   Substance Use   If I could quit smoking, I would Somewhat agree   I want to quit somking, worry about health affects Completely agree   Willing to make a plan to quit smoking Somewhat agree   Willing to cut down before quitting Somewhat agree   Alcohol more than 3/day or more than 7/wk No   Do you use any other substances recreationally? No        Social History     Tobacco Use    Smoking status: Every Day     Current packs/day: 0.50     Average packs/day: 0.5 packs/day for 13.0 years (6.5 ttl pk-yrs)     Types: Cigarettes    Smokeless tobacco: Never   Vaping Use    Vaping status: Never Used   Substance Use Topics    Alcohol use: Yes    Drug use: No           12/5/2023   LAST FHS-7 RESULTS   1st degree relative breast or ovarian cancer No   Any relative bilateral breast cancer No   Any male have breast cancer No   Any ONE woman have BOTH breast AND ovarian cancer No   Any woman with breast cancer before 50yrs No   2 or more relatives with breast AND/OR ovarian cancer No   2 or more relatives with breast AND/OR bowel cancer No           Mammogram Screening - Patient under 40 years of age: Routine Mammogram Screening not recommended.   Family history of breast cancer, personal history of cervical cancer if she would like early breast cancer screening.  Discussed verifying coverage with insurance prior to scheduling        8/22/2024   STI Screening   New sexual partner(s) since last STI/HIV test? No        History of abnormal Pap smear: History of cervical cancer, hysterectomy.  Has Paps done by oncologist        Latest Ref Rng & Units 6/13/2023      4:03 PM 2022     3:46 PM 2021     3:45 PM   PAP / HPV   PAP  Negative for Intraepithelial Lesion or Malignancy (NILM)  Negative for Intraepithelial Lesion or Malignancy (NILM)     HPV 16 DNA NEG^Negative   Negative    HPV 18 DNA NEG^Negative   Negative    Other HR HPV NEG^Negative   Positive           Reviewed and updated as needed this visit by Provider                    Past Medical History:   Diagnosis Date    Abnormal Pap smear of cervix     4/17/15, 2019. 21    Cervical high risk HPV (human papillomavirus) test positive     04/15/2019, 10/28/19, 21    Cervix cancer (H)     Diabetes (H)     Exercise-induced asthma      Past Surgical History:   Procedure Laterality Date    APPENDECTOMY OPEN CHILD       SECTION  09/04/2019    x 2    COLONOSCOPY N/A 10/4/2021    Procedure: COLONOSCOPY, WITH POLYPECTOMY AND BIOPSY;  Surgeon: Cl Comer MD;  Location: [facility]    COLPOSCOPY, CONIZATION, COMBINED N/A 4/15/2021    Procedure: Cold Knife Cone of the cervix;  Surgeon: Fritz Barron MD;  Location: [facility]    ENT SURGERY      LAPAROSCOPIC HYSTERECTOMY TOTAL, SALPINGECTOMY BILATERAL Bilateral 6/10/2021    Procedure: Total laparoscopic hysterectomy, bilateral salpingectomy;  Surgeon: Fritz Barron MD;  Location: [facility]    LEEP TX, CERVICAL      NOSE SURGERY      Deviated septum     OB History    Para Term  AB Living   3 2 2 0 1 2   SAB IAB Ectopic Multiple Live Births   0 0 0 0 2      # Outcome Date GA Lbr Master/2nd Weight Sex Type Anes PTL Lv   3 Term 19 39w2d   M CS-LTranv   KHOI   2 Term 12/06/15 40w0d  3.515 kg (7 lb 12 oz) F CS-LTranv  N KHOI      Name: Alexander   1 AB              Lab work is in process  Labs reviewed in EPIC  BP Readings from Last 3 Encounters:   24 124/84   24 133/86   23 137/88    Wt Readings from Last 3 Encounters:   24 86.5 kg (190 lb 12.8 oz)   24 85.7 kg (189 lb)   23 88.5 kg (195 lb)                  Patient Active  Problem List   Diagnosis    Supervision of normal first pregnancy    Obesity    Family history of diabetes mellitus    Cervical cancer (H)    Previous  delivery, antepartum condition or complication    Squamous cell carcinoma of cervix, stage 1 (H)    Morbid obesity (H)    Tobacco use disorder    Hx of human papillomavirus infection    Snoring    Excessive daytime sleepiness    Family history of sleep apnea    Family history of malignant neoplasm of breast    Hx of cervical cancer     Past Surgical History:   Procedure Laterality Date    APPENDECTOMY OPEN CHILD       SECTION  09/04/2019    x 2    COLONOSCOPY N/A 10/4/2021    Procedure: COLONOSCOPY, WITH POLYPECTOMY AND BIOPSY;  Surgeon: Cl Comer MD;  Location: [facility]    COLPOSCOPY, CONIZATION, COMBINED N/A 4/15/2021    Procedure: Cold Knife Cone of the cervix;  Surgeon: Fritz Barron MD;  Location: [facility]    ENT SURGERY      LAPAROSCOPIC HYSTERECTOMY TOTAL, SALPINGECTOMY BILATERAL Bilateral 6/10/2021    Procedure: Total laparoscopic hysterectomy, bilateral salpingectomy;  Surgeon: Fritz Barron MD;  Location: [facility]    LEEP TX, CERVICAL      NOSE SURGERY      Deviated septum       Social History     Tobacco Use    Smoking status: Every Day     Current packs/day: 0.50     Average packs/day: 0.5 packs/day for 13.0 years (6.5 ttl pk-yrs)     Types: Cigarettes    Smokeless tobacco: Never   Substance Use Topics    Alcohol use: Yes     Family History   Problem Relation Age of Onset    Diabetes Brother     Cancer Paternal Great-Grandmother     Breast Cancer Paternal Great-Grandmother     Breast Cancer Cousin         Before age 50    Hypertension No family hx of     Ovarian Cancer No family hx of     Cerebrovascular Disease No family hx of     Thyroid Disease No family hx of          Current Outpatient Medications   Medication Sig Dispense Refill    albuterol (PROAIR HFA/PROVENTIL HFA/VENTOLIN HFA) 108 (90 Base) MCG/ACT inhaler Inhale 2 puffs into  the lungs every 6 hours. 18 g 3    cholecalciferol (VITAMIN D3) 25 mcg (1000 units) capsule Take 1 capsule by mouth daily      fexofenadine (ALLEGRA) 180 MG tablet Take 180 mg by mouth daily      fish oil-omega-3 fatty acids 500 MG capsule       phentermine (ADIPEX-P) 37.5 MG capsule Take 1 capsule (37.5 mg) by mouth every morning. 30 capsule 3     No Known Allergies  Recent Labs   Lab Test 08/03/23  1509 05/09/22  1557 08/26/21  1613 06/22/21  2300 01/29/21  1542 06/14/19  0912 03/24/19  1002 02/10/17  1616   A1C  --   --   --   --   --  4.9  --  5.1   *  --   --   --   --   --   --   --    HDL 29*  --   --   --   --   --   --   --    TRIG 443*  --   --   --   --   --   --   --    ALT  --   --   --  34 29  --   --  38   CR  --   --   --  0.72 0.83  --    < > 0.84   GFRESTIMATED  --   --   --  >90 >90  --    < > 80   GFRESTBLACK  --   --   --  >90 >90  --    < > >90   GFR Calc     POTASSIUM  --   --   --  4.2 3.5  --    < > 4.1   TSH 1.76 1.64   < >  --  1.08  --   --  1.87    < > = values in this interval not displayed.          Review of Systems  CONSTITUTIONAL: NEGATIVE for fever, chills, change in weight  INTEGUMENTARY/SKIN: NEGATIVE for worrisome rashes, moles or lesions  EYES: NEGATIVE for vision changes or irritation  ENT/MOUTH: NEGATIVE for ear, mouth and throat problems  RESP: NEGATIVE for significant cough or SOB  BREAST: NEGATIVE for masses, tenderness or discharge  CV: NEGATIVE for chest pain, palpitations or peripheral edema  GI: NEGATIVE for nausea, abdominal pain, heartburn, or change in bowel habits  : NEGATIVE for frequency, dysuria, or hematuria  MUSCULOSKELETAL: NEGATIVE for significant arthralgias or myalgia  NEURO: NEGATIVE for weakness, dizziness or paresthesias  ENDOCRINE: NEGATIVE for temperature intolerance, skin/hair changes  HEME: NEGATIVE for bleeding problems  PSYCHIATRIC: NEGATIVE for changes in mood or affect     Objective    Exam  /84   Pulse 85   Temp  "97.1  F (36.2  C) (Temporal)   Resp 18   Ht 1.52 m (4' 11.84\")   Wt 86.5 kg (190 lb 12.8 oz)   LMP 06/01/2021   SpO2 97%   BMI 37.46 kg/m     Estimated body mass index is 37.46 kg/m  as calculated from the following:    Height as of this encounter: 1.52 m (4' 11.84\").    Weight as of this encounter: 86.5 kg (190 lb 12.8 oz).    Physical Exam  GENERAL: alert and no distress  EYES: Eyes grossly normal to inspection, PERRL and conjunctivae and sclerae normal  HENT: ear canals and TM's normal, nose and mouth without ulcers or lesions  NECK: no adenopathy, no asymmetry, masses, or scars  RESP: lungs clear to auscultation - no rales, rhonchi or wheezes  BREAST: Deferred per guidelines-asymptomatic per patient.  Discussed self breast exam, symptoms to watch out for and when to follow-up  CV: regular rate and rhythm, normal S1 S2, no S3 or S4, no murmur, click or rub, no peripheral edema  ABDOMEN: soft, nontender, no hepatosplenomegaly, no masses and bowel sounds normal   (female): Deferred per patient no concerns.  History of hysterectomy.  Has Paps done by oncologist has appointment later today  MS: no gross musculoskeletal defects noted, no edema, no CVA tenderness  SKIN: no suspicious lesions or rashes  NEURO: Normal strength and tone, cranial nerves intact, mentation intact and speech normal  PSYCH: mentation appears normal, affect normal/bright  LYMPH: no cervical, supraclavicular adenopathy        Signed Electronically by: JORDI MENDEZ    "

## 2024-08-27 NOTE — NURSING NOTE
"Oncology Rooming Note    August 27, 2024 2:36 PM   Heron Gee is a 39 year old female who presents for:    Chief Complaint   Patient presents with    Oncology Clinic Visit     Initial Vitals: /85 (BP Location: Right arm)   Pulse 97   Temp 98.2  F (36.8  C) (Oral)   Ht 1.52 m (4' 11.84\")   Wt 85.8 kg (189 lb 3.2 oz)   LMP 06/01/2021   SpO2 95%   BMI 37.15 kg/m   Estimated body mass index is 37.15 kg/m  as calculated from the following:    Height as of this encounter: 1.52 m (4' 11.84\").    Weight as of this encounter: 85.8 kg (189 lb 3.2 oz). Body surface area is 1.9 meters squared.  No Pain (0) Comment: Data Unavailable   Patient's last menstrual period was 06/01/2021.  Allergies reviewed: Yes  Medications reviewed: Yes    Medications: Medication refills not needed today.  Pharmacy name entered into [employer]: [id number], [place] - [address] [address] [facility] [address]    Frailty Screening:   Is the patient here for a new oncology consult visit in cancer care? 2. No      Clinical concerns: No Concerns        Umair Campuzano MA            " Manuel Tariq), Pediatrics  [address] [id number]  Phone: [phone]  Fax: [phone]

## 2024-08-27 NOTE — LETTER
My Asthma Action Plan    Name: Heron Gee   YOB: 1985  Date: 8/27/2024   My doctor: CONCHA YATES NP   My clinic: [facility]        My Rescue Medicine:   Albuterol inhaler (Proair/Ventolin/Proventil HFA)  2-4 puffs EVERY 4 HOURS as needed. Use a spacer if recommended by your provider.   My Asthma Severity:   Intermittent / Exercise Induced  Know your asthma triggers: upper respiratory infections and exercise or sports             GREEN ZONE   Good Control  I feel good  No cough or wheeze  Can work, sleep and play without asthma symptoms       Take your asthma control medicine every day.     If exercise triggers your asthma, take your rescue medication  15 minutes before exercise or sports, and  During exercise if you have asthma symptoms  Spacer to use with inhaler: If you have a spacer, make sure to use it with your inhaler             YELLOW ZONE Getting Worse  I have ANY of these:  I do not feel good  Cough or wheeze  Chest feels tight  Wake up at night   Keep taking your Green Zone medications  Start taking your rescue medicine:  every 20 minutes for up to 1 hour. Then every 4 hours for 24-48 hours.  If you stay in the Yellow Zone for more than 12-24 hours, contact your doctor.  If you do not return to the Green Zone in 12-24 hours or you get worse, start taking your oral steroid medicine if prescribed by your provider.           RED ZONE Medical Alert - Get Help  I have ANY of these:  I feel awful  Medicine is not helping  Breathing getting harder  Trouble walking or talking  Nose opens wide to breathe       Take your rescue medicine NOW  If your provider has prescribed an oral steroid medicine, start taking it NOW  Call your doctor NOW  If you are still in the Red Zone after 20 minutes and you have not reached your doctor:  Take your rescue medicine again and  Call 911 or go to the emergency room right away    See your regular doctor within 2 weeks of an  Emergency Room or Urgent Care visit for follow-up treatment.          Annual Reminders:  Meet with Asthma Educator,  Flu Shot in the Fall, consider Pneumonia Vaccination for patients with asthma (aged 19 and older).    Pharmacy: [id number] - [place], [place] - [id number] AT [address] [facility] [address]    Electronically signed by CONCHA YATES NP   Date: 08/27/24                    Asthma Triggers  How To Control Things That Make Your Asthma Worse    Triggers are things that make your asthma worse.  Look at the list below to help you find your triggers and   what you can do about them. You can help prevent asthma flare-ups by staying away from your triggers.      Trigger                                                          What you can do   Cigarette Smoke  Tobacco smoke can make asthma worse. Do not allow smoking in your home, car or around you.  Be sure no one smokes at a child s day care or school.  If you smoke, ask your health care provider for ways to help you quit.  Ask family members to quit too.  Ask your health care provider for a referral to Quit Plan to help you quit smoking, or call 2-447-385-PLAN.     Colds, Flu, Bronchitis  These are common triggers of asthma. Wash your hands often.  Don t touch your eyes, nose or mouth.  Get a flu shot every year.     Dust Mites  These are tiny bugs that live in cloth or carpet. They are too small to see. Wash sheets and blankets in hot water every week.   Encase pillows and mattress in dust mite proof covers.  Avoid having carpet if you can. If you have carpet, vacuum weekly.   Use a dust mask and HEPA vacuum.   Pollen and Outdoor Mold  Some people are allergic to trees, grass, or weed pollen, or molds. Try to keep your windows closed.  Limit time out doors when pollen count is high.   Ask you health care provider about taking medicine during allergy season.     Animal Dander  Some people are allergic to skin flakes, urine or saliva from  pets with fur or feathers. Keep pets with fur or feathers out of your home.    If you can t keep the pet outdoors, then keep the pet out of your bedroom.  Keep the bedroom door closed.  Keep pets off cloth furniture and away from stuffed toys.     Mice, Rats, and Cockroaches  Some people are allergic to the waste from these pests.   Cover food and garbage.  Clean up spills and food crumbs.  Store grease in the refrigerator.   Keep food out of the bedroom.   Indoor Mold  This can be a trigger if your home has high moisture. Fix leaking faucets, pipes, or other sources of water.   Clean moldy surfaces.  Dehumidify basement if it is damp and smelly.   Smoke, Strong Odors, and Sprays  These can reduce air quality. Stay away from strong odors and sprays, such as perfume, powder, hair spray, paints, smoke incense, paint, cleaning products, candles and new carpet.   Exercise or Sports  Some people with asthma have this trigger. Be active!  Ask your doctor about taking medicine before sports or exercise to prevent symptoms.    Warm up for 5-10 minutes before and after sports or exercise.     Other Triggers of Asthma  Cold air:  Cover your nose and mouth with a scarf.  Sometimes laughing or crying can be a trigger.  Some medicines and food can trigger asthma.

## 2024-08-27 NOTE — PROGRESS NOTES
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    No change to health history. Occassional abdominal pains, thinks possibly related to constipation. No vaginal bleeding. Working as , which is gong well.    Relevant history:  2019 ASCUS/HPV HR positive  10/2019: Pap negative, HPV HR positive  2019: ECC negative  2021: Pap ASCUS/HPV other HR positive  2021: Colposcopy with EUGENE 2 biopsy and neg ECC  3/15/2021: LEEP with 2.2mm invasive SCC of the cervix extending to endocervical margin, neg LVSI  4/15/2021: CKC, ECC with no residual carcinoma  6/10/2021: Total laparoscopic hysterectomy, bilateral salpingectomy      Physical Exam:  /85 (BP Location: Right arm)   Pulse 97   Temp 98.2  F (36.8  C) (Oral)   Ht 1.52 m (4' 11.84\")   Wt 85.8 kg (189 lb 3.2 oz)   LMP 2021   SpO2 95%   BMI 37.15 kg/m     General appearance: no acute distress, well groomed, sitting comfortably   Abd: soft, non-tender  Lymph: no  inguinal adenopathy  : normal external genitalia  Vagina: normal mucosa without lesions,   Bimanual: intact cuff without nodularity. No masses appreciated. Pap smear collected    Lab:  2022 Pap smear - negative cytology  2023: Pap smear - negative cytology    Assessment:  Heron is a 39 year old  pre-menopausal female with a history of Stage 1A1 squamous cell carcinoma of the cervix s/p total laparoscopic hysterectomy, bilateral salpingectomy here for surveillance visit with no concerns for recurrence on exam.     Plan:  - Squamous cell carcinoma of the cervix: Annual follow-up with Pap smear. Transition to JARAD follow-up at any time.    Return next year " for annual surveillance pending pap smear    Fritz Barron MD   Gynecologic Oncology     I spent a total of 20 minutes on the care of Heron eGe on the day of service including face to face time, care coordination, and documentation on the day of service.

## 2024-08-27 NOTE — LETTER
"2024      Heron Gee  [address] [address] [facility] [address]      Dear Colleague,    Thank you for referring your patient, Heron Gee, to the [facility]. Please see a copy of my visit note below.    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    No change to health history. Occassional abdominal pains, thinks possibly related to constipation. No vaginal bleeding. Working as , which is gong well.    Relevant history:  2019 ASCUS/HPV HR positive  10/2019: Pap negative, HPV HR positive  2019: ECC negative  2021: Pap ASCUS/HPV other HR positive  2021: Colposcopy with EUGENE 2 biopsy and neg ECC  3/15/2021: LEEP with 2.2mm invasive SCC of the cervix extending to endocervical margin, neg LVSI  4/15/2021: CKC, ECC with no residual carcinoma  6/10/2021: Total laparoscopic hysterectomy, bilateral salpingectomy      Physical Exam:  /85 (BP Location: Right arm)   Pulse 97   Temp 98.2  F (36.8  C) (Oral)   Ht 1.52 m (4' 11.84\")   Wt 85.8 kg (189 lb 3.2 oz)   LMP 2021   SpO2 95%   BMI 37.15 kg/m     General appearance: no acute distress, well groomed, sitting comfortably   Abd: soft, non-tender  Lymph: no  inguinal adenopathy  : normal external genitalia  Vagina: normal mucosa without lesions,   Bimanual: intact cuff without nodularity. No masses appreciated. Pap smear collected    Lab:  2022 Pap smear - negative cytology  2023: Pap smear - negative cytology    Assessment:  Heron is a 39 year old  pre-menopausal female with a history of Stage 1A1 squamous cell carcinoma of the cervix s/p total " laparoscopic hysterectomy, bilateral salpingectomy here for surveillance visit with no concerns for recurrence on exam.     Plan:  - Squamous cell carcinoma of the cervix: Annual follow-up with Pap smear. Transition to JARAD follow-up at any time.    Return next year for annual surveillance pending pap smear    Fritz Barron MD   Gynecologic Oncology     I spent a total of 20 minutes on the care of Heron Gee on the day of service including face to face time, care coordination, and documentation on the day of service.       Again, thank you for allowing me to participate in the care of your patient.        Sincerely,        Fritz Barron MD

## 2024-08-27 NOTE — LETTER

## 2024-08-27 NOTE — PATIENT INSTRUCTIONS
Patient Education   Preventive Care Advice   This is general advice given by our system to help you stay healthy. However, your care team may have specific advice just for you. Please talk to your care team about your preventive care needs.  Nutrition  Eat 5 or more servings of fruits and vegetables each day.  Try wheat bread, brown rice and whole grain pasta (instead of white bread, rice, and pasta).  Get enough calcium and vitamin D. Check the label on foods and aim for 100% of the RDA (recommended daily allowance).  Lifestyle  Exercise at least 150 minutes each week  (30 minutes a day, 5 days a week).  Do muscle strengthening activities 2 days a week. These help control your weight and prevent disease.  No smoking.  Wear sunscreen to prevent skin cancer.  Have a dental exam and cleaning every 6 months.  Yearly exams  See your health care team every year to talk about:  Any changes in your health.  Any medicines your care team has prescribed.  Preventive care, family planning, and ways to prevent chronic diseases.  Shots (vaccines)   HPV shots (up to age 26), if you've never had them before.  Hepatitis B shots (up to age 59), if you've never had them before.  COVID-19 shot: Get this shot when it's due.  Flu shot: Get a flu shot every year.  Tetanus shot: Get a tetanus shot every 10 years.  Pneumococcal, hepatitis A, and RSV shots: Ask your care team if you need these based on your risk.  Shingles shot (for age 50 and up)  General health tests  Diabetes screening:  Starting at age 35, Get screened for diabetes at least every 3 years.  If you are younger than age 35, ask your care team if you should be screened for diabetes.  Cholesterol test: At age 39, start having a cholesterol test every 5 years, or more often if advised.  Bone density scan (DEXA): At age 50, ask your care team if you should have this scan for osteoporosis (brittle bones).  Hepatitis C: Get tested at least once in your life.  STIs (sexually  transmitted infections)  Before age 24: Ask your care team if you should be screened for STIs.  After age 24: Get screened for STIs if you're at risk. You are at risk for STIs (including HIV) if:  You are sexually active with more than one person.  You don't use condoms every time.  You or a partner was diagnosed with a sexually transmitted infection.  If you are at risk for HIV, ask about PrEP medicine to prevent HIV.  Get tested for HIV at least once in your life, whether you are at risk for HIV or not.  Cancer screening tests  Cervical cancer screening: If you have a cervix, begin getting regular cervical cancer screening tests starting at age 21.  Breast cancer scan (mammogram): If you've ever had breasts, begin having regular mammograms starting at age 40. This is a scan to check for breast cancer.  Colon cancer screening: It is important to start screening for colon cancer at age 45.  Have a colonoscopy test every 10 years (or more often if you're at risk) Or, ask your provider about stool tests like a FIT test every year or Cologuard test every 3 years.  To learn more about your testing options, visit:   .  For help making a decision, visit:   https://bit.ly/mv46106.  Prostate cancer screening test: If you have a prostate, ask your care team if a prostate cancer screening test (PSA) at age 55 is right for you.  Lung cancer screening: If you are a current or former smoker ages 50 to 80, ask your care team if ongoing lung cancer screenings are right for you.  For informational purposes only. Not to replace the advice of your health care provider. Copyright   2023 [employer]. All rights reserved. Clinically reviewed by the [facility] Transitions Program. [employer] [id number] - REV 01/24.

## 2024-08-28 LAB
HPV HR 12 DNA CVX QL NAA+PROBE: NEGATIVE
HPV16 DNA CVX QL NAA+PROBE: NEGATIVE
HPV18 DNA CVX QL NAA+PROBE: NEGATIVE
HUMAN PAPILLOMA VIRUS FINAL DIAGNOSIS: NORMAL

## 2024-08-28 NOTE — RESULT ENCOUNTER NOTE
Santiago Shelby,    Thank you for your recent office visit.    Here are your recent results.  Normal glucose You are not diabetic, normal thyroid; cholesterol is considered normal with the LDL the bad cholesterol less than 190 in a nondiabetic.  Keep up the good work with your diet, exercise, weight loss.    Feel free to contact me via [employer] or call the clinic at [phone].    Sincerely,    Dana Borja, FELA, FNP-BC

## 2024-08-30 LAB
BKR AP ASSOCIATED HPV REPORT: NORMAL
BKR LAB AP GYN ADEQUACY: NORMAL
BKR LAB AP GYN INTERPRETATION: NORMAL
BKR LAB AP PREVIOUS ABNORMAL: NORMAL
PATH REPORT.COMMENTS IMP SPEC: NORMAL
PATH REPORT.COMMENTS IMP SPEC: NORMAL
PATH REPORT.RELEVANT HX SPEC: NORMAL

## 2024-11-08 ENCOUNTER — OFFICE VISIT (OUTPATIENT)
Dept: URGENT CARE | Facility: URGENT CARE | Age: 39
End: 2024-11-08
Payer: COMMERCIAL

## 2024-11-08 VITALS
SYSTOLIC BLOOD PRESSURE: 149 MMHG | OXYGEN SATURATION: 99 % | DIASTOLIC BLOOD PRESSURE: 89 MMHG | RESPIRATION RATE: 18 BRPM | TEMPERATURE: 97.2 F | WEIGHT: 193 LBS | HEART RATE: 91 BPM | BODY MASS INDEX: 37.89 KG/M2

## 2024-11-08 DIAGNOSIS — J01.90 ACUTE SINUSITIS WITH SYMPTOMS > 10 DAYS: Primary | ICD-10-CM

## 2024-11-08 DIAGNOSIS — H65.191 ACUTE EFFUSION OF RIGHT EAR: ICD-10-CM

## 2024-11-08 PROCEDURE — 99213 OFFICE O/P EST LOW 20 MIN: CPT | Performed by: NURSE PRACTITIONER

## 2024-11-08 ASSESSMENT — PAIN SCALES - GENERAL: PAINLEVEL_OUTOF10: MILD PAIN (3)

## 2024-11-08 NOTE — PATIENT INSTRUCTIONS
Augmentin twice daily for 7 days    Recommended rest, fluids especially warm clear liquids such as tea with honey and lemon, decreasing dairy which can increase congestion, humidifier, steam, nasal irrigation with saline, flonase nasal spray, Zyrtec daily, Sudafed as needed.

## 2024-11-08 NOTE — PROGRESS NOTES
Assessment & Plan     Acute sinusitis with symptoms > 10 days    - amoxicillin-clavulanate (AUGMENTIN) 875-125 MG tablet  Dispense: 14 tablet; Refill: 0    Acute effusion of right ear       Discussed sinus infections are usually caused by viruses and usually resolve within 7-10 days. Due to duration of symptoms, prescription sent to pharmacy for Augmentin twice daily for 7 days. Recommended rest, fluids especially warm clear liquids such as tea with honey and lemon, decreasing dairy which can increase congestion, humidifier, steam, nasal irrigation with saline, flonase nasal spray.    No ear infection currently. Discussed middle ear effusion which can take a few weeks to resolve. Recommended  Zyrtec daily, Sudafed as needed.     Follow-up with PCP if symptoms persist for 7 days, and sooner if symptoms worsen or new symptoms develop.     Discussed red flag symptoms which warrant immediate visit in emergency room    All questions were answered and patient verbalized understanding. AVS sent via [email or web address] Miles, DNP, APRN, CNP 11/8/2024 12:22 PM  [facility] URG[facility] CARE [place]    Rosaura Shelby is a 39 year old female who presents to clinic today with her children for the following health issues:  Chief Complaint   Patient presents with    Ear Problem     Ear pain since oct     Sinus Problem     Facial pressure with headaches no fever since 10/22         11/8/2024    11:52 AM   Additional Questions   Roomed by ca   Accompanied by with childrens       Patient presents for evaluation of ear pain for over 2 weeks. Associated symptoms: sinus pain/pressure, headache, nasal congestion, runny nose, post-nasal drip, fatigue, right shoulder pain.   Has been taking ibuprofen, mucinex, nasal rinse, peroxide in ears help temporarily. Massage helps her shoulder.   She is a  and flies a lot, started right before flying to [place].      Denies fever, teeth pain, cough, shortness of breath.      Problem list, Medication list, Allergies, and Medical history reviewed in EPIC.    ROS:  Review of systems negative except for noted above        Objective    BP (!) 149/89   Pulse 91   Temp 97.2  F (36.2  C) (Tympanic)   Resp 18   Wt 87.5 kg (193 lb)   LMP 06/01/2021   SpO2 99%   BMI 37.89 kg/m    Physical Exam  Constitutional:       General: She is not in acute distress.     Appearance: She is not toxic-appearing or diaphoretic.   HENT:      Head: Normocephalic and atraumatic.      Right Ear: Ear canal and external ear normal. A middle ear effusion is present. Tympanic membrane is not erythematous or bulging.      Left Ear: Tympanic membrane, ear canal and external ear normal.      Nose:      Comments: Moderate nasal congestion     Mouth/Throat:      Mouth: Mucous membranes are moist.      Pharynx: Oropharynx is clear. No oropharyngeal exudate or posterior oropharyngeal erythema.   Cardiovascular:      Rate and Rhythm: Normal rate and regular rhythm.      Heart sounds: Normal heart sounds.   Pulmonary:      Effort: Pulmonary effort is normal. No respiratory distress.      Breath sounds: Normal breath sounds. No wheezing, rhonchi or rales.   Lymphadenopathy:      Cervical: No cervical adenopathy.   Skin:     General: Skin is warm and dry.   Neurological:      Mental Status: She is alert.

## 2024-12-05 ENCOUNTER — OFFICE VISIT (OUTPATIENT)
Dept: URGENT CARE | Facility: URGENT CARE | Age: 39
End: 2024-12-05
Payer: COMMERCIAL

## 2024-12-05 VITALS
BODY MASS INDEX: 38.09 KG/M2 | RESPIRATION RATE: 18 BRPM | OXYGEN SATURATION: 95 % | TEMPERATURE: 97.4 F | WEIGHT: 194 LBS | HEART RATE: 90 BPM | DIASTOLIC BLOOD PRESSURE: 93 MMHG | SYSTOLIC BLOOD PRESSURE: 141 MMHG

## 2024-12-05 DIAGNOSIS — L08.9 FINGER INFECTION: ICD-10-CM

## 2024-12-05 DIAGNOSIS — B00.89 HERPETIC WHITLOW: Primary | ICD-10-CM

## 2024-12-05 RX ORDER — SULFAMETHOXAZOLE AND TRIMETHOPRIM 800; 160 MG/1; MG/1
1 TABLET ORAL 2 TIMES DAILY
Qty: 14 TABLET | Refills: 0 | Status: SHIPPED | OUTPATIENT
Start: 2024-12-05 | End: 2024-12-12

## 2024-12-05 RX ORDER — ACYCLOVIR 800 MG/1
800 TABLET ORAL
Qty: 35 TABLET | Refills: 0 | Status: SHIPPED | OUTPATIENT
Start: 2024-12-05 | End: 2024-12-12

## 2024-12-05 NOTE — PATIENT INSTRUCTIONS
Acyclovir 5 times daily for 7 days  Bactrim twice daily for 7 days unless you hear from us based on wound culture    Watch closely for worsening symptoms of infection including fever, chills, redness, swelling, pain, purulent discharge and follow-up right away if develops.     Make appointment with primary care within 4-5 days for a recheck

## 2024-12-05 NOTE — PROGRESS NOTES
Assessment & Plan     Herpetic raul    - acyclovir (ZOVIRAX) 800 MG tablet  Dispense: 35 tablet; Refill: 0    Finger infection    - sulfamethoxazole-trimethoprim (BACTRIM DS) 800-160 MG tablet  Dispense: 14 tablet; Refill: 0  - Swab Aerobic Bacterial Culture Routine Without Gram Stain     Concern for infected herpetic raul. Prescriptions sent to pharmacy for acyclovir 800 mg five times daily for 7 days and Bactrim twice daily for 7 days. Wound culture in process, will notify if she should change abx. Keep hands clean and dry. Watch closely for worsening symptoms of infection including fever, chills, redness, swelling, pain, purulent discharge and follow-up right away if develops.     Make appointment with primary care within 4-5 days for a recheck    Follow-up with PCP if symptoms persist for 5 days, and sooner if symptoms worsen or new symptoms develop.     Discussed red flag symptoms which warrant immediate visit in emergency room    All questions were answered and patient verbalized understanding. AVS reviewed with patient.     30 minutes spent during visit, chart review, and charting on day of encounter.      Carito Miles, DNP, APRN, CNP 12/5/2024 4:22 PM  [facility]    Rosaura Shelby is a 39 year old female who presents to clinic today for the following health issues:  Chief Complaint   Patient presents with    Urgent Care    Derm Problem     Per patient states for a couple of days now she has been having blisters on fingers, skin it turning hard, painful with some swelling          12/5/2024     3:30 PM   Additional Questions   Roomed by MICHAEL Liz   Accompanied by Self     Patient presents for evaluation of blisters on her fingers. Symptoms started last weekend on right middle finger and have spread to other fingers yesterday and have been worsening. Associated symptoms: redness, pain, tingling, swelling. Has been taking ibuprofen and applying antibiotic ointment which  helps temporarily.     She does have a history of cold sores though none recently and works as a . She did recently do her fingernails.     She has a history of diabetes.   Problem list, Medication list, Allergies, and Medical history reviewed in EPIC.    ROS:  Review of systems negative except for noted above        Objective    BP (!) 141/93   Pulse 90   Temp 97.4  F (36.3  C) (Tympanic)   Resp 18   Wt 88 kg (194 lb)   LMP 06/01/2021   SpO2 95%   BMI 38.09 kg/m    Physical Exam  Constitutional:       General: She is not in acute distress.     Appearance: She is not toxic-appearing or diaphoretic.   Cardiovascular:      Pulses: Normal pulses.   Skin:     General: Skin is warm.      Findings: Erythema present.      Comments: At distal fingertips blistering skin without drainage with erythema. Right middle finger edematous around fingernail with erythema and warmth without abscess or draingage   Neurological:      Mental Status: She is alert.      Sensory: No sensory deficit.                  Verbal consent obtained from patient to take photo for medical electronic health record

## 2024-12-07 LAB
BACTERIA WND CULT: ABNORMAL

## 2025-01-01 LAB
BACTERIA WND CULT: ABNORMAL

## 2025-01-02 ENCOUNTER — TELEPHONE (OUTPATIENT)
Dept: URGENT CARE | Facility: URGENT CARE | Age: 40
End: 2025-01-02
Payer: COMMERCIAL

## 2025-01-02 DIAGNOSIS — L08.9 FINGER INFECTION: Primary | ICD-10-CM

## 2025-01-02 RX ORDER — CEPHALEXIN 500 MG/1
500 CAPSULE ORAL 4 TIMES DAILY
Qty: 28 CAPSULE | Refills: 0 | Status: SHIPPED | OUTPATIENT
Start: 2025-01-02 | End: 2025-01-09

## 2025-01-02 NOTE — TELEPHONE ENCOUNTER
Patient notified of wound culture from 12/5/24 and states fingers are still scaling and cracking. Denies fever. She finished acyclovir and Bactrim. Will add Keflex four times daily for 7 days. Follow up with PCP for in person recheck this week, sooner if worsening. Questions answered.

## 2025-01-08 ENCOUNTER — VIRTUAL VISIT (OUTPATIENT)
Dept: FAMILY MEDICINE | Facility: CLINIC | Age: 40
End: 2025-01-08
Payer: COMMERCIAL

## 2025-01-08 DIAGNOSIS — B00.89 HERPETIC WHITLOW: ICD-10-CM

## 2025-01-08 DIAGNOSIS — E66.01 CLASS 3 SEVERE OBESITY DUE TO EXCESS CALORIES WITHOUT SERIOUS COMORBIDITY WITH BODY MASS INDEX (BMI) OF 40.0 TO 44.9 IN ADULT (H): Primary | ICD-10-CM

## 2025-01-08 DIAGNOSIS — E66.813 CLASS 3 SEVERE OBESITY DUE TO EXCESS CALORIES WITHOUT SERIOUS COMORBIDITY WITH BODY MASS INDEX (BMI) OF 40.0 TO 44.9 IN ADULT (H): Primary | ICD-10-CM

## 2025-01-08 DIAGNOSIS — R63.2 BINGE EATING: ICD-10-CM

## 2025-01-08 DIAGNOSIS — Z09 FOLLOW-UP EXAM: ICD-10-CM

## 2025-01-08 DIAGNOSIS — E66.01 MORBID OBESITY (H): ICD-10-CM

## 2025-01-08 PROCEDURE — 98006 SYNCH AUDIO-VIDEO EST MOD 30: CPT | Performed by: NURSE PRACTITIONER

## 2025-01-08 RX ORDER — LISDEXAMFETAMINE DIMESYLATE 30 MG/1
30 CAPSULE ORAL DAILY
Qty: 30 CAPSULE | Refills: 0 | Status: SHIPPED | OUTPATIENT
Start: 2025-03-09 | End: 2025-04-08

## 2025-01-08 RX ORDER — ACYCLOVIR 800 MG/1
800 TABLET ORAL
Qty: 35 TABLET | Refills: 1 | Status: SHIPPED | OUTPATIENT
Start: 2025-01-08

## 2025-01-08 RX ORDER — LISDEXAMFETAMINE DIMESYLATE 30 MG/1
30 CAPSULE ORAL DAILY
Qty: 30 CAPSULE | Refills: 0 | Status: SHIPPED | OUTPATIENT
Start: 2025-02-07 | End: 2025-03-09

## 2025-01-08 RX ORDER — LISDEXAMFETAMINE DIMESYLATE 30 MG/1
30 CAPSULE ORAL DAILY
Qty: 30 CAPSULE | Refills: 0 | Status: SHIPPED | OUTPATIENT
Start: 2025-01-08 | End: 2025-02-07

## 2025-01-08 NOTE — PROGRESS NOTES
"Heron is a 39 year old who is being evaluated via a billable video visit.    How would you like to obtain your AVS? MyChart  If the video visit is dropped, the invitation should be resent by: Text to cell phone: [phone]  Will anyone else be joining your video visit? No      Assessment & Plan     Class 3 severe obesity due to excess calories without serious comorbidity with body mass index (BMI) of 40.0 to 44.9 in adult (H)    - lisdexamfetamine (VYVANSE) 30 MG capsule; Take 1 capsule (30 mg) by mouth daily.  - lisdexamfetamine (VYVANSE) 30 MG capsule; Take 1 capsule (30 mg) by mouth daily.  - lisdexamfetamine (VYVANSE) 30 MG capsule; Take 1 capsule (30 mg) by mouth daily.    Morbid obesity (H)    - lisdexamfetamine (VYVANSE) 30 MG capsule; Take 1 capsule (30 mg) by mouth daily.  - lisdexamfetamine (VYVANSE) 30 MG capsule; Take 1 capsule (30 mg) by mouth daily.  - lisdexamfetamine (VYVANSE) 30 MG capsule; Take 1 capsule (30 mg) by mouth daily.    Binge eating    - lisdexamfetamine (VYVANSE) 30 MG capsule; Take 1 capsule (30 mg) by mouth daily.  - lisdexamfetamine (VYVANSE) 30 MG capsule; Take 1 capsule (30 mg) by mouth daily.  - lisdexamfetamine (VYVANSE) 30 MG capsule; Take 1 capsule (30 mg) by mouth daily.    Herpetic raul    - acyclovir (ZOVIRAX) 800 MG tablet; Take 1 tablet (800 mg) by mouth 5 times daily.        MED REC REQUIRED  Post Medication Reconciliation Status:  Discharge medications reconciled and changed, see notes/orders  BMI  Estimated body mass index is 38.09 kg/m  as calculated from the following:    Height as of 8/27/24: 1.52 m (4' 11.84\").    Weight as of 12/5/24: 88 kg (194 lb).   Weight management plan: Discussed healthy diet and exercise guidelines has tried phentermine in the past and had success but lost effectiveness. Did not tolerate topamax. Likes energy boost from phentermine, mom on wegovy and it has been working well for her. Discussed vyvanse, she would like to try this. CSA " current. Advised to let us know how medication goes.       See Patient Instructions    Subjective   Heron is a 39 year old, presenting for the following health issues:    Was seen at urgent care for finger infection. Treated with antiviral, felt that helped. Became infected, culture came back and she was started on antibiotics. Feels it is improving. Discussed can reoccur, will give her refill of antiviral medication to start right away if needed. She is in agreement. Tips given on AVS. Follow up if needed. Weight management per above.   RECHECK and Hospital F/U      1/8/2025     7:06 AM   Additional Questions   Roomed by Patient completed echeck in via [employer]     History of Present Illness       Reason for visit:  Phentermine refill and follow up for infection on fingers   She is taking medications regularly.       ED/UC Followup:    Facility:  [facility]  Date of visit: 12/5/24  Reason for visit: finger infection  Current Status: discharged        Review of Systems  Constitutional, HEENT, cardiovascular, pulmonary, GI, , musculoskeletal, neuro, skin, endocrine and psych systems are negative, except as otherwise noted.      Objective           Vitals:  No vitals were obtained today due to virtual visit.    Physical Exam   GENERAL: alert and no distress  EYES: Eyes grossly normal to inspection.  No discharge or erythema, or obvious scleral/conjunctival abnormalities.  RESP: No audible wheeze, cough, or visible cyanosis.    SKIN: POSITIVE erythema of finger tip  NEURO: Cranial nerves grossly intact.  Mentation and speech appropriate for age.  PSYCH: Appropriate affect, tone, and pace of words    See orders      The longitudinal plan of care for the diagnosis(es)/condition(s) as documented were addressed during this visit. Due to the added complexity in care, I will continue to support Heron in the subsequent management and with ongoing continuity of care.      Video-Visit Details    Type of service:  Video  Visit   Originating Location (pt. Location): Home    Distant Location (provider location):  Off-site  Platform used for Video Visit: Shawn  Signed Electronically by: JORDI MENDEZ

## 2025-01-16 ENCOUNTER — MYC MEDICAL ADVICE (OUTPATIENT)
Dept: FAMILY MEDICINE | Facility: CLINIC | Age: 40
End: 2025-01-16
Payer: COMMERCIAL

## 2025-01-16 DIAGNOSIS — E66.813 CLASS 3 SEVERE OBESITY DUE TO EXCESS CALORIES WITHOUT SERIOUS COMORBIDITY WITH BODY MASS INDEX (BMI) OF 40.0 TO 44.9 IN ADULT (H): Primary | ICD-10-CM

## 2025-01-16 DIAGNOSIS — R63.2 BINGE EATING: ICD-10-CM

## 2025-01-16 DIAGNOSIS — E66.01 CLASS 3 SEVERE OBESITY DUE TO EXCESS CALORIES WITHOUT SERIOUS COMORBIDITY WITH BODY MASS INDEX (BMI) OF 40.0 TO 44.9 IN ADULT (H): Primary | ICD-10-CM

## 2025-01-16 RX ORDER — PHENTERMINE HYDROCHLORIDE 37.5 MG/1
37.5 CAPSULE ORAL EVERY MORNING
Qty: 30 CAPSULE | Refills: 3 | Status: SHIPPED | OUTPATIENT
Start: 2025-01-16

## 2025-04-06 ENCOUNTER — OFFICE VISIT (OUTPATIENT)
Dept: URGENT CARE | Facility: URGENT CARE | Age: 40
End: 2025-04-06
Payer: COMMERCIAL

## 2025-04-06 VITALS
DIASTOLIC BLOOD PRESSURE: 89 MMHG | TEMPERATURE: 97.6 F | HEART RATE: 88 BPM | OXYGEN SATURATION: 99 % | SYSTOLIC BLOOD PRESSURE: 136 MMHG | BODY MASS INDEX: 39.19 KG/M2 | WEIGHT: 199.6 LBS

## 2025-04-06 DIAGNOSIS — L23.2 ALLERGIC CONTACT DERMATITIS DUE TO COSMETICS: Primary | ICD-10-CM

## 2025-04-06 PROCEDURE — 3079F DIAST BP 80-89 MM HG: CPT | Performed by: EMERGENCY MEDICINE

## 2025-04-06 PROCEDURE — 99213 OFFICE O/P EST LOW 20 MIN: CPT | Performed by: EMERGENCY MEDICINE

## 2025-04-06 PROCEDURE — 3075F SYST BP GE 130 - 139MM HG: CPT | Performed by: EMERGENCY MEDICINE

## 2025-04-06 PROCEDURE — 1125F AMNT PAIN NOTED PAIN PRSNT: CPT | Performed by: EMERGENCY MEDICINE

## 2025-04-06 RX ORDER — CETIRIZINE HYDROCHLORIDE 10 MG/1
10 TABLET ORAL DAILY
Qty: 14 TABLET | Refills: 0 | Status: SHIPPED | OUTPATIENT
Start: 2025-04-06

## 2025-04-06 RX ORDER — DEXAMETHASONE SODIUM PHOSPHATE 4 MG/ML
6 VIAL (ML) INJECTION ONCE
Status: COMPLETED | OUTPATIENT
Start: 2025-04-06 | End: 2025-04-06

## 2025-04-06 RX ORDER — TRIAMCINOLONE ACETONIDE 1 MG/G
OINTMENT TOPICAL 2 TIMES DAILY
Qty: 30 G | Refills: 0 | Status: SHIPPED | OUTPATIENT
Start: 2025-04-06 | End: 2025-04-13

## 2025-04-06 RX ADMIN — Medication 6 MG: at 10:42

## 2025-04-06 ASSESSMENT — PAIN SCALES - GENERAL: PAINLEVEL_OUTOF10: MILD PAIN (2)

## 2025-04-06 NOTE — PROGRESS NOTES
Assessment & Plan     Diagnosis:    ICD-10-CM    1. Allergic contact dermatitis due to cosmetics  L23.2 dexAMETHasone (DECADRON) injectable solution used ORALLY 6 mg     triamcinolone (KENALOG) 0.1 % external ointment     cetirizine (ZYRTEC) 10 MG tablet        Medical Decision Making:  Heron Gee is a 39 year old female who presents for evaluation of a rash.  This appears consistent with a dermatitis.  She placed a new nail polish/gel on her fingers and woke up this morning with all her fingers quite irritated and red.  This appears to be a contact dermatitis, discussed she should have this washed off.  Given this affects all the fingers I did give her a dose of Decadron here, recommend antihistamines and triamcinolone ointment for home.  Doubt herpetic raul given chronicity of symptoms related to use of nail polish/gel.  No signs of superinfection or indication for antibiotics at this juncture.  Outpatient regimen as noted above.  See primary this week for recheck or dermatology if worse.  Questions answered.      Nick Dominguez PA-C  [facility]    Subjective     HPI    Heron Gee is a 39 year old female who presents to clinic today for the following health issues:  Chief Complaint   Patient presents with    Finger     Patient seen today for blisters around her nail bed. She states that she painted her nails and now she is having some tingling and burning.   She notes this has happened in the past, but she was using a new nail polish this morning.  No fever. She is noticing some small blisters but they have not opened or drained.      Review of Systems    See HPI    Objective      Vitals: /89 (BP Location: Right arm, Patient Position: Sitting, Cuff Size: Adult Large)   Pulse 88   Temp 97.6  F (36.4  C) (Tympanic)   Wt 90.5 kg (199 lb 9.6 oz)   LMP 06/01/2021   SpO2 99%   BMI 39.19 kg/m      Patient Vitals for the past 24 hrs:   BP Temp Temp src Pulse SpO2 Weight    04/06/25 0958 136/89 97.6  F (36.4  C) Tympanic 88 99 % 90.5 kg (199 lb 9.6 oz)       Vital signs reviewed by: Nick Dominguez PA-C    Physical Exam   Constitutional: Patient is alert and cooperative. No acute distress.  Neurological: Alert and oriented x3.   Skin: Erythema surrounding the nails of all her fingers, there is a few areas with small vesicles.  No fluctuance or areas of pointing.  No lymphangitis.  Normal range of motion of all fingers.  Psychiatric:The patient has a normal mood and affect.       Interventions:  Decadron 6mg PO      Nick Dominguez PA-C, April 6, 2025

## 2025-04-06 NOTE — PROGRESS NOTES
Clinic Administered Medication Documentation    Patient was given Decadron. Prior to medication administration, verified patient's identity using patient's name and date of birth.    Asa Benitez MA

## 2025-05-01 ASSESSMENT — ASTHMA QUESTIONNAIRES
ACT_TOTALSCORE: 23
QUESTION_1 LAST FOUR WEEKS HOW MUCH OF THE TIME DID YOUR ASTHMA KEEP YOU FROM GETTING AS MUCH DONE AT WORK, SCHOOL OR AT HOME: NONE OF THE TIME
QUESTION_5 LAST FOUR WEEKS HOW WOULD YOU RATE YOUR ASTHMA CONTROL: WELL CONTROLLED
QUESTION_3 LAST FOUR WEEKS HOW OFTEN DID YOUR ASTHMA SYMPTOMS (WHEEZING, COUGHING, SHORTNESS OF BREATH, CHEST TIGHTNESS OR PAIN) WAKE YOU UP AT NIGHT OR EARLIER THAN USUAL IN THE MORNING: ONCE OR TWICE
QUESTION_4 LAST FOUR WEEKS HOW OFTEN HAVE YOU USED YOUR RESCUE INHALER OR NEBULIZER MEDICATION (SUCH AS ALBUTEROL): NOT AT ALL
QUESTION_2 LAST FOUR WEEKS HOW OFTEN HAVE YOU HAD SHORTNESS OF BREATH: NOT AT ALL

## 2025-05-06 ENCOUNTER — VIRTUAL VISIT (OUTPATIENT)
Dept: FAMILY MEDICINE | Facility: CLINIC | Age: 40
End: 2025-05-06
Payer: COMMERCIAL

## 2025-05-06 DIAGNOSIS — E66.01 MORBID OBESITY (H): Primary | ICD-10-CM

## 2025-05-06 DIAGNOSIS — R63.2 BINGE EATING: ICD-10-CM

## 2025-05-06 DIAGNOSIS — E66.813 CLASS 3 SEVERE OBESITY DUE TO EXCESS CALORIES WITHOUT SERIOUS COMORBIDITY WITH BODY MASS INDEX (BMI) OF 40.0 TO 44.9 IN ADULT (H): ICD-10-CM

## 2025-05-06 PROCEDURE — 98005 SYNCH AUDIO-VIDEO EST LOW 20: CPT | Performed by: NURSE PRACTITIONER

## 2025-05-06 RX ORDER — PHENTERMINE HYDROCHLORIDE 37.5 MG/1
37.5 CAPSULE ORAL EVERY MORNING
Qty: 30 CAPSULE | Refills: 3 | Status: SHIPPED | OUTPATIENT
Start: 2025-05-06

## 2025-05-06 NOTE — PROGRESS NOTES
Heron is a 40 year old who is being evaluated via a billable video visit.    How would you like to obtain your AVS? MyChart  If the video visit is dropped, the invitation should be resent by: Text to cell phone: [phone]  Will anyone else be joining your video visit? No      Assessment & Plan     Class 3 severe obesity due to excess calories without serious comorbidity with body mass index (BMI) of 40.0 to 44.9 in adult (H)  Unsure if phentermine is helping much with weight loss, has plateaued. Does help energy.  Feels shift work is more difficult than weight. Discussed changing medication vs taking break from phentermine and restart. She would like to try this.  CSA UTD. MH good. Tips given on AVS, discussed diet, activity, calories, anti inflammation diet.   - phentermine (ADIPEX-P) 37.5 MG capsule; Take 1 capsule (37.5 mg) by mouth every morning.    Binge eating  Unsure if phentermine is helping much with weight loss, has plateaued. Does help energy.  Feels shift work is more difficult than weight. Discussed changing medication vs taking break from phentermine and restart. She would like to try this.  CSA UTD. MH good. Tips given on AVS, discussed diet, activity, calories, anti inflammation diet.   - phentermine (ADIPEX-P) 37.5 MG capsule; Take 1 capsule (37.5 mg) by mouth every morning.    Morbid obesity (H)  Unsure if phentermine is helping much with weight loss, has plateaued. Does help energy.  Feels shift work is more difficult than weight. Discussed changing medication vs taking break from phentermine and restart. She would like to try this.  CSA UTD. MH good. Tips given on AVS, discussed diet, activity, calories, anti inflammation diet.       Follow-up  Return in about 4 months (around 9/6/2025), or if symptoms worsen or fail to improve.    Subjective   Heron is a 40 year old, presenting for the following health issues:  Medication Refill        5/6/2025     6:42 AM   Additional Questions   Roomed by  Patient completed questions via [employer]     Medication Refill    History of Present Illness       Reason for visit:  Prescription refill and follow up   She is taking medications regularly.            Review of Systems  Constitutional, HEENT, cardiovascular, pulmonary, GI, , musculoskeletal, neuro, skin, endocrine and psych systems are negative, except as otherwise noted.      Objective           Vitals:  No vitals were obtained today due to virtual visit.    Physical Exam   GENERAL: alert and no distress  EYES: Eyes grossly normal to inspection.  No discharge or erythema, or obvious scleral/conjunctival abnormalities.  RESP: No audible wheeze, cough, or visible cyanosis.    SKIN: Visible skin clear. No significant rash, abnormal pigmentation or lesions.  NEURO: Cranial nerves grossly intact.  Mentation and speech appropriate for age.  PSYCH: Appropriate affect, tone, and pace of words    See orders      Video-Visit Details    Type of service:  Video Visit   Originating Location (pt. Location): Home    Distant Location (provider location):  On-site  Platform used for Video Visit: Shawn  Signed Electronically by: JORDI MENDEZ

## 2025-08-18 ENCOUNTER — PATIENT OUTREACH (OUTPATIENT)
Dept: CARE COORDINATION | Facility: CLINIC | Age: 40
End: 2025-08-18
Payer: COMMERCIAL

## (undated) DEVICE — SWAB RAYON 8" 1 TIP LG STERILE 34-7022-50

## (undated) DEVICE — PREP CHLORAPREP 26ML TINTED ORANGE  260815

## (undated) DEVICE — KOH COLPOTOMIZER OCCLUDER  CPO-6

## (undated) DEVICE — DRAPE LEGGINGS 8421

## (undated) DEVICE — ESU GROUND PAD ADULT W/CORD E7507

## (undated) DEVICE — JELLY LUBRICATING SURGILUBE 2OZ TUBE

## (undated) DEVICE — SU VICRYL 0 CT-2 27" UND J270H

## (undated) DEVICE — KIT PATIENT POSITIONING PIGAZZI LATEX FREE 40580

## (undated) DEVICE — PREP TECHNI-CARE CHLOROXYLENOL 3% 4OZ BOTTLE C222-4ZWO

## (undated) DEVICE — PACK MINOR SBA15MIFSE

## (undated) DEVICE — SYR 50ML LL W/O NDL 309653

## (undated) DEVICE — GLOVE PROTEXIS BLUE W/NEU-THERA 7.0  2D73EB70

## (undated) DEVICE — SU VICRYL 3-0 SH 27" UND J416H

## (undated) DEVICE — NDL INSUFFLATION 13GA 120MM C2201

## (undated) DEVICE — DRAPE SHEET HALF 40X60" 9358

## (undated) DEVICE — BLADE KNIFE BEAVER CERVICAL  379100

## (undated) DEVICE — GLOVE PROTEXIS W/NEU-THERA 6.5  2D73TE65

## (undated) DEVICE — SOL NACL 0.9% IRRIG 500ML BOTTLE 2F7123

## (undated) DEVICE — SURGICEL HEMOSTAT 2X3" 1953

## (undated) DEVICE — BLADE CLIPPER SGL USE 9680

## (undated) DEVICE — SUCTION TIP YANKAUER W/O VENT K86

## (undated) DEVICE — ENDO TROCAR FIRST ENTRY KII FIOS ADV FIX 05X100MM CFF03

## (undated) DEVICE — DRSG PRIMAPORE 02X3" 7133

## (undated) DEVICE — SUCTION IRR STRYKERFLOW II W/TIP 250-070-520

## (undated) DEVICE — DRSG TELFA 3X8" 1238

## (undated) DEVICE — APPLICATOR RAYON TIP 8" SWAB

## (undated) DEVICE — PROTECTOR ARM ONE-STEP TRENDELENBURG 40418

## (undated) DEVICE — SOL ADH LIQUID BENZOIN SWAB 0.6ML C1544

## (undated) DEVICE — SU MONOCRYL 4-0 PS-2 27" UND Y426H

## (undated) DEVICE — ESU LIGASURE LAPAROSCOPIC BLUNT TIP SEALER 5MMX37CM LF1837

## (undated) DEVICE — SOL NACL 0.9% INJ 1000ML BAG 2B1324X

## (undated) DEVICE — Device

## (undated) DEVICE — PREP SKIN SCRUB TRAY 4461A

## (undated) DEVICE — RETR ELEV / UTERINE MANIPULATOR V-CARE MED CUP 60-6085-201A

## (undated) DEVICE — DRAPE LEGGINGS CLEAR 8430

## (undated) DEVICE — DRAPE MAYO STAND 23X54 8337

## (undated) DEVICE — APPLICATOR COTTON TIP 6"X2 STERILE LF 6012

## (undated) DEVICE — LINEN TOWEL PACK X5 5464

## (undated) DEVICE — SU WND CLOSURE VLOC 180 ABS 0 9" GS-21 VLOCL0346

## (undated) DEVICE — SOL WATER IRRIG 1000ML BOTTLE 07139-09

## (undated) DEVICE — SUCTION MANIFOLD NEPTUNE 2 SYS 4 PORT 0702-020-000

## (undated) DEVICE — ENDO TROCAR SLEEVE KII ADV FIXATION 05X100MM CFS02

## (undated) RX ORDER — OXYCODONE HYDROCHLORIDE 5 MG/1
TABLET ORAL
Status: DISPENSED
Start: 2021-06-10

## (undated) RX ORDER — PROPOFOL 10 MG/ML
INJECTION, EMULSION INTRAVENOUS
Status: DISPENSED
Start: 2021-04-15

## (undated) RX ORDER — KETOROLAC TROMETHAMINE 30 MG/ML
INJECTION, SOLUTION INTRAMUSCULAR; INTRAVENOUS
Status: DISPENSED
Start: 2021-06-10

## (undated) RX ORDER — FENTANYL CITRATE 50 UG/ML
INJECTION, SOLUTION INTRAMUSCULAR; INTRAVENOUS
Status: DISPENSED
Start: 2021-06-10

## (undated) RX ORDER — PROPOFOL 10 MG/ML
INJECTION, EMULSION INTRAVENOUS
Status: DISPENSED
Start: 2021-06-10

## (undated) RX ORDER — INDOCYANINE GREEN AND WATER 25 MG
KIT INJECTION
Status: DISPENSED
Start: 2021-06-10

## (undated) RX ORDER — LIDOCAINE HYDROCHLORIDE AND EPINEPHRINE 10; 10 MG/ML; UG/ML
INJECTION, SOLUTION INFILTRATION; PERINEURAL
Status: DISPENSED
Start: 2021-04-15

## (undated) RX ORDER — FENTANYL CITRATE 50 UG/ML
INJECTION, SOLUTION INTRAMUSCULAR; INTRAVENOUS
Status: DISPENSED
Start: 2021-04-15

## (undated) RX ORDER — BUPIVACAINE HYDROCHLORIDE 2.5 MG/ML
INJECTION, SOLUTION EPIDURAL; INFILTRATION; INTRACAUDAL
Status: DISPENSED
Start: 2021-06-10

## (undated) RX ORDER — CEFAZOLIN SODIUM 2 G/50ML
SOLUTION INTRAVENOUS
Status: DISPENSED
Start: 2021-06-10

## (undated) RX ORDER — IODINE AND POTASSIUM IODIDE 50; 100 MG/ML; MG/ML
LIQUID ORAL
Status: DISPENSED
Start: 2021-04-15

## (undated) RX ORDER — PROPOFOL 10 MG/ML
INJECTION, EMULSION INTRAVENOUS
Status: DISPENSED
Start: 2021-10-04

## (undated) RX ORDER — ONDANSETRON 2 MG/ML
INJECTION INTRAMUSCULAR; INTRAVENOUS
Status: DISPENSED
Start: 2021-04-15

## (undated) RX ORDER — BUPIVACAINE HYDROCHLORIDE 2.5 MG/ML
INJECTION, SOLUTION EPIDURAL; INFILTRATION; INTRACAUDAL
Status: DISPENSED
Start: 2021-04-15

## (undated) RX ORDER — FENTANYL CITRATE-0.9 % NACL/PF 10 MCG/ML
PLASTIC BAG, INJECTION (ML) INTRAVENOUS
Status: DISPENSED
Start: 2021-06-10

## (undated) RX ORDER — HEPARIN SODIUM 10000 [USP'U]/ML
INJECTION, SOLUTION INTRAVENOUS; SUBCUTANEOUS
Status: DISPENSED
Start: 2021-06-10

## (undated) RX ORDER — KETOROLAC TROMETHAMINE 30 MG/ML
INJECTION, SOLUTION INTRAMUSCULAR; INTRAVENOUS
Status: DISPENSED
Start: 2021-04-15

## (undated) RX ORDER — ACETAMINOPHEN 325 MG/1
TABLET ORAL
Status: DISPENSED
Start: 2021-04-15

## (undated) RX ORDER — ALBUTEROL SULFATE 90 UG/1
AEROSOL, METERED RESPIRATORY (INHALATION)
Status: DISPENSED
Start: 2021-06-10

## (undated) RX ORDER — LIDOCAINE HYDROCHLORIDE 20 MG/ML
INJECTION, SOLUTION EPIDURAL; INFILTRATION; INTRACAUDAL; PERINEURAL
Status: DISPENSED
Start: 2021-06-10

## (undated) RX ORDER — ONDANSETRON 2 MG/ML
INJECTION INTRAMUSCULAR; INTRAVENOUS
Status: DISPENSED
Start: 2021-06-10

## (undated) RX ORDER — DEXAMETHASONE SODIUM PHOSPHATE 4 MG/ML
INJECTION, SOLUTION INTRA-ARTICULAR; INTRALESIONAL; INTRAMUSCULAR; INTRAVENOUS; SOFT TISSUE
Status: DISPENSED
Start: 2021-06-10

## (undated) RX ORDER — LIDOCAINE HYDROCHLORIDE 10 MG/ML
INJECTION, SOLUTION EPIDURAL; INFILTRATION; INTRACAUDAL; PERINEURAL
Status: DISPENSED
Start: 2021-10-04

## (undated) RX ORDER — DEXAMETHASONE SODIUM PHOSPHATE 4 MG/ML
INJECTION, SOLUTION INTRA-ARTICULAR; INTRALESIONAL; INTRAMUSCULAR; INTRAVENOUS; SOFT TISSUE
Status: DISPENSED
Start: 2021-04-15

## (undated) RX ORDER — FERRIC SUBSULFATE 0.21 G/G
LIQUID TOPICAL
Status: DISPENSED
Start: 2021-04-15